# Patient Record
Sex: FEMALE | Race: WHITE | NOT HISPANIC OR LATINO
[De-identification: names, ages, dates, MRNs, and addresses within clinical notes are randomized per-mention and may not be internally consistent; named-entity substitution may affect disease eponyms.]

---

## 2017-04-18 ENCOUNTER — APPOINTMENT (OUTPATIENT)
Dept: INTERNAL MEDICINE | Facility: CLINIC | Age: 82
End: 2017-04-18

## 2017-04-18 VITALS
SYSTOLIC BLOOD PRESSURE: 100 MMHG | DIASTOLIC BLOOD PRESSURE: 66 MMHG | HEART RATE: 66 BPM | BODY MASS INDEX: 26.1 KG/M2 | TEMPERATURE: 97.5 F | OXYGEN SATURATION: 100 % | WEIGHT: 116.03 LBS | HEIGHT: 56 IN

## 2017-04-18 RX ORDER — CLINDAMYCIN HYDROCHLORIDE 300 MG/1
300 CAPSULE ORAL
Qty: 10 | Refills: 0 | Status: COMPLETED | COMMUNITY
Start: 2016-10-24

## 2017-06-28 ENCOUNTER — OTHER (OUTPATIENT)
Age: 82
End: 2017-06-28

## 2017-10-04 ENCOUNTER — FORM ENCOUNTER (OUTPATIENT)
Age: 82
End: 2017-10-04

## 2017-10-05 ENCOUNTER — APPOINTMENT (OUTPATIENT)
Dept: CT IMAGING | Facility: CLINIC | Age: 82
End: 2017-10-05

## 2017-10-05 ENCOUNTER — OUTPATIENT (OUTPATIENT)
Dept: OUTPATIENT SERVICES | Facility: HOSPITAL | Age: 82
LOS: 1 days | End: 2017-10-05
Payer: MEDICARE

## 2017-10-05 DIAGNOSIS — Z98.51 TUBAL LIGATION STATUS: Chronic | ICD-10-CM

## 2017-10-05 DIAGNOSIS — Z00.8 ENCOUNTER FOR OTHER GENERAL EXAMINATION: ICD-10-CM

## 2017-10-05 DIAGNOSIS — Z96.60 PRESENCE OF UNSPECIFIED ORTHOPEDIC JOINT IMPLANT: Chronic | ICD-10-CM

## 2017-10-05 DIAGNOSIS — Z90.710 ACQUIRED ABSENCE OF BOTH CERVIX AND UTERUS: Chronic | ICD-10-CM

## 2017-10-05 PROCEDURE — 71250 CT THORAX DX C-: CPT

## 2017-10-05 PROCEDURE — 71250 CT THORAX DX C-: CPT | Mod: 26

## 2017-10-17 ENCOUNTER — APPOINTMENT (OUTPATIENT)
Dept: INTERNAL MEDICINE | Facility: CLINIC | Age: 82
End: 2017-10-17
Payer: MEDICARE

## 2017-10-17 VITALS
DIASTOLIC BLOOD PRESSURE: 72 MMHG | TEMPERATURE: 97.7 F | HEIGHT: 56 IN | HEART RATE: 60 BPM | BODY MASS INDEX: 26.32 KG/M2 | SYSTOLIC BLOOD PRESSURE: 110 MMHG | OXYGEN SATURATION: 97 % | WEIGHT: 117 LBS

## 2017-10-17 PROCEDURE — 94010 BREATHING CAPACITY TEST: CPT | Mod: 59

## 2017-10-17 PROCEDURE — 94726 PLETHYSMOGRAPHY LUNG VOLUMES: CPT

## 2017-10-17 PROCEDURE — 99214 OFFICE O/P EST MOD 30 MIN: CPT | Mod: 25

## 2017-10-17 PROCEDURE — 94729 DIFFUSING CAPACITY: CPT

## 2017-10-17 PROCEDURE — 94620 PULMONARY STRESS TESTING SIMPLE: CPT

## 2018-03-28 ENCOUNTER — APPOINTMENT (OUTPATIENT)
Dept: NEUROLOGY | Facility: CLINIC | Age: 83
End: 2018-03-28
Payer: MEDICARE

## 2018-03-28 VITALS
SYSTOLIC BLOOD PRESSURE: 122 MMHG | BODY MASS INDEX: 24.35 KG/M2 | HEART RATE: 62 BPM | WEIGHT: 116 LBS | DIASTOLIC BLOOD PRESSURE: 60 MMHG | HEIGHT: 58 IN

## 2018-03-28 PROCEDURE — 99204 OFFICE O/P NEW MOD 45 MIN: CPT

## 2018-03-29 ENCOUNTER — FORM ENCOUNTER (OUTPATIENT)
Age: 83
End: 2018-03-29

## 2018-03-30 ENCOUNTER — APPOINTMENT (OUTPATIENT)
Dept: MRI IMAGING | Facility: CLINIC | Age: 83
End: 2018-03-30
Payer: MEDICARE

## 2018-03-30 ENCOUNTER — OUTPATIENT (OUTPATIENT)
Dept: OUTPATIENT SERVICES | Facility: HOSPITAL | Age: 83
LOS: 1 days | End: 2018-03-30
Payer: MEDICARE

## 2018-03-30 DIAGNOSIS — Z90.710 ACQUIRED ABSENCE OF BOTH CERVIX AND UTERUS: Chronic | ICD-10-CM

## 2018-03-30 DIAGNOSIS — Z00.8 ENCOUNTER FOR OTHER GENERAL EXAMINATION: ICD-10-CM

## 2018-03-30 DIAGNOSIS — Z98.51 TUBAL LIGATION STATUS: Chronic | ICD-10-CM

## 2018-03-30 DIAGNOSIS — Z96.60 PRESENCE OF UNSPECIFIED ORTHOPEDIC JOINT IMPLANT: Chronic | ICD-10-CM

## 2018-03-30 PROCEDURE — 70551 MRI BRAIN STEM W/O DYE: CPT

## 2018-03-30 PROCEDURE — 70551 MRI BRAIN STEM W/O DYE: CPT | Mod: 26

## 2018-04-03 ENCOUNTER — APPOINTMENT (OUTPATIENT)
Dept: INTERNAL MEDICINE | Facility: CLINIC | Age: 83
End: 2018-04-03
Payer: MEDICARE

## 2018-04-03 VITALS
DIASTOLIC BLOOD PRESSURE: 70 MMHG | OXYGEN SATURATION: 98 % | TEMPERATURE: 97.6 F | HEART RATE: 63 BPM | HEIGHT: 56.5 IN | WEIGHT: 118 LBS | BODY MASS INDEX: 25.81 KG/M2 | SYSTOLIC BLOOD PRESSURE: 130 MMHG

## 2018-04-03 PROCEDURE — 94010 BREATHING CAPACITY TEST: CPT | Mod: 59

## 2018-04-03 PROCEDURE — 94729 DIFFUSING CAPACITY: CPT

## 2018-04-03 PROCEDURE — 94726 PLETHYSMOGRAPHY LUNG VOLUMES: CPT

## 2018-04-03 PROCEDURE — 94618 PULMONARY STRESS TESTING: CPT

## 2018-04-03 PROCEDURE — 99214 OFFICE O/P EST MOD 30 MIN: CPT | Mod: 25

## 2018-04-27 ENCOUNTER — RX RENEWAL (OUTPATIENT)
Age: 83
End: 2018-04-27

## 2018-05-15 ENCOUNTER — APPOINTMENT (OUTPATIENT)
Dept: NEUROLOGY | Facility: CLINIC | Age: 83
End: 2018-05-15
Payer: MEDICARE

## 2018-05-15 VITALS
WEIGHT: 118 LBS | DIASTOLIC BLOOD PRESSURE: 64 MMHG | HEIGHT: 56.5 IN | HEART RATE: 56 BPM | SYSTOLIC BLOOD PRESSURE: 122 MMHG | BODY MASS INDEX: 25.81 KG/M2

## 2018-05-15 PROCEDURE — 96120: CPT | Mod: 59

## 2018-05-15 PROCEDURE — 99214 OFFICE O/P EST MOD 30 MIN: CPT | Mod: 25

## 2018-09-25 ENCOUNTER — APPOINTMENT (OUTPATIENT)
Dept: NEUROLOGY | Facility: CLINIC | Age: 83
End: 2018-09-25
Payer: MEDICARE

## 2018-09-25 VITALS
SYSTOLIC BLOOD PRESSURE: 124 MMHG | WEIGHT: 118 LBS | HEART RATE: 61 BPM | DIASTOLIC BLOOD PRESSURE: 70 MMHG | HEIGHT: 58.8 IN | BODY MASS INDEX: 24.11 KG/M2
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
## 2018-09-25 PROCEDURE — 99214 OFFICE O/P EST MOD 30 MIN: CPT

## 2018-10-02 ENCOUNTER — APPOINTMENT (OUTPATIENT)
Dept: INTERNAL MEDICINE | Facility: CLINIC | Age: 83
End: 2018-10-02
Payer: MEDICARE

## 2018-10-02 VITALS
SYSTOLIC BLOOD PRESSURE: 114 MMHG | OXYGEN SATURATION: 96 % | HEIGHT: 59 IN | TEMPERATURE: 97.2 F | HEART RATE: 65 BPM | DIASTOLIC BLOOD PRESSURE: 60 MMHG | WEIGHT: 119 LBS | BODY MASS INDEX: 23.99 KG/M2

## 2018-10-02 PROCEDURE — 94618 PULMONARY STRESS TESTING: CPT

## 2018-10-02 PROCEDURE — 94726 PLETHYSMOGRAPHY LUNG VOLUMES: CPT

## 2018-10-02 PROCEDURE — 94729 DIFFUSING CAPACITY: CPT

## 2018-10-02 PROCEDURE — 99214 OFFICE O/P EST MOD 30 MIN: CPT | Mod: 25

## 2018-10-02 PROCEDURE — 94010 BREATHING CAPACITY TEST: CPT | Mod: 59

## 2018-11-27 ENCOUNTER — RX RENEWAL (OUTPATIENT)
Age: 83
End: 2018-11-27

## 2019-01-22 ENCOUNTER — APPOINTMENT (OUTPATIENT)
Dept: NEUROLOGY | Facility: CLINIC | Age: 84
End: 2019-01-22
Payer: MEDICARE

## 2019-01-22 VITALS
BODY MASS INDEX: 24.19 KG/M2 | WEIGHT: 120 LBS | HEIGHT: 59 IN | SYSTOLIC BLOOD PRESSURE: 120 MMHG | DIASTOLIC BLOOD PRESSURE: 60 MMHG | HEART RATE: 60 BPM

## 2019-01-22 PROCEDURE — 99214 OFFICE O/P EST MOD 30 MIN: CPT

## 2019-01-22 NOTE — REASON FOR VISIT
[Follow-Up: _____] : a [unfilled] follow-up visit [Spouse] : spouse [FreeTextEntry1] : follow-up to cognitive decline

## 2019-01-22 NOTE — DATA REVIEWED
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

## 2019-01-22 NOTE — PHYSICAL EXAM
[General Appearance - Alert] : alert [General Appearance - In No Acute Distress] : in no acute distress [Oriented To Time, Place, And Person] : oriented to person, place, and time [Impaired Insight] : insight and judgment were intact [Affect] : the affect was normal [Person] : oriented to person [Place] : oriented to place [Time] : oriented to time [Concentration Intact] : normal concentrating ability [Visual Intact] : visual attention was ~T not ~L decreased [Naming Objects] : no difficulty naming common objects [Repeating Phrases] : no difficulty repeating a phrase [Writing A Sentence] : no difficulty writing a sentence [Fluency] : fluency intact [Comprehension] : comprehension intact [Reading] : reading intact [Past History] : adequate knowledge of personal past history [Cranial Nerves Optic (II)] : visual acuity intact bilaterally,  visual fields full to confrontation, pupils equal round and reactive to light [Cranial Nerves Oculomotor (III)] : extraocular motion intact [Cranial Nerves Trigeminal (V)] : facial sensation intact symmetrically [Cranial Nerves Facial (VII)] : face symmetrical [Cranial Nerves Vestibulocochlear (VIII)] : hearing was intact bilaterally [Cranial Nerves Glossopharyngeal (IX)] : tongue and palate midline [Cranial Nerves Accessory (XI - Cranial And Spinal)] : head turning and shoulder shrug symmetric [Cranial Nerves Hypoglossal (XII)] : there was no tongue deviation with protrusion [Motor Tone] : muscle tone was normal in all four extremities [Motor Strength] : muscle strength was normal in all four extremities [No Muscle Atrophy] : normal bulk in all four extremities [Sensation Tactile Decrease] : light touch was intact [Proprioception] : proprioception was intact [Vibration Decrease Leg / Foot Both Ankles] : decreased at both ankles [Vibration Decrease Leg / Foot Toes Both Feet] : decreased at the toes of both feet  [Balance] : balance was intact [2+] : Ankle jerk left 2+ [PERRL With Normal Accommodation] : pupils were equal in size, round, reactive to light, with normal accommodation [Extraocular Movements] : extraocular movements were intact [Full Visual Field] : full visual field [Hearing Threshold Finger Rub Not [place]] : hearing was normal [Neck Cervical Mass (___cm)] : no neck mass was observed [Auscultation Breath Sounds / Voice Sounds] : lungs were clear to auscultation bilaterally [Apical Impulse] : the apical impulse was normal [Heart Sounds] : normal S1 and S2 [Arterial Pulses Carotid] : carotid pulses were normal with no bruits [Edema] : there was no peripheral edema [No Spinal Tenderness] : no spinal tenderness [Abnormal Walk] : normal gait [Involuntary Movements] : no involuntary movements were seen [] : no rash [FreeTextEntry1] : facial hematoma [Romberg's Sign] : Romberg's sign was negtive [Past-pointing] : there was no past-pointing [Tremor] : no tremor present [Plantar Reflex Right Only] : normal on the right [Plantar Reflex Left Only] : normal on the left [FreeTextEntry5] : right periorbital and facial hematoma

## 2019-01-22 NOTE — HISTORY OF PRESENT ILLNESS
[FreeTextEntry1] : Patient is here for a follow up visit today, was last seen on 9/25/18. Patient is on donepezil 10 mg daily, is taking it, has not noted any adverse effects, he has not noted any change in  cognition or functioning status. Denies cramps, bleeding, bad dreams,  has note noted any change or decline, is functional, denies HA, dizziness\par \par \par Pt has not had any falls, LOC. She had followed up with PMD / hematologist, has mildly low platelet count. \par \par

## 2019-01-22 NOTE — DISCUSSION/SUMMARY
[FreeTextEntry1] : 83-year-old RH female with PMH remarkable for ILD, MDS, arthritis/osteoporosis, presents today with mildly evolving cognitive/memory problems since past year.  MoCA score 22/30\par \par Cognitive assessment reveals Global cognitive score 93.2, More than one SD below average in Memory   verbal function, below average in attention, executive function and  information processing speed.\par \par # Mild dementia\par \par - continue donepezil 10 mg daily, adverse effects were discussed with the patient.\par - perform cognitive exercises, state engaged in social activities, follow up with me in 6 months\par \par

## 2019-01-22 NOTE — REVIEW OF SYSTEMS
[As Noted in HPI] : as noted in HPI [Memory Lapses or Loss] : memory loss [Eyesight Problems] : eyesight problems [Negative] : Heme/Lymph

## 2019-04-09 ENCOUNTER — APPOINTMENT (OUTPATIENT)
Dept: INTERNAL MEDICINE | Facility: CLINIC | Age: 84
End: 2019-04-09
Payer: MEDICARE

## 2019-04-09 VITALS
WEIGHT: 120 LBS | TEMPERATURE: 97.6 F | HEART RATE: 58 BPM | DIASTOLIC BLOOD PRESSURE: 70 MMHG | HEIGHT: 59 IN | SYSTOLIC BLOOD PRESSURE: 134 MMHG | BODY MASS INDEX: 24.19 KG/M2 | OXYGEN SATURATION: 93 %

## 2019-04-09 PROCEDURE — 94726 PLETHYSMOGRAPHY LUNG VOLUMES: CPT

## 2019-04-09 PROCEDURE — 99214 OFFICE O/P EST MOD 30 MIN: CPT | Mod: 25

## 2019-04-09 PROCEDURE — 94010 BREATHING CAPACITY TEST: CPT

## 2019-04-09 PROCEDURE — 94618 PULMONARY STRESS TESTING: CPT

## 2019-04-09 PROCEDURE — 94729 DIFFUSING CAPACITY: CPT

## 2019-04-09 NOTE — PHYSICAL EXAM
[General Appearance - Well Developed] : well developed [Well Groomed] : well groomed [General Appearance - Well Nourished] : well nourished [General Appearance - In No Acute Distress] : no acute distress [Jugular Venous Distention Increased] : there was no jugular-venous distention [Heart Sounds] : normal S1 and S2 [Edema] : no peripheral edema present [Heart Rate And Rhythm] : heart rate and rhythm were normal [Exaggerated Use Of Accessory Muscles For Inspiration] : no accessory muscle use [] : no respiratory distress [Respiration, Rhythm And Depth] : normal respiratory rhythm and effort [Bowel Sounds] : normal bowel sounds [Abdomen Soft] : soft [Abnormal Walk] : normal gait [Oriented To Time, Place, And Person] : oriented to person, place, and time [Skin Color & Pigmentation] : normal skin color and pigmentation [Affect] : the affect was normal [Cyanosis, Localized] : no localized cyanosis [Nail Clubbing] : no clubbing of the fingernails [No Oral Cyanosis] : no oral cyanosis [FreeTextEntry1] : R=16; no wheezing; good air entry; chronic b/l mostly basilar rales especially on right

## 2019-04-09 NOTE — ASSESSMENT
[FreeTextEntry1] : ILD/HSP\par Clinically stable\par PFT's remain stable as well.Still desaturates on 6 minute walk test.\par \par Will monitor CT chest - will do f/u in 2019\par PFT's and 6 minute walk with next visit\par Gets flu vaccine annually - received 2018\par No longer smokes\par [place]\par Continue oxygen with exercise and with sleep at 2 LPM via nasal cannula\par To call for any pulmonary issues\par Wishes to RTC in 6 months and as needed

## 2019-04-09 NOTE — HISTORY OF PRESENT ILLNESS
[Stable] : are stable [Difficulty Breathing During Exertion] : stable dyspnea on exertion [Feelings Of Weakness On Exertion] : stable exercise intolerance [Cough] : denies coughing [Wheezing] : denies wheezing [Regional Soft Tissue Swelling Both Lower Extremities] : denies lower extremity edema [Chest Pain Or Discomfort] : denies chest pain [Fever] : denies fever [Oxygen] : the patient uses supplemental oxygen [Oxygen Rate  ___ lpm] : Oxygen rate is [unfilled] lpm [NC] : Nasal Cannula [Nightly] : nightly [Class II - Mild Symptoms and Slight Limitations] : II [2  -  Slight] : 2, slight [Date: ___] : was performed [unfilled] [Does not check] : The patient is not checking peak flow at home [URI] : upper respiratory tract infection [None] : The patient is not currently on any medications [Exercise] : exercise [Goals--Doing Well] : the patient is doing well with ~his/her~ goals [Wt Gain ___ Lbs] : no recent weight gain [Wt Loss ___ Lbs] : no recent weight loss [More Frequent Use Needed Recently] : Patient reports no recent increase in frequency of [de-identified] : denies hemoptysis [FreeTextEntry1] : Comes in for routine pulmonary f/u for ILD.\par Respiratory status unchanged with no new complaints.\par See HPI above.

## 2019-05-26 ENCOUNTER — RX RENEWAL (OUTPATIENT)
Age: 84
End: 2019-05-26

## 2019-07-12 ENCOUNTER — APPOINTMENT (OUTPATIENT)
Dept: NEUROLOGY | Facility: CLINIC | Age: 84
End: 2019-07-12
Payer: MEDICARE

## 2019-07-12 VITALS
HEIGHT: 59 IN | BODY MASS INDEX: 23.79 KG/M2 | HEART RATE: 60 BPM | DIASTOLIC BLOOD PRESSURE: 60 MMHG | SYSTOLIC BLOOD PRESSURE: 122 MMHG | WEIGHT: 118 LBS

## 2019-07-12 PROCEDURE — 99214 OFFICE O/P EST MOD 30 MIN: CPT

## 2019-07-12 NOTE — HISTORY OF PRESENT ILLNESS
[FreeTextEntry1] : Patient is here for a follow up visit today, was last seen on 1/22/19. Patient is on donepezil 10 mg daily, is taking it, has not noted any adverse effects, he has not noted any change in  cognition or functioning status. As per  is managing her ADL's-affairs, took care of him recently when he had surgery.\par \par Denies cramps, bleeding, bad dreams,  has note noted any change or decline, is functional, denies HA, dizziness\par \par \par Pt has not had any falls, LOC. She had followed up with PMD / hematologist, has mildly low platelet count. \par \par

## 2019-07-12 NOTE — DISCUSSION/SUMMARY
[FreeTextEntry1] : 84-year-old RH female with PMH remarkable for ILD, MDS, arthritis/osteoporosis, presents today with mildly evolving cognitive/memory problems since past year.  MoCA score 22/30\par \par Cognitive assessment reveals Global cognitive score 93.2, More than one SD below average in Memory   verbal function, below average in attention, executive function and  information processing speed.\par \par # Mild dementia\par \par - continue donepezil 10 mg daily, adverse effects were discussed with the patient.\par - perform cognitive exercises, state engaged in social activities.\par - Repeat Cog test in 4 months, if drop, add Namenda

## 2019-07-12 NOTE — DATA REVIEWED
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

## 2019-07-12 NOTE — PHYSICAL EXAM
[General Appearance - Alert] : alert [General Appearance - In No Acute Distress] : in no acute distress [Impaired Insight] : insight and judgment were intact [Oriented To Time, Place, And Person] : oriented to person, place, and time [Affect] : the affect was normal [Person] : oriented to person [Place] : oriented to place [Concentration Intact] : normal concentrating ability [Time] : oriented to time [Visual Intact] : visual attention was ~T not ~L decreased [Naming Objects] : no difficulty naming common objects [Repeating Phrases] : no difficulty repeating a phrase [Writing A Sentence] : no difficulty writing a sentence [Fluency] : fluency intact [Comprehension] : comprehension intact [Reading] : reading intact [Past History] : adequate knowledge of personal past history [Cranial Nerves Optic (II)] : visual acuity intact bilaterally,  visual fields full to confrontation, pupils equal round and reactive to light [Cranial Nerves Facial (VII)] : face symmetrical [Cranial Nerves Trigeminal (V)] : facial sensation intact symmetrically [Cranial Nerves Oculomotor (III)] : extraocular motion intact [Cranial Nerves Vestibulocochlear (VIII)] : hearing was intact bilaterally [Cranial Nerves Accessory (XI - Cranial And Spinal)] : head turning and shoulder shrug symmetric [Cranial Nerves Glossopharyngeal (IX)] : tongue and palate midline [Motor Tone] : muscle tone was normal in all four extremities [Cranial Nerves Hypoglossal (XII)] : there was no tongue deviation with protrusion [Motor Strength] : muscle strength was normal in all four extremities [No Muscle Atrophy] : normal bulk in all four extremities [Sensation Tactile Decrease] : light touch was intact [Proprioception] : proprioception was intact [Vibration Decrease Leg / Foot Both Ankles] : decreased at both ankles [Vibration Decrease Leg / Foot Toes Both Feet] : decreased at the toes of both feet  [Balance] : balance was intact [2+] : Ankle jerk left 2+ [PERRL With Normal Accommodation] : pupils were equal in size, round, reactive to light, with normal accommodation [Extraocular Movements] : extraocular movements were intact [Full Visual Field] : full visual field [Hearing Threshold Finger Rub Not [place]] : hearing was normal [Neck Cervical Mass (___cm)] : no neck mass was observed [Auscultation Breath Sounds / Voice Sounds] : lungs were clear to auscultation bilaterally [Apical Impulse] : the apical impulse was normal [Heart Sounds] : normal S1 and S2 [Arterial Pulses Carotid] : carotid pulses were normal with no bruits [No Spinal Tenderness] : no spinal tenderness [Edema] : there was no peripheral edema [Abnormal Walk] : normal gait [] : no rash [Involuntary Movements] : no involuntary movements were seen [FreeTextEntry1] : facial hematoma [Romberg's Sign] : Romberg's sign was negtive [Past-pointing] : there was no past-pointing [Tremor] : no tremor present [Plantar Reflex Left Only] : normal on the left [Plantar Reflex Right Only] : normal on the right [FreeTextEntry5] : right periorbital and facial hematoma

## 2019-10-08 ENCOUNTER — APPOINTMENT (OUTPATIENT)
Dept: INTERNAL MEDICINE | Facility: CLINIC | Age: 84
End: 2019-10-08
Payer: MEDICARE

## 2019-10-08 VITALS
DIASTOLIC BLOOD PRESSURE: 60 MMHG | SYSTOLIC BLOOD PRESSURE: 130 MMHG | WEIGHT: 110 LBS | TEMPERATURE: 97.3 F | OXYGEN SATURATION: 92 % | HEART RATE: 84 BPM | BODY MASS INDEX: 25.46 KG/M2 | HEIGHT: 55 IN

## 2019-10-08 PROCEDURE — 94726 PLETHYSMOGRAPHY LUNG VOLUMES: CPT

## 2019-10-08 PROCEDURE — 94729 DIFFUSING CAPACITY: CPT

## 2019-10-08 PROCEDURE — 94010 BREATHING CAPACITY TEST: CPT

## 2019-10-08 PROCEDURE — 99214 OFFICE O/P EST MOD 30 MIN: CPT | Mod: 25

## 2019-10-08 PROCEDURE — 94618 PULMONARY STRESS TESTING: CPT

## 2019-10-08 NOTE — ASSESSMENT
[FreeTextEntry1] : ILD/HSP\par Clinically stable\par PFT's remain stable as well.No desaturation on 6 minute walk test.\par \par Will monitor CT chest - wishes to do f/u in 2020\par PFT's and 6 minute walk with next visit\par Gets flu vaccine annually - received 2018 - will get 2019 from PCP\par No longer smokes\par [place]\par Continue oxygen with exercise and with sleep at 2 LPM via nasal cannula\par To call for any pulmonary issues\par Wishes to RTC in 6 months and as needed

## 2019-10-08 NOTE — HISTORY OF PRESENT ILLNESS
[Stable] : are stable [Difficulty Breathing During Exertion] : stable dyspnea on exertion [Feelings Of Weakness On Exertion] : stable exercise intolerance [Cough] : denies coughing [Wheezing] : denies wheezing [Regional Soft Tissue Swelling Both Lower Extremities] : denies lower extremity edema [Chest Pain Or Discomfort] : denies chest pain [Fever] : denies fever [Oxygen] : the patient uses supplemental oxygen [Oxygen Rate  ___ lpm] : Oxygen rate is [unfilled] lpm [2  -  Slight] : 2, slight [Nightly] : nightly [NC] : Nasal Cannula [Class II - Mild Symptoms and Slight Limitations] : II [Does not check] : The patient is not checking peak flow at home [URI] : upper respiratory tract infection [Exercise] : exercise [None] : The patient is not currently on any medications [Goals--Doing Well] : the patient is doing well with ~his/her~ goals [Wt Loss ___ Lbs] : recent [unfilled] ~Upound(s) weight loss [Date: ___] : was performed [unfilled] [Flu Vaccine] : influenza virus vaccine [Wt Gain ___ Lbs] : no recent weight gain [More Frequent Use Needed Recently] : Patient reports no recent increase in frequency of [de-identified] : denies hemoptysis [FreeTextEntry1] : Comes in for routine pulmonary f/u for ILD.\par Respiratory status unchanged with no new complaints.\par See HPI above.

## 2019-10-08 NOTE — PHYSICAL EXAM
[General Appearance - Well Developed] : well developed [Well Groomed] : well groomed [General Appearance - Well Nourished] : well nourished [General Appearance - In No Acute Distress] : no acute distress [Jugular Venous Distention Increased] : there was no jugular-venous distention [Heart Rate And Rhythm] : heart rate and rhythm were normal [Heart Sounds] : normal S1 and S2 [Edema] : no peripheral edema present [] : no respiratory distress [Respiration, Rhythm And Depth] : normal respiratory rhythm and effort [Exaggerated Use Of Accessory Muscles For Inspiration] : no accessory muscle use [Bowel Sounds] : normal bowel sounds [Abdomen Soft] : soft [Abnormal Walk] : normal gait [Skin Color & Pigmentation] : normal skin color and pigmentation [Oriented To Time, Place, And Person] : oriented to person, place, and time [Affect] : the affect was normal [No Oral Cyanosis] : no oral cyanosis [Nail Clubbing] : no clubbing of the fingernails [Cyanosis, Localized] : no localized cyanosis [FreeTextEntry1] : \rod In 1991, 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 mass lesion. Underwent VATS with [place] lung [place] by Dr. Herman at [facility] [place]. 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

## 2019-12-02 ENCOUNTER — RX RENEWAL (OUTPATIENT)
Age: 84
End: 2019-12-02

## 2019-12-05 ENCOUNTER — RX RENEWAL (OUTPATIENT)
Age: 84
End: 2019-12-05

## 2019-12-10 ENCOUNTER — APPOINTMENT (OUTPATIENT)
Dept: NEUROLOGY | Facility: CLINIC | Age: 84
End: 2019-12-10
Payer: MEDICARE

## 2019-12-10 VITALS
BODY MASS INDEX: 24.07 KG/M2 | HEIGHT: 55 IN | HEART RATE: 63 BPM | WEIGHT: 104 LBS | SYSTOLIC BLOOD PRESSURE: 122 MMHG | DIASTOLIC BLOOD PRESSURE: 74 MMHG

## 2019-12-10 DIAGNOSIS — Z63.4 DISAPPEARANCE AND DEATH OF FAMILY MEMBER: ICD-10-CM

## 2019-12-10 PROCEDURE — 99214 OFFICE O/P EST MOD 30 MIN: CPT

## 2019-12-10 SDOH — SOCIAL STABILITY - SOCIAL INSECURITY: DISSAPEARANCE AND DEATH OF FAMILY MEMBER: Z63.4

## 2019-12-10 NOTE — DISCUSSION/SUMMARY
[FreeTextEntry1] : 84-year-old RH female with PMH remarkable for ILD, MDS, arthritis/osteoporosis, presents today with mildly evolving cognitive/memory problems.  MoCA score 22/30. Cognitive assessment reveals Global cognitive score 93.2, More than one SD below average in Memory   verbal function, below average in attention, executive function and  information processing speed.\par \par # Mild dementia, mildly progressive\par \par - continue donepezil 10 mg daily, adverse effects were discussed with the patient.\par - perform cognitive exercises, state engaged in social activities.\par - Repeat Cog test in 4 months, may add Namenda if clinically indicated

## 2019-12-10 NOTE — PHYSICAL EXAM
[General Appearance - In No Acute Distress] : in no acute distress [General Appearance - Alert] : alert [Affect] : the affect was normal [Oriented To Time, Place, And Person] : oriented to person, place, and time [Impaired Insight] : insight and judgment were intact [Place] : oriented to place [Person] : oriented to person [Time] : oriented to time [Concentration Intact] : normal concentrating ability [Naming Objects] : no difficulty naming common objects [Visual Intact] : visual attention was ~T not ~L decreased [Writing A Sentence] : no difficulty writing a sentence [Fluency] : fluency intact [Repeating Phrases] : no difficulty repeating a phrase [Reading] : reading intact [Past History] : adequate knowledge of personal past history [Comprehension] : comprehension intact [Cranial Nerves Oculomotor (III)] : extraocular motion intact [Cranial Nerves Optic (II)] : visual acuity intact bilaterally,  visual fields full to confrontation, pupils equal round and reactive to light [Cranial Nerves Trigeminal (V)] : facial sensation intact symmetrically [Cranial Nerves Facial (VII)] : face symmetrical [Cranial Nerves Vestibulocochlear (VIII)] : hearing was intact bilaterally [Cranial Nerves Glossopharyngeal (IX)] : tongue and palate midline [Cranial Nerves Accessory (XI - Cranial And Spinal)] : head turning and shoulder shrug symmetric [Cranial Nerves Hypoglossal (XII)] : there was no tongue deviation with protrusion [Motor Tone] : muscle tone was normal in all four extremities [Motor Strength] : muscle strength was normal in all four extremities [Proprioception] : proprioception was intact [No Muscle Atrophy] : normal bulk in all four extremities [Sensation Tactile Decrease] : light touch was intact [Vibration Decrease Leg / Foot Both Ankles] : decreased at both ankles [Vibration Decrease Leg / Foot Toes Both Feet] : decreased at the toes of both feet  [Abnormal Walk] : normal gait [Balance] : balance was intact [2+] : Patella right 2+ [PERRL With Normal Accommodation] : pupils were equal in size, round, reactive to light, with normal accommodation [Extraocular Movements] : extraocular movements were intact [Full Visual Field] : full visual field [Hearing Threshold Finger Rub Not [place]] : hearing was normal [Apical Impulse] : the apical impulse was normal [Auscultation Breath Sounds / Voice Sounds] : lungs were clear to auscultation bilaterally [Neck Cervical Mass (___cm)] : no neck mass was observed [Arterial Pulses Carotid] : carotid pulses were normal with no bruits [Heart Sounds] : normal S1 and S2 [Edema] : there was no peripheral edema [1+] : Ankle jerk left 1+ [] : no respiratory distress [Romberg's Sign] : Romberg's sign was negtive [Past-pointing] : there was no past-pointing [Tremor] : no tremor present [Plantar Reflex Right Only] : normal on the right [Plantar Reflex Left Only] : normal on the left

## 2019-12-10 NOTE — REASON FOR VISIT
[Spouse] : spouse [Follow-Up: _____] : a [unfilled] follow-up visit [FreeTextEntry1] : follow-up to cognitive decline

## 2019-12-10 NOTE — HISTORY OF PRESENT ILLNESS
[FreeTextEntry1] : Patient is here for a follow up visit today, was last seen on 7/12/19. Patient is on donepezil 10 mg daily, is taking it, has not noted any adverse effects, he has not noted any change in  cognition or functioning status. \par \par Pt reports her  passed away in 8/19, she is managing her life by herself, her stepdaughter is the nearest relative who helps her.\par \par Denies cramps, bleeding, bad dreams,  has note noted any change or decline, is functional, denies HA, dizziness.\par \par \par Pt has not had any falls, LOC.  \par \par

## 2019-12-10 NOTE — DATA REVIEWED
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

## 2020-03-09 ENCOUNTER — APPOINTMENT (OUTPATIENT)
Dept: NEUROLOGY | Facility: CLINIC | Age: 85
End: 2020-03-09
Payer: MEDICARE

## 2020-03-09 VITALS
SYSTOLIC BLOOD PRESSURE: 130 MMHG | DIASTOLIC BLOOD PRESSURE: 80 MMHG | TEMPERATURE: 97.7 F | BODY MASS INDEX: 21.98 KG/M2 | HEIGHT: 55 IN | WEIGHT: 95 LBS | HEART RATE: 70 BPM

## 2020-03-09 PROCEDURE — 99214 OFFICE O/P EST MOD 30 MIN: CPT | Mod: 25

## 2020-03-09 PROCEDURE — 96132 NRPSYC TST EVAL PHYS/QHP 1ST: CPT | Mod: 59

## 2020-03-09 NOTE — PROCEDURE
[FreeTextEntry1] : Cognitive assessment:  Global battery\par \par Global cognitive score: 83.7\par \par More than one standard deviation below average: Memory 75, motor skills 83.8 verbal function \par \par Below average in domains:  attention 87.3, executive function 85.6,  information processing speed 86.6, \par \par Above average in domains: None\par \par Not scored: verbal function, visuospatial function

## 2020-03-09 NOTE — DATA REVIEWED
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

## 2020-03-09 NOTE — PHYSICAL EXAM
[General Appearance - Alert] : alert [General Appearance - In No Acute Distress] : in no acute distress [Oriented To Time, Place, And Person] : oriented to person, place, and time [Impaired Insight] : insight and judgment were intact [Affect] : the affect was normal [Person] : oriented to person [Place] : oriented to place [Time] : oriented to time [Concentration Intact] : normal concentrating ability [Visual Intact] : visual attention was ~T not ~L decreased [Naming Objects] : no difficulty naming common objects [Repeating Phrases] : no difficulty repeating a phrase [Writing A Sentence] : no difficulty writing a sentence [Fluency] : fluency intact [Comprehension] : comprehension intact [Reading] : reading intact [Past History] : adequate knowledge of personal past history [Cranial Nerves Optic (II)] : visual acuity intact bilaterally,  visual fields full to confrontation, pupils equal round and reactive to light [Cranial Nerves Oculomotor (III)] : extraocular motion intact [Cranial Nerves Trigeminal (V)] : facial sensation intact symmetrically [Cranial Nerves Facial (VII)] : face symmetrical [Cranial Nerves Vestibulocochlear (VIII)] : hearing was intact bilaterally [Cranial Nerves Glossopharyngeal (IX)] : tongue and palate midline [Cranial Nerves Accessory (XI - Cranial And Spinal)] : head turning and shoulder shrug symmetric [Cranial Nerves Hypoglossal (XII)] : there was no tongue deviation with protrusion [Motor Tone] : muscle tone was normal in all four extremities [Motor Strength] : muscle strength was normal in all four extremities [No Muscle Atrophy] : normal bulk in all four extremities [Sensation Tactile Decrease] : light touch was intact [Proprioception] : proprioception was intact [Vibration Decrease Leg / Foot Both Ankles] : decreased at both ankles [Vibration Decrease Leg / Foot Toes Both Feet] : decreased at the toes of both feet  [Abnormal Walk] : normal gait [Balance] : balance was intact [2+] : Patella left 2+ [1+] : Ankle jerk left 1+ [Extraocular Movements] : extraocular movements were intact [Edema] : there was no peripheral edema [Romberg's Sign] : Romberg's sign was negtive [Past-pointing] : there was no past-pointing [Tremor] : no tremor present [Plantar Reflex Right Only] : normal on the right [Plantar Reflex Left Only] : normal on the left

## 2020-03-09 NOTE — REASON FOR VISIT
[Spouse] : spouse [Procedure: _________] : a [unfilled] procedure visit [FreeTextEntry1] : follow-up cognitive testing

## 2020-03-09 NOTE — HISTORY OF PRESENT ILLNESS
[FreeTextEntry1] : Patient is here for a follow up visit today, was last seen on 12/10/19. Patient is on donepezil 10 mg daily,  he has not noted any change in  cognition or functioning status. Pt is managing her life after her  passed away in 8/19, her stepdaughter is the nearest relative who helps her.\par \par \par The patient is here for cognitive assessment today\par \par \par \par

## 2020-03-09 NOTE — DISCUSSION/SUMMARY
[FreeTextEntry1] : 84-year-old RH F with PMHx of ILD, MDS, OA/OP, with mildly evolving cognitive/memory problems. MoCA score 22/30. Cognitive test ~ Global cognitive score 93.2, more than one SD below average in Memory, verbal func; below average in 3 domains.\par \par # Dementia, senile, mildly progressive; repeat cognitive test reveals Global score 83.7; significant drop in score ~ 10 points compared with previous test of 5/15/18\par \par - continue donepezil 10 mg daily.\par - Will add Memantine ER 7 mg daily for 3-4 months, if tolerated well and no adverse effects increased dose to 14 mg at follow-up\par - perform cognitive exercises, state engaged in social activities.\par

## 2020-06-04 ENCOUNTER — RX RENEWAL (OUTPATIENT)
Age: 85
End: 2020-06-04

## 2020-07-23 DIAGNOSIS — Z11.59 ENCOUNTER FOR SCREENING FOR OTHER VIRAL DISEASES: ICD-10-CM

## 2020-07-29 ENCOUNTER — TRANSCRIPTION ENCOUNTER (OUTPATIENT)
Age: 85
End: 2020-07-29

## 2020-08-03 ENCOUNTER — APPOINTMENT (OUTPATIENT)
Dept: DISASTER EMERGENCY | Facility: CLINIC | Age: 85
End: 2020-08-03

## 2020-08-03 DIAGNOSIS — Z01.818 ENCOUNTER FOR OTHER PREPROCEDURAL EXAMINATION: ICD-10-CM

## 2020-08-06 ENCOUNTER — APPOINTMENT (OUTPATIENT)
Dept: INTERNAL MEDICINE | Facility: CLINIC | Age: 85
End: 2020-08-06

## 2020-09-04 ENCOUNTER — RX RENEWAL (OUTPATIENT)
Age: 85
End: 2020-09-04

## 2020-09-11 ENCOUNTER — APPOINTMENT (OUTPATIENT)
Dept: NEUROLOGY | Facility: CLINIC | Age: 85
End: 2020-09-11
Payer: MEDICARE

## 2020-09-11 VITALS
TEMPERATURE: 98 F | DIASTOLIC BLOOD PRESSURE: 83 MMHG | HEART RATE: 61 BPM | HEIGHT: 55 IN | WEIGHT: 95 LBS | BODY MASS INDEX: 21.98 KG/M2 | SYSTOLIC BLOOD PRESSURE: 132 MMHG

## 2020-09-11 DIAGNOSIS — F03.90 UNSPECIFIED DEMENTIA W/OUT BEHAVIORAL DISTURBANCE: ICD-10-CM

## 2020-09-11 PROCEDURE — 99214 OFFICE O/P EST MOD 30 MIN: CPT

## 2020-09-11 NOTE — PHYSICAL EXAM
[General Appearance - Alert] : alert [Oriented To Time, Place, And Person] : oriented to person, place, and time [General Appearance - In No Acute Distress] : in no acute distress [Impaired Insight] : insight and judgment were intact [Person] : oriented to person [Place] : oriented to place [Affect] : the affect was normal [Concentration Intact] : normal concentrating ability [Visual Intact] : visual attention was ~T not ~L decreased [Naming Objects] : no difficulty naming common objects [Writing A Sentence] : no difficulty writing a sentence [Repeating Phrases] : no difficulty repeating a phrase [Fluency] : fluency intact [Reading] : reading intact [Comprehension] : comprehension intact [Past History] : adequate knowledge of personal past history [Cranial Nerves Optic (II)] : visual acuity intact bilaterally,  visual fields full to confrontation, pupils equal round and reactive to light [Cranial Nerves Oculomotor (III)] : extraocular motion intact [Cranial Nerves Trigeminal (V)] : facial sensation intact symmetrically [Cranial Nerves Facial (VII)] : face symmetrical [Cranial Nerves Vestibulocochlear (VIII)] : hearing was intact bilaterally [Cranial Nerves Glossopharyngeal (IX)] : tongue and palate midline [Cranial Nerves Accessory (XI - Cranial And Spinal)] : head turning and shoulder shrug symmetric [Motor Tone] : muscle tone was normal in all four extremities [Cranial Nerves Hypoglossal (XII)] : there was no tongue deviation with protrusion [No Muscle Atrophy] : normal bulk in all four extremities [Motor Strength] : muscle strength was normal in all four extremities [Proprioception] : proprioception was intact [Sensation Tactile Decrease] : light touch was intact [Vibration Decrease Leg / Foot Both Ankles] : decreased at both ankles [Abnormal Walk] : normal gait [Vibration Decrease Leg / Foot Toes Both Feet] : decreased at the toes of both feet  [Balance] : balance was intact [2+] : Patella left 2+ [1+] : Ankle jerk left 1+ [Extraocular Movements] : extraocular movements were intact [Edema] : there was no peripheral edema [Full Visual Field] : full visual field [Romberg's Sign] : Romberg's sign was negtive [Time] : disoriented to time [Tremor] : no tremor present [Past-pointing] : there was no past-pointing [Plantar Reflex Right Only] : normal on the right [Plantar Reflex Left Only] : normal on the left

## 2020-09-11 NOTE — DISCUSSION/SUMMARY
[FreeTextEntry1] : 85-year-old RH F with PMHx of ILD, MDS, OA/OP, with mildly evolving cognitive/memory problems.  Cognitive test ~ Global score 93.2, more than one SD below average in Memory, verbal func; below average in 3 domains.\par \par # Dementia, senile, mildly progressive; repeat cognitive test in 3/30555 reveals Global score 83.7; significant drop in score ~ 10 points compared with previous test of 5/15/18\par \par - continue donepezil 10 mg daily.\par - Increase Memantine ER 14 mg daily \par - Advised to perform cognitive exercises, stay engaged in social activities, advised regarding safety as she lives alone.\par - Pt advised not to drive\par - Above discussed with Kamille laguerre' step-daughter

## 2020-09-11 NOTE — HISTORY OF PRESENT ILLNESS
[FreeTextEntry1] : Patient is here for follow with her today, was last seen on 3/9/20, Is accompanied by her stepdaughter Kamille.Pt reports she is leading her life normally, lives alone, once a week she has cleaning lady come in who helps her, she goes to the [employer] herself, has not had any issues.\par \par Patient had cognitive testing at the last visit, Global cognitive score 83.7, one SD below average noted in memory, motor skills and verbal function, significant drop from prior test of 2018. In Addition to donepezil, memantine ER 7 mg was added, patient reports she has been taking the medicine regularly, has not had any adverse effects,  has not noted any significant improvement or decline in her cognitive functions.

## 2020-09-11 NOTE — DATA REVIEWED
[de-identified] : 3/9/20: Cognitive assessment\par Global cognitive score: 83.7\par More than one standard deviation below average: Memory 75, motor skills 83.8 verbal function \par Below average in domains:  attention 87.3, executive function 85.6,  information processing speed 86.6, \par Above average in domains: None\par 5/15/18: Cognitive assessment: Global battery\par Global cognitive score: 93.2\par More than one standard deviation below average: Memory 81.6,  verbal function 72.9\par Below average in domains:  attention 91.8, executive function 91,  information processing speed 96.2, \par Above average in domains:visuospatial function 109.8,  motor skills 108.8 [de-identified] : 2/1/18; B12, TSH normal\par Rest of labs reviewed 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\par \par \par

## 2020-09-17 ENCOUNTER — APPOINTMENT (OUTPATIENT)
Dept: DISASTER EMERGENCY | Facility: CLINIC | Age: 85
End: 2020-09-17

## 2020-09-17 DIAGNOSIS — Z01.818 ENCOUNTER FOR OTHER PREPROCEDURAL EXAMINATION: ICD-10-CM

## 2020-10-10 ENCOUNTER — TRANSCRIPTION ENCOUNTER (OUTPATIENT)
Age: 85
End: 2020-10-10

## 2020-10-15 ENCOUNTER — APPOINTMENT (OUTPATIENT)
Dept: INTERNAL MEDICINE | Facility: CLINIC | Age: 85
End: 2020-10-15

## 2020-12-03 DIAGNOSIS — Z01.812 ENCOUNTER FOR PREPROCEDURAL LABORATORY EXAMINATION: ICD-10-CM

## 2020-12-05 ENCOUNTER — TRANSCRIPTION ENCOUNTER (OUTPATIENT)
Age: 85
End: 2020-12-05

## 2020-12-07 ENCOUNTER — EMERGENCY (EMERGENCY)
Facility: HOSPITAL | Age: 85
LOS: 1 days | Discharge: HOME | End: 2020-12-07
Attending: EMERGENCY MEDICINE | Admitting: EMERGENCY MEDICINE
Payer: MEDICARE

## 2020-12-07 VITALS
TEMPERATURE: 97 F | OXYGEN SATURATION: 98 % | DIASTOLIC BLOOD PRESSURE: 55 MMHG | SYSTOLIC BLOOD PRESSURE: 130 MMHG | HEART RATE: 57 BPM | RESPIRATION RATE: 18 BRPM | HEIGHT: 58 IN | WEIGHT: 85.1 LBS

## 2020-12-07 VITALS
HEART RATE: 69 BPM | TEMPERATURE: 98 F | RESPIRATION RATE: 18 BRPM | DIASTOLIC BLOOD PRESSURE: 51 MMHG | SYSTOLIC BLOOD PRESSURE: 113 MMHG | OXYGEN SATURATION: 98 %

## 2020-12-07 DIAGNOSIS — Z96.60 PRESENCE OF UNSPECIFIED ORTHOPEDIC JOINT IMPLANT: Chronic | ICD-10-CM

## 2020-12-07 DIAGNOSIS — Z90.710 ACQUIRED ABSENCE OF BOTH CERVIX AND UTERUS: Chronic | ICD-10-CM

## 2020-12-07 DIAGNOSIS — Z98.51 TUBAL LIGATION STATUS: Chronic | ICD-10-CM

## 2020-12-07 PROCEDURE — 93931 UPPER EXTREMITY STUDY: CPT

## 2020-12-07 PROCEDURE — 73030 X-RAY EXAM OF SHOULDER: CPT

## 2020-12-07 PROCEDURE — 73020 X-RAY EXAM OF SHOULDER: CPT | Mod: 26,59,RT

## 2020-12-07 PROCEDURE — 73030 X-RAY EXAM OF SHOULDER: CPT | Mod: 26,RT

## 2020-12-07 PROCEDURE — 23575 CLTX SCAP FX W/MNPJ +-TRACTJ: CPT | Mod: RT

## 2020-12-07 PROCEDURE — 73110 X-RAY EXAM OF WRIST: CPT

## 2020-12-07 PROCEDURE — 73090 X-RAY EXAM OF FOREARM: CPT | Mod: 26,LT

## 2020-12-07 PROCEDURE — 93971 EXTREMITY STUDY: CPT

## 2020-12-07 PROCEDURE — 96374 THER/PROPH/DIAG INJ IV PUSH: CPT

## 2020-12-07 PROCEDURE — 73020 X-RAY EXAM OF SHOULDER: CPT

## 2020-12-07 PROCEDURE — 73060 X-RAY EXAM OF HUMERUS: CPT | Mod: 26,RT

## 2020-12-07 PROCEDURE — 99285 EMERGENCY DEPT VISIT HI MDM: CPT | Mod: 25

## 2020-12-07 PROCEDURE — 73090 X-RAY EXAM OF FOREARM: CPT

## 2020-12-07 PROCEDURE — 73080 X-RAY EXAM OF ELBOW: CPT | Mod: 26,RT

## 2020-12-07 PROCEDURE — 99283 EMERGENCY DEPT VISIT LOW MDM: CPT

## 2020-12-07 PROCEDURE — 73080 X-RAY EXAM OF ELBOW: CPT

## 2020-12-07 PROCEDURE — 93971 EXTREMITY STUDY: CPT | Mod: 26,RT

## 2020-12-07 PROCEDURE — 73060 X-RAY EXAM OF HUMERUS: CPT

## 2020-12-07 PROCEDURE — 73110 X-RAY EXAM OF WRIST: CPT | Mod: 26,RT

## 2020-12-07 PROCEDURE — 93931 UPPER EXTREMITY STUDY: CPT | Mod: 26,RT

## 2020-12-07 RX ORDER — HYDROMORPHONE HYDROCHLORIDE 2 MG/ML
0.5 INJECTION INTRAMUSCULAR; INTRAVENOUS; SUBCUTANEOUS ONCE
Refills: 0 | Status: DISCONTINUED | OUTPATIENT
Start: 2020-12-07 | End: 2020-12-07

## 2020-12-07 RX ORDER — DIAZEPAM 5 MG
2 TABLET ORAL ONCE
Refills: 0 | Status: DISCONTINUED | OUTPATIENT
Start: 2020-12-07 | End: 2020-12-07

## 2020-12-07 RX ADMIN — Medication 2 MILLIGRAM(S): at 16:41

## 2020-12-07 NOTE — ED PROVIDER NOTE - MUSCULOSKELETAL, MLM
Spine appears normal, range of motion is not limited, no muscle or joint tenderness but there I spoint tender to palp at shoulder and the rue is markedly swollen, ecchymotic the pt had a rin stuck on finger #4 r hand which was removed promptly upon arrival by ed rn

## 2020-12-07 NOTE — ED PROVIDER NOTE - CLINICAL SUMMARY MEDICAL DECISION MAKING FREE TEXT BOX
ro fx ro dvt treat pain ro dislocation or other injury in elderly pt iwht dementia uable to provide history

## 2020-12-07 NOTE — ED PROVIDER NOTE - PATIENT PORTAL LINK FT
You can access the FollowMyHealth Patient Portal offered by [facility] by registering at the following website: [email or web address]. By joining [facility]’s FollowMyHealth portal, you will also be able to view your health information using other applications (apps) compatible with our system.

## 2020-12-07 NOTE — CONSULT NOTE ADULT - CONSULT REASON
Vaccine Information Statement(s) was given today. This has been reviewed, questions answered, and verbal consent given by Parent for injection(s) and administration of Human papillomavirus (HPV) - Gardasil (patient did not wait the recommended 15 mins after receiving the HPV vaccine)-was fine with the previous two vaccines.    
Shoulder Dislocation

## 2020-12-07 NOTE — ED PROVIDER NOTE - CARE PLAN
Principal Discharge DX:	Shoulder dislocation, right, initial encounter  Secondary Diagnosis:	Glenoid fracture of shoulder, right, closed, initial encounter

## 2020-12-07 NOTE — ED PROCEDURE NOTE - CPROC ED POST RADIOGRAPHY1
post-procedure radiography performed/joint reduced/joint slides and pt's daughter aware will need surgery

## 2020-12-07 NOTE — ED PROVIDER NOTE - CARE PROVIDER_API CALL
Boris Mariscal)  Orthopaedic Surgery Surgery  [address] [place] [address]  Phone: [phone]  Fax: [phone]  Follow Up Time:

## 2020-12-07 NOTE — ED PROVIDER NOTE - PMH
BOOP (bronchiolitis obliterans with organizing pneumonia)  1991  Fibroids  1978  Granulomatous lung disease  [facility]oma [facility]  Intestinal obstruction  1999  Lichen of skin  vaginal area, 2012  Lymphadenopathy, inguinal  right 1988, removed surgically  MDS (myelodysplastic syndrome)  2013, found incidentally  Osteoarthritis    Osteoporosis    Pernicious anemia  2000  Pulmonary fibrosis  8/13

## 2020-12-07 NOTE — CONSULT NOTE ADULT - ASSESSMENT
A/P 85 F with right anterior shoulder dislocation  - NWB RUE in sling immobilization for 1 week  - Pain control  - Encourage active finger motion to reduce swelling  - Ice as needed  - Patient educated on fall prevention with sling  - Follow-up with Dr. Reyes as an outpatient, please call to schedule an appt.  - Discussed with attending who agrees with above plan. A/P 85 F with right anterior shoulder dislocation  - Patient with a very unstable GH joint, at high risk for re-diclocation  - Possible need for surgical intervention discussed with patient   - KHALIDA CANO in shoulder immobilizer  - Pain control  - Encourage active finger motion to reduce swelling  - Ice as needed  - Patient educated on fall prevention with sling  - Follow-up with Dr. Reyes as an outpatient, please call to schedule an appt.  - Discussed with attending who agrees with above plan. A/P 85 F with right anterior shoulder dislocation  - Patient with a very unstable GH joint, at high risk for re-dislocation  - Possible need for surgical intervention discussed with patient   - KHALIDA CANO in shoulder immobilizer  - Pain control  - Encourage active finger motion to reduce swelling  - Ice as needed  - Patient educated on fall prevention with sling  - Follow-up with Dr. Reyes as an outpatient, please call to schedule an appt.  - Discussed with attending who agrees with above plan.

## 2020-12-07 NOTE — ED PROVIDER NOTE - OBJECTIVE STATEMENT
pt has dementia= does not live wiht her pt lives alone  pt fell a week ago and was found by daughter to have swollen purple arm  but hx is not accurate because she is uncertain as she does not live here  pt agreed to come to hospial today   pmd dr audrey gutierrez  pt has painful swollen rue pt has dementia= does not live with her pt lives alone  pt fell a week ago and was found by daughter to have swollen purple arm  but hx is not accurate because she is uncertain as she does not live here  pt agreed to come to hospital today   pmd dr audrey gutierrez  pt has painful swollen rue

## 2020-12-07 NOTE — ED ADULT NURSE NOTE - NSIMPLEMENTINTERV_GEN_ALL_ED
Implemented All Fall with Harm Risk Interventions:  [place] to call system. Call bell, personal items and telephone within reach. Instruct patient to call for assistance. Room bathroom lighting operational. Non-slip footwear when patient is off stretcher. Physically safe environment: no spills, clutter or unnecessary equipment. Stretcher in lowest position, wheels locked, appropriate side rails in place. Provide visual cue, wrist band, yellow gown, etc. Monitor gait and stability. Monitor for mental status changes and reorient to person, place, and time. Review medications for side effects contributing to fall risk. Reinforce activity limits and safety measures with patient and family. Provide visual clues: red socks.

## 2020-12-07 NOTE — ED PROVIDER NOTE - PSH
S/P hip replacement  left 2013  S/P hip replacement  right 2004  S/P hysterectomy  Left ovary left in place, 1978  S/P tubal ligation  1976

## 2020-12-07 NOTE — ED PROVIDER NOTE - PROGRESS NOTE DETAILS
xrays resulted pt has dislocated shoulder, ortho paged case daughter who is aware of diagnoses and will stay with patient shoulder with assistance from ortho was reduced, but is very loose and liekly to dislocate again will need surgery   daughter made aware of this and will come to  pt in 30 mn

## 2020-12-07 NOTE — ED PROVIDER NOTE - CONSTITUTIONAL, MLM
normal... Well appearing, awake, alert, oriented to person, place, time/situation and in no apparent distress. moving rue spontaneously Regular

## 2020-12-07 NOTE — CONSULT NOTE ADULT - SUBJECTIVE AND OBJECTIVE BOX
85yFemale c/o R shoulder pain and swelling s/p mechanical fall several days ago. Patient states she fell last week onto her right shoulder and has since had discomfort, RUE swelling and decreased ROM. Patient denies additional falls/trauma. She denies prior history of dislocation. Denies additional injury. Patient denies head hit or LOC. Patient denies numbness or tingling in the RUE. No other orthopedic concerns       PMH:  BOOP (bronchiolitis obliterans with organizing pneumonia)    MDS (myelodysplastic syndrome)    Intestinal obstruction    Lichen of skin    Pernicious anemia    Osteoporosis    Osteoarthritis    Pulmonary fibrosis    Granulomatous lung disease    Lymphadenopathy, inguinal    Fibroids      PSH:  S/P hip replacement    S/P hip replacement    S/P hysterectomy    S/P tubal ligation      AH:  PC Pen VK (Other (Mild to Mod))    Meds: See med rec    T(C): 36.3 (12-07-20 @ 10:44)  HR: 57 (12-07-20 @ 10:44)  BP: 130/55 (12-07-20 @ 10:44)  RR: 18 (12-07-20 @ 10:44)  SpO2: 98% (12-07-20 @ 10:44)  Wt(kg): --    Gen: NAD  RUE:  skin intact, diffuse swelling/ecchymosis of the right upper extremity   + sulcus sign  SILT radial/median/ulnar/axillary  +AIN/PIN/Ulnar/Radial/Musc/Median,   +elbow/wrist ROM   shoulder ROM limited 2/2 pain,   + Pulse  Compartments soft and compressible     Secondary Assessment:  NC/AT, NTTP of clavicles, NTTP of C-,T-,L-Spine, NTTP of Pelvis  UEs: NTTP of L Shoulder, Elbows, Wrists, Hands; NT with AROM/PROM of L Shoulder, Elbows, Wrists, Hands; AIN/PIN/Med/Uln/Msc/Rad/Ax intact  LEs: Able to SLR, NT with Log Roll, NT with Heel Strike, NTTP of Hips, Knees, Ankles, Feet; NT with AROM/PROM of Hips, Knees, Ankles, Feet; Q/H/Gsc/TA/EHL/FHL intact      Imaging:  XR demonstrating R anterior shoulder dislocation    Procedure:  The benefits and risks of joint injection were explained to the patient, whom agreed to proceed with the injection. Under aspetic conditions, 30 cc of lidocaine/saline was injected into the affected joint. The patient received light sedation with 2 mg valium. A reduction manuever was preformed with post-reduction xrays demonstrating a well-reduced GH joint. The patient tolerated the procedure well and there were no complications. Patient was neurovascularly intact after the procedure.

## 2020-12-07 NOTE — ED PROVIDER NOTE - NSFOLLOWUPINSTRUCTIONS_ED_ALL_ED_FT
tylenol for pain  keep arm in sling  ice to shoulder and arm  no sports   no reaching pulling lifting pushing with arm  follow up with orthopedist dr Mariscal (upper extremity specialist on call)  and your personal physician tylenol for pain  keep arm in sling  ice to shoulder and arm  no sports   no reaching pulling lifting pushing with arm  follow up with orthopedist dr Mariscal (upper extremity specialist on call)  and your personal physician      Closed Reduction    WHAT YOU NEED TO KNOW:    Closed reduction is a procedure to put the pieces of a broken bone back into the right place without surgery. Closed reduction is used when your bone is broken in one place and the bone pieces have not gone through the skin. It is also used when you do not need hardware such as pins, screws, or plates to hold the pieces of bone in place. It is best if closed reduction can be done as soon as possible after your bone is broken.    DISCHARGE INSTRUCTIONS:    Call your local emergency number (911 in the [place]) if:   •You feel lightheaded, short of breath, and have chest pain.      •You cough up blood.      Call your doctor if:   •Your arm or leg feels warm, tender, and painful. It may look swollen and red.      •You have severe pain, even after you take medicine.      •Your injured limb is red or swollen.      •You have numbness in your fingers or toes.      •You have a fever or chills.      •There is a foul smell coming from inside your splint or cast.      •Your splint or cast gets damaged, wet, or cracks.      •Your splint or cast feels too tight or too loose.      •You have questions or concerns about your condition or care.      Medicines: You may need any of the following:   •Acetaminophen decreases pain and fever. It is available without a doctor's order. Ask how much to take and how often to take it. Follow directions. Read the labels of all other medicines you are using to see if they also contain acetaminophen, or ask your doctor or pharmacist. Acetaminophen can cause liver damage if not taken correctly. Do not use more than 4 grams (4,000 milligrams) total of acetaminophen in one day.       •NSAIDs, such as ibuprofen, help decrease swelling, pain, and fever. NSAIDs can cause stomach bleeding or kidney problems in certain people. If you take blood thinner medicine, always ask if NSAIDs are safe for you. Always read the medicine label and follow directions. Do not give these medicines to children under 6 months of age without direction from your child's healthcare provider.      •Prescription pain medicine may be given. Ask your healthcare provider how to take this medicine safely. Some prescription pain medicines contain acetaminophen. Do not take other medicines that contain acetaminophen without talking to your healthcare provider. Too much acetaminophen may cause liver damage. Prescription pain medicine may cause constipation. Ask your healthcare provider how to prevent or treat constipation.       •Take your medicine as directed. Contact your healthcare provider if you think your medicine is not helping or if you have side effects. Tell him or her if you are allergic to any medicine. Keep a list of the medicines, vitamins, and herbs you take. Include the amounts, and when and why you take them. Bring the list or the pill bottles to follow-up visits. Carry your medicine list with you in case of an emergency.      Care for yourself at home:   •Use crutches or a walker as directed if you broke a bone in your leg or foot. These devices will help you walk and take some weight off your injured leg or foot.      •Rest your limb as much as possible. Ask your healthcare provider when you can return to daily activities. You will need to limit activities or exercise while your bone heals.      •Elevate your injured area above the level of your heart as often as you can. This will help decrease swelling and pain. Prop your cast or splint on pillows or blankets to keep it elevated comfortably.       •Apply ice on your broken bone for 15 to 20 minutes every hour or as directed. Use an ice pack, or put crushed ice in a plastic bag. Cover it with a towel. Ice helps prevent tissue damage and decreases swelling and pain.      •Do not take baths, soak in a hot tub, or go swimming until your provider says it is okay.      •Go to physical therapy if directed. Physical therapy usually starts after your bones have healed and your splint or cast is removed. A physical therapist teaches you exercises to help improve movement and strength.      •Do not wear rings on your fingers if the break was in an arm or hand. Don't wear rings on your toes if the break was in the leg or foot.      Cast and splint care:   •Check your cast every day. Call your healthcare provider if you notice any cracks, dents, holes, or flaking on your cast.       •Keep your splint or cast clean and dry. Cover your splint or cast with a towel when you eat. If your splint or cast gets dirty, use a mild detergent and a damp washcloth to wipe off the outside. Continue to cover your splint or cast with trash bags to keep it dry while you bathe.       •Care for the edges of your cast. Cover the cast edges to keep them smooth. Use 4 inch pieces of waterproof tape. Place one end of the tape under the inside edge of your cast and fold it over to the outside surface. Overlap tape strips until the edges are completely covered. Change the tape as directed. Do not pull or repair any of the padding from inside the cast. This could cause blisters and sores on the skin under your cast.       •Keep weight off your splint or cast. Do not let anyone push down or lean on your splint or cast. This may cause it to break.      •Do not put sharp objects in the splint or cast. Do not use a sharp or pointed object to scratch under your splint or cast. This may cause wounds that can get infected, or you may lose the item inside. If your skin itches, blow cool air into the splint or cast. You may also gently scratch your skin outside with a cloth.       Follow up with your healthcare provider as directed: You may need to return for x-rays or to have your splint changed to a cast. Write down your questions so you remember to ask them during your visits.

## 2020-12-08 ENCOUNTER — APPOINTMENT (OUTPATIENT)
Dept: INTERNAL MEDICINE | Facility: CLINIC | Age: 85
End: 2020-12-08
Payer: MEDICARE

## 2020-12-08 VITALS
TEMPERATURE: 97.2 F | SYSTOLIC BLOOD PRESSURE: 118 MMHG | WEIGHT: 87 LBS | DIASTOLIC BLOOD PRESSURE: 82 MMHG | HEIGHT: 55 IN | BODY MASS INDEX: 20.13 KG/M2

## 2020-12-08 PROCEDURE — 99214 OFFICE O/P EST MOD 30 MIN: CPT | Mod: 25

## 2020-12-08 PROCEDURE — 94010 BREATHING CAPACITY TEST: CPT

## 2020-12-08 PROCEDURE — 94729 DIFFUSING CAPACITY: CPT

## 2020-12-08 PROCEDURE — 94726 PLETHYSMOGRAPHY LUNG VOLUMES: CPT

## 2020-12-08 NOTE — PHYSICAL EXAM
[General Appearance - Well Developed] : well developed [Well Groomed] : well groomed [General Appearance - Well Nourished] : well nourished [General Appearance - In No Acute Distress] : no acute distress [Jugular Venous Distention Increased] : there was no jugular-venous distention [Heart Rate And Rhythm] : heart rate and rhythm were normal [Heart Sounds] : normal S1 and S2 [Edema] : no peripheral edema present [] : no respiratory distress [Respiration, Rhythm And Depth] : normal respiratory rhythm and effort [Exaggerated Use Of Accessory Muscles For Inspiration] : no accessory muscle use [Bowel Sounds] : normal bowel sounds [Abdomen Soft] : soft [Abnormal Walk] : normal gait [Skin Color & Pigmentation] : normal skin color and pigmentation [Oriented To Time, Place, And Person] : oriented to person, place, and time [Affect] : the affect was normal [Nail Clubbing] : no clubbing of the fingernails [Cyanosis, Localized] : no localized cyanosis [FreeTextEntry1] : \rod In 1991, 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 mass lesion. Underwent VATS with open lung biopsy by Dr. Herman at [facility]. 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

## 2020-12-08 NOTE — HISTORY OF PRESENT ILLNESS
[None] : ~He/She~ has no significant interval events [URI] : upper respiratory tract infection [Exercise] : exercise [Former] : former [Never] : never [Cough] : coughing [Wheezing] : wheezing [Nasal Passage Blockage (Stuffiness)] : edema [Nonspecific Pain, Swelling, And Stiffness] : chest pain [Fever] : fever [Difficulty Breathing During Exertion] : dyspnea on exertion [Feelings Of Weakness On Exertion] : exercise intolerance [Wt Gain ___ kg] : No recent weight gain [Wt Loss ___ kg] : Recent [unfilled] kg(s) weight loss [Intermittent] : Intermittent [NC] : Nasal Cannula [Nightly] : Nightly [TextBox_4] : Comes in for routine pulmonary follow-up for interstitial lung disease.  Has been lost to follow-up since October 2019.  Brought in by daughter.  Having issues with progressive dementia.  Recently fell and dislocated her right shoulder–is in a splint.\par She denies any new respiratory complaints.  She wears her oxygen at bedtime with sleep. [FreeTextEntry1] : 2 [TextBox_42] : 2017 [With Patient/Caregiver] : With Patient/Caregiver [Designated Healthcare Proxy] : Designated healthcare proxy [Relationship: ___] : Relationship: [unfilled] [AdvancecareDate] : 12/20 [More Frequent Use Needed Recently] : Patient reports no recent increase in frequency of

## 2020-12-08 NOTE — REVIEW OF SYSTEMS
[As Noted in HPI] : as noted in HPI [Dyspnea] : dyspnea [Negative] : Sleep Disorder [Recent Wt Loss (___ Lbs)] : recent [unfilled] ~Ulb weight loss [Trauma] : ~T physical trauma [Memory Loss] : ~T memory lapses or loss

## 2020-12-08 NOTE — ASSESSMENT
[FreeTextEntry1] : ILD/HSP\par Clinically stable\par PFT's remain stable as well\par \par Will monitor CT chest - wishes to do f/u in 2020 = RX given\par PFT's and 6 minute walk with next visit\par Flu vaccine given today\par No longer smokes\par CO\par Continue oxygen with exercise and with sleep at 2 LPM via nasal cannula\par To call for any pulmonary issues\par Wishes to RTC in 6 months and as needed

## 2020-12-11 ENCOUNTER — EMERGENCY (EMERGENCY)
Facility: HOSPITAL | Age: 85
LOS: 1 days | Discharge: HOME | End: 2020-12-11
Attending: EMERGENCY MEDICINE | Admitting: EMERGENCY MEDICINE
Payer: MEDICARE

## 2020-12-11 VITALS
SYSTOLIC BLOOD PRESSURE: 106 MMHG | TEMPERATURE: 98 F | RESPIRATION RATE: 16 BRPM | HEART RATE: 77 BPM | DIASTOLIC BLOOD PRESSURE: 62 MMHG | OXYGEN SATURATION: 96 %

## 2020-12-11 VITALS
WEIGHT: 85.1 LBS | RESPIRATION RATE: 18 BRPM | HEART RATE: 92 BPM | OXYGEN SATURATION: 99 % | HEIGHT: 58 IN | TEMPERATURE: 97 F | DIASTOLIC BLOOD PRESSURE: 64 MMHG | SYSTOLIC BLOOD PRESSURE: 114 MMHG

## 2020-12-11 DIAGNOSIS — Z90.710 ACQUIRED ABSENCE OF BOTH CERVIX AND UTERUS: Chronic | ICD-10-CM

## 2020-12-11 DIAGNOSIS — Z96.60 PRESENCE OF UNSPECIFIED ORTHOPEDIC JOINT IMPLANT: Chronic | ICD-10-CM

## 2020-12-11 DIAGNOSIS — Z98.51 TUBAL LIGATION STATUS: Chronic | ICD-10-CM

## 2020-12-11 PROCEDURE — 99284 EMERGENCY DEPT VISIT MOD MDM: CPT | Mod: 25

## 2020-12-11 PROCEDURE — 99284 EMERGENCY DEPT VISIT MOD MDM: CPT

## 2020-12-11 PROCEDURE — 96374 THER/PROPH/DIAG INJ IV PUSH: CPT

## 2020-12-11 PROCEDURE — 73020 X-RAY EXAM OF SHOULDER: CPT | Mod: 26,59,RT

## 2020-12-11 PROCEDURE — 96372 THER/PROPH/DIAG INJ SC/IM: CPT | Mod: XU

## 2020-12-11 PROCEDURE — 96375 TX/PRO/DX INJ NEW DRUG ADDON: CPT

## 2020-12-11 PROCEDURE — 73030 X-RAY EXAM OF SHOULDER: CPT

## 2020-12-11 PROCEDURE — 73030 X-RAY EXAM OF SHOULDER: CPT | Mod: 26,RT

## 2020-12-11 PROCEDURE — 73020 X-RAY EXAM OF SHOULDER: CPT

## 2020-12-11 RX ORDER — DIAZEPAM 5 MG
2 TABLET ORAL ONCE
Refills: 0 | Status: DISCONTINUED | OUTPATIENT
Start: 2020-12-11 | End: 2020-12-11

## 2020-12-11 RX ORDER — HYDROMORPHONE HYDROCHLORIDE 2 MG/ML
0.5 INJECTION INTRAMUSCULAR; INTRAVENOUS; SUBCUTANEOUS ONCE
Refills: 0 | Status: DISCONTINUED | OUTPATIENT
Start: 2020-12-11 | End: 2020-12-11

## 2020-12-11 RX ADMIN — HYDROMORPHONE HYDROCHLORIDE 0.5 MILLIGRAM(S): 2 INJECTION INTRAMUSCULAR; INTRAVENOUS; SUBCUTANEOUS at 16:41

## 2020-12-11 RX ADMIN — Medication 2 MILLIGRAM(S): at 17:42

## 2020-12-11 RX ADMIN — Medication 2 MILLIGRAM(S): at 16:41

## 2020-12-11 NOTE — ED PROVIDER NOTE - PMH
BOOP (bronchiolitis obliterans with organizing pneumonia)  1991  Fibroids  1978  Granulomatous lung disease  1995 at [facility] had granuloma removed  Intestinal obstruction  1999  Lichen of skin  vaginal area, 2012  Lymphadenopathy, inguinal  right 1988, removed surgically  MDS (myelodysplastic syndrome)  2013, found incidentally  Osteoarthritis    Osteoporosis    Pernicious anemia  2000  Pulmonary fibrosis  8/13

## 2020-12-11 NOTE — ED ADULT NURSE NOTE - OBJECTIVE STATEMENT
pt states she was here recently for a dislocated shoulder, today presents with inability to move right arm. pt denies trauma to arm or a fall.

## 2020-12-11 NOTE — ED PROVIDER NOTE - CLINICAL SUMMARY MEDICAL DECISION MAKING FREE TEXT BOX
From: Sergei Iesha  To: PUJA Hercules  Sent: 7/14/2020 10:20 AM PDT  Subject: Procedure Question    I am doing great, no symptoms. My wife still has a infrequent cough but otherwise she is great.   I will get in contact with [facility].   Thank you      ----- Message -----   From:PUJA Hercules   Sent:7/14/2020 10:06 AM PDT   To:Sergei Julian   Subject:RE: Procedure Question    Sergei  I am hoping you are well and not having symptoms. Hoping your wife is recovering.   I would reach out to the [facility] department for direction and ease of getting retested.   Keep in touch if you need anything.  PUJA Hercules      ----- Message -----   From:Sergei Iesha   Sent:7/14/2020 9:27 AM PDT   To:PUJA Hercules   Subject:Procedure Question    Good morning, I am in an odd situation. My wife tested positive for COVID-19 as I tested negative 2 weeks ago, and I have had to isolate with her and my children for two weeks as well. My work place is requesting for me to get re-tested before I go back to work. And I am hoping that you could help or advise me where and when to go get tested again. Thank you for your time.  
right shoulder pain x 3-4 days, xr, meds, ortho

## 2020-12-11 NOTE — ED ADULT NURSE NOTE - NSIMPLEMENTINTERV_GEN_ALL_ED
Implemented All Fall with Harm Risk Interventions:  [place] to call system. Call bell, personal items and telephone within reach. Instruct patient to call for assistance. Room bathroom lighting operational. Non-slip footwear when patient is off stretcher. Physically safe environment: no spills, clutter or unnecessary equipment. Stretcher in lowest position, wheels locked, appropriate side rails in place. Provide visual cue, wrist band, yellow gown, etc. Monitor gait and stability. Monitor for mental status changes and reorient to person, place, and time. Review medications for side effects contributing to fall risk. Reinforce activity limits and safety measures with patient and family. Provide visual clues: red socks.

## 2020-12-11 NOTE — CONSULT NOTE ADULT - SUBJECTIVE AND OBJECTIVE BOX
85yFemale c/o R shoulder pain and swelling s/p mechanical fall several days ago. Patient states she fell 2 weeks ago onto her right shoulder. She was seen here at [facility] on Nov 7, 2020 found to have a right anterior shoulder dislocation and underwent successful closed reduction and was discharged to follow up outpatient. At her outpatient visit today she was found to be dislocated and was sent to the ED for closed reduction. She denies fall/ abrupt movement to the shoulder. Complains of discomfort, RUE swelling and decreased ROM. Patient denies additional falls/trauma. Denies additional injury. Patient denies head hit or LOC. Patient denies numbness or tingling in the RUE. No other orthopedic concerns       PMH:  BOOP (bronchiolitis obliterans with organizing pneumonia)    MDS (myelodysplastic syndrome)    Intestinal obstruction    Lichen of skin    Pernicious anemia    Osteoporosis    Osteoarthritis    Pulmonary fibrosis    Granulomatous lung disease    Lymphadenopathy, inguinal    Fibroids      PSH:  S/P hip replacement    S/P hip replacement    S/P hysterectomy    S/P tubal ligation      AH:  PC Pen VK (Other (Mild to Mod))    Meds: See med rec    Vital Signs Last 24 Hrs  T(C): 36.3 (11 Dec 2020 15:57), Max: 36.3 (11 Dec 2020 15:57)  T(F): 97.3 (11 Dec 2020 15:57), Max: 97.3 (11 Dec 2020 15:57)  HR: 92 (11 Dec 2020 15:57) (92 - 92)  BP: 114/64 (11 Dec 2020 15:57) (114/64 - 114/64)  BP(mean): --  RR: 18 (11 Dec 2020 15:57) (18 - 18)  SpO2: 99% (11 Dec 2020 15:57) (99% - 99%)    Gen: NAD  RUE:  skin intact, diffuse swelling/ecchymosis of the right upper extremity   + sulcus sign  SILT radial/median/ulnar/axillary  +AIN/PIN/Ulnar/Radial/Musc/Median,   +elbow/wrist ROM   shoulder ROM limited 2/2 pain,   + Pulse  Compartments soft and compressible     Secondary Assessment:  NC/AT, NTTP of clavicles, NTTP of C-,T-,L-Spine, NTTP of Pelvis  UEs: NTTP of L Shoulder, Elbows, Wrists, Hands; NT with AROM/PROM of L Shoulder, Elbows, Wrists, Hands; AIN/PIN/Med/Uln/Msc/Rad/Ax intact  LEs: Able to SLR, NT with Log Roll, NT with Heel Strike, NTTP of Hips, Knees, Ankles, Feet; NT with AROM/PROM of Hips, Knees, Ankles, Feet; Q/H/Gsc/TA/EHL/FHL intact      Imaging:  XR demonstrating R anterior shoulder dislocation          Assessment and Recommendation:   · Assessment	  A/P 85 F with right anterior shoulder dislocation  - Patient with a very unstable GH joint, at high risk for re-dislocation  - Possible need for surgical intervention discussed with patient   - KHALIDA CANO in shoulder immobilizer  - Pain control  - Encourage active finger motion to reduce swelling  - Ice as needed  - Patient educated on fall prevention with sling  - Follow-up with Dr. Reyes as an outpatient  - will discuss with attending and advise with changes 85yFemale c/o R shoulder pain and swelling s/p mechanical fall several days ago. Patient states she fell 2 weeks ago onto her right shoulder. She was seen here at [facility] on Nov 7, 2020 found to have a right anterior shoulder dislocation and underwent successful closed reduction and was discharged to follow up outpatient. At her outpatient visit today she was found to be dislocated and was sent to the ED for closed reduction. She denies fall/ abrupt movement to the shoulder. Complains of discomfort, RUE swelling and decreased ROM. Patient denies additional falls/trauma. Denies additional injury. Patient denies head hit or LOC. Patient denies numbness or tingling in the RUE. No other orthopedic concerns       PMH:  BOOP (bronchiolitis obliterans with organizing pneumonia)    MDS (myelodysplastic syndrome)    Intestinal obstruction    Lichen of skin    Pernicious anemia    Osteoporosis    Osteoarthritis    Pulmonary fibrosis    Granulomatous lung disease    Lymphadenopathy, inguinal    Fibroids      PSH:  S/P hip replacement    S/P hip replacement    S/P hysterectomy    S/P tubal ligation      AH:  PC Pen VK (Other (Mild to Mod))    Meds: See med rec    Vital Signs Last 24 Hrs  T(C): 36.3 (11 Dec 2020 15:57), Max: 36.3 (11 Dec 2020 15:57)  T(F): 97.3 (11 Dec 2020 15:57), Max: 97.3 (11 Dec 2020 15:57)  HR: 92 (11 Dec 2020 15:57) (92 - 92)  BP: 114/64 (11 Dec 2020 15:57) (114/64 - 114/64)  BP(mean): --  RR: 18 (11 Dec 2020 15:57) (18 - 18)  SpO2: 99% (11 Dec 2020 15:57) (99% - 99%)    Gen: NAD  RUE:  skin intact, diffuse swelling/ecchymosis of the right upper extremity   + sulcus sign  SILT radial/median/ulnar/axillary  +AIN/PIN/Ulnar/Radial/Musc/Median,   +elbow/wrist ROM   shoulder ROM limited 2/2 pain,   + Pulse  Compartments soft and compressible     Secondary Assessment:  NC/AT, NTTP of clavicles, NTTP of C-,T-,L-Spine, NTTP of Pelvis  UEs: NTTP of L Shoulder, Elbows, Wrists, Hands; NT with AROM/PROM of L Shoulder, Elbows, Wrists, Hands; AIN/PIN/Med/Uln/Msc/Rad/Ax intact  LEs: Able to SLR, NT with Log Roll, NT with Heel Strike, NTTP of Hips, Knees, Ankles, Feet; NT with AROM/PROM of Hips, Knees, Ankles, Feet; Q/H/Gsc/TA/EHL/FHL intact      Imaging:  XR demonstrating R anterior shoulder dislocation

## 2020-12-11 NOTE — ED PROVIDER NOTE - OBJECTIVE STATEMENT
Here for ortho eval for right shoulder dislocation. Here for ortho eval for right shoulder dislocation.  No other complaints.

## 2020-12-11 NOTE — CONSULT NOTE ADULT - ASSESSMENT
Assessment and Recommendation:   · Assessment	  A/P 85 F with right anterior shoulder dislocation  - Patient with a very unstable GH joint, at high risk for re-dislocation  - Patient reduced/spontaneously dislocated multiple times in ED  - Possible need for surgical intervention discussed with patient   - KHALIDA CANO in shoulder immobilizer  - Pain control  - Encourage active finger motion to reduce swelling  - Ice as needed  - Patient educated on fall prevention with sling  - Discussed with attending and if unable to stay reduced, ok to d/c dislocated with close follow up   - Follow-up with Dr. Mariscal as an outpatient  - Will discuss with attending and advise with changes Assessment and Recommendation:   · Assessment	  A/P 85 F with right anterior shoulder dislocation  - Patient with a very unstable GH joint, at high risk for re-dislocation  - Patient reduced/spontaneously dislocated multiple times in ED  - Probable need for surgical intervention discussed with patient   - KHALIDA CANO in shoulder immobilizer  - Pain control  - Encourage active finger motion to reduce swelling  - Ice as needed  - Patient educated on fall prevention with sling  - Discussed with attending and if unable to stay reduced, ok to d/c dislocated with close follow up   - Follow-up with Dr. Mariscal as an outpatient  - Will discuss with attending and advise with changes Assessment and Recommendation:   · Assessment	  A/P 85 F with right anterior shoulder dislocation  - Patient with a very unstable GH joint, at high risk for re-dislocation  - Patient reduced/spontaneously dislocated multiple times in ED  - Probable need for surgical intervention discussed with patient   - KHALIDA CANO in shoulder immobilizer  - Pain control  - Encourage active finger motion to reduce swelling  - Ice as needed  - Patient educated on fall prevention with sling  - Discussed with attending and if unable to stay reduced, ok to d/c dislocated with close follow up   - Discussed with patients daughter Kamille who understands about shoulder instability and agrees to outpatient follow up   - Follow-up with Dr. Mariscal as an outpatient for discussion of treatment options   - Ortho stable for discharge   - Will discuss with attending and advise with changes

## 2020-12-11 NOTE — ED ADULT NURSE NOTE - PMH
BOOP (bronchiolitis obliterans with organizing pneumonia)  1991  Fibroids  1978  Granulomatous lung disease  1995 at [facility] had granuloma removed  Intestinal obstruction  1999  Lichen of skin  vaginal area, 2012  Lymphadenopathy, inguinal  right 1988, removed surgically  MDS (myelodysplastic syndrome)  2013, found incidentally  Osteoarthritis    Osteoporosis    Pernicious anemia  2000  Pulmonary fibrosis  8/13

## 2020-12-11 NOTE — ED PROVIDER NOTE - NSFOLLOWUPINSTRUCTIONS_ED_ALL_ED_FT
1. Take Tylenol 650mg every 4-6 hours for 5 days.      [employer]® CareNotes®     :  [facility]  	                       SHOULDER DISLOCATION - AfterCare(R) Instructions(ER/ED)           Shoulder Dislocation    WHAT YOU NEED TO KNOW:    A shoulder dislocation happens when the top of your arm bone (humerus) moves out of the socket in your shoulder blade.    Shoulder Anatomy         DISCHARGE INSTRUCTIONS:    Return to the emergency department if:   •Your shoulder and arm become pale or cold.      •You cannot move your shoulder and arm.      •You have more redness or swelling in your shoulder.      •Your shoulder becomes dislocated again.      Call your doctor if:   •You have a fever.      •You have more pain in your shoulder and arm even after you rest and take your medicine.      •You have new weakness or numbness in your shoulder and arm.      •You have questions or concerns about your condition or care.      Medicines:You may need any of the following:   •Prescription pain medicine may be given. Ask your healthcare provider how to take this medicine safely. Some prescription pain medicines contain acetaminophen. Do not take other medicines that contain acetaminophen without talking to your healthcare provider. Too much acetaminophen may cause liver damage. Prescription pain medicine may cause constipation. Ask your healthcare provider how to prevent or treat constipation.       •A muscle relaxer may help the tight muscles in your shoulder relax.      •Take your medicine as directed. Contact your healthcare provider if you think your medicine is not helping or if you have side effects. Tell him or her if you are allergic to any medicine. Keep a list of the medicines, vitamins, and herbs you take. Include the amounts, and when and why you take them. Bring the list or the pill bottles to follow-up visits. Carry your medicine list with you in case of an emergency.      Rest your shoulder and arm: Rest helps your muscles and tissues heal. Avoid activities that cause pain or use an overhead arm motion. Your healthcare provider will tell you when it is safe to return to sports or other daily activities.    How to wear a brace, sling, or splint: A brace, sling, or splint may be needed to limit your movement and protect your shoulder.    Shoulder Sling     •Wear your brace, sling, or splint all the time. Take it off only to bathe or do exercises as directed. Ask your healthcare provider how many weeks you should wear it.      •Keep your skin clean and dry. Place padding under your armpit to help absorb sweat and prevent sores on your skin.      •Do not hunch your shoulders. This may cause pain. Keep your shoulders relaxed.      •Support your wrist and hand with the sling. Cover the knuckles on your hand with your sling. Your wrist should be positioned higher than your elbow. Your wrist may start to hurt or go numb if your sling is too short.      Apply ice: Ice helps decrease swelling and pain. Ice may also help prevent tissue damage. Use an ice pack, or put crushed ice in a plastic bag. Cover it with a towel before you place it on your shoulder. Apply ice for 15 to 20 minutes every hour or as directed.    Exercises: Begin gentle exercises as directed. After healing begins, you may start light exercises so your shoulder does not get stiff. Go to physical therapy if directed. A physical therapist teaches you exercises to help improve movement and strength, and to decrease pain. Do not lift heavy objects or do any exercise that causes severe pain.    Follow up with your doctor in 5 to 10 days: Write down your questions so you remember to ask them during your visits.        © Copyright [employer] 2020           back to top                          © Copyright [employer] 2020

## 2020-12-11 NOTE — ED PROVIDER NOTE - CARE PROVIDER_API CALL
Boris Mariscal)  Orthopaedic Surgery Surgery  [address] [place] [address]  Phone: [phone]  Fax: [phone]  Follow Up Time: 1-3 Days

## 2020-12-11 NOTE — ED PROVIDER NOTE - PATIENT PORTAL LINK FT
You can access the FollowMyHealth Patient Portal offered by [facility] by registering at the following website: [email or web address]. By joining [employer]’s FollowMyHealth portal, you will also be able to view your health information using other applications (apps) compatible with our system.

## 2020-12-22 ENCOUNTER — OUTPATIENT (OUTPATIENT)
Dept: OUTPATIENT SERVICES | Facility: HOSPITAL | Age: 85
LOS: 1 days | End: 2020-12-22
Payer: MEDICARE

## 2020-12-22 VITALS
TEMPERATURE: 97 F | RESPIRATION RATE: 16 BRPM | SYSTOLIC BLOOD PRESSURE: 137 MMHG | WEIGHT: 93.92 LBS | DIASTOLIC BLOOD PRESSURE: 65 MMHG | OXYGEN SATURATION: 100 % | HEART RATE: 58 BPM

## 2020-12-22 DIAGNOSIS — Z96.60 PRESENCE OF UNSPECIFIED ORTHOPEDIC JOINT IMPLANT: Chronic | ICD-10-CM

## 2020-12-22 DIAGNOSIS — S43.421A SPRAIN OF RIGHT ROTATOR CUFF CAPSULE, INITIAL ENCOUNTER: ICD-10-CM

## 2020-12-22 DIAGNOSIS — J84.9 INTERSTITIAL PULMONARY DISEASE, UNSPECIFIED: ICD-10-CM

## 2020-12-22 DIAGNOSIS — Z98.51 TUBAL LIGATION STATUS: Chronic | ICD-10-CM

## 2020-12-22 DIAGNOSIS — Z01.818 ENCOUNTER FOR OTHER PREPROCEDURAL EXAMINATION: ICD-10-CM

## 2020-12-22 DIAGNOSIS — Z41.9 ENCOUNTER FOR PROCEDURE FOR PURPOSES OTHER THAN REMEDYING HEALTH STATE, UNSPECIFIED: Chronic | ICD-10-CM

## 2020-12-22 DIAGNOSIS — Z90.710 ACQUIRED ABSENCE OF BOTH CERVIX AND UTERUS: Chronic | ICD-10-CM

## 2020-12-22 LAB
ALBUMIN SERPL ELPH-MCNC: 3.1 G/DL — LOW (ref 3.3–5)
ALP SERPL-CCNC: 39 U/L — LOW (ref 40–120)
ALT FLD-CCNC: 35 U/L — SIGNIFICANT CHANGE UP (ref 12–78)
ANION GAP SERPL CALC-SCNC: 4 MMOL/L — LOW (ref 5–17)
AST SERPL-CCNC: 21 U/L — SIGNIFICANT CHANGE UP (ref 15–37)
BILIRUB SERPL-MCNC: 0.4 MG/DL — SIGNIFICANT CHANGE UP (ref 0.2–1.2)
BUN SERPL-MCNC: 32 MG/DL — HIGH (ref 7–23)
CALCIUM SERPL-MCNC: 9 MG/DL — SIGNIFICANT CHANGE UP (ref 8.5–10.1)
CHLORIDE SERPL-SCNC: 104 MMOL/L — SIGNIFICANT CHANGE UP (ref 96–108)
CO2 SERPL-SCNC: 31 MMOL/L — SIGNIFICANT CHANGE UP (ref 22–31)
CREAT SERPL-MCNC: 0.73 MG/DL — SIGNIFICANT CHANGE UP (ref 0.5–1.3)
GLUCOSE SERPL-MCNC: 95 MG/DL — SIGNIFICANT CHANGE UP (ref 70–99)
HCT VFR BLD CALC: 31.1 % — LOW (ref 34.5–45)
HGB BLD-MCNC: 10.1 G/DL — LOW (ref 11.5–15.5)
MCHC RBC-ENTMCNC: 32.5 GM/DL — SIGNIFICANT CHANGE UP (ref 32–36)
MCHC RBC-ENTMCNC: 37 PG — HIGH (ref 27–34)
MCV RBC AUTO: 113.9 FL — HIGH (ref 80–100)
NRBC # BLD: 0 /100 WBCS — SIGNIFICANT CHANGE UP (ref 0–0)
PLATELET # BLD AUTO: 201 K/UL — SIGNIFICANT CHANGE UP (ref 150–400)
POTASSIUM SERPL-MCNC: 4.2 MMOL/L — SIGNIFICANT CHANGE UP (ref 3.5–5.3)
POTASSIUM SERPL-SCNC: 4.2 MMOL/L — SIGNIFICANT CHANGE UP (ref 3.5–5.3)
PROT SERPL-MCNC: 6.5 G/DL — SIGNIFICANT CHANGE UP (ref 6–8.3)
RBC # BLD: 2.73 M/UL — LOW (ref 3.8–5.2)
RBC # FLD: 14.8 % — HIGH (ref 10.3–14.5)
SODIUM SERPL-SCNC: 139 MMOL/L — SIGNIFICANT CHANGE UP (ref 135–145)
WBC # BLD: 8.76 K/UL — SIGNIFICANT CHANGE UP (ref 3.8–10.5)
WBC # FLD AUTO: 8.76 K/UL — SIGNIFICANT CHANGE UP (ref 3.8–10.5)

## 2020-12-22 PROCEDURE — 71046 X-RAY EXAM CHEST 2 VIEWS: CPT | Mod: 26

## 2020-12-22 PROCEDURE — 36415 COLL VENOUS BLD VENIPUNCTURE: CPT

## 2020-12-22 PROCEDURE — 85027 COMPLETE CBC AUTOMATED: CPT

## 2020-12-22 PROCEDURE — 93005 ELECTROCARDIOGRAM TRACING: CPT

## 2020-12-22 PROCEDURE — 71046 X-RAY EXAM CHEST 2 VIEWS: CPT

## 2020-12-22 PROCEDURE — 93010 ELECTROCARDIOGRAM REPORT: CPT

## 2020-12-22 PROCEDURE — G0463: CPT

## 2020-12-22 PROCEDURE — 80053 COMPREHEN METABOLIC PANEL: CPT

## 2020-12-22 NOTE — H&P PST ADULT - NSICDXPROBLEM_GEN_ALL_CORE_FT
PROBLEM DIAGNOSES  Problem: Preoperative testing  Assessment and Plan: Medical clearance needed as per surgeon. CBC, Comprehensive panel and EKG ordered. Pre-op instructions and surgical scrubs given and pt verbalized understanding. COVID19 PCR testing to be done within 72 hours of surgery at [facility].       Problem: Sprain of right rotator cuff capsule, initial encounter  Assessment and Plan: Right shoulder arthroscopy, closed reduction possible open, rotator cuff repair, labral repair on 12/30/2020.       Problem: ILD (interstitial lung disease)  Assessment and Plan: Last seen by pulmonologist on 12/8/2020 and office note in chart.        PROBLEM DIAGNOSES  Problem: Preoperative testing  Assessment and Plan: Medical clearance needed as per surgeon. CBC, Comprehensive panel, CXR and EKG ordered. Pre-op instructions and surgical scrubs given and pt verbalized understanding. COVID19 PCR testing to be done within 72 hours of surgery at [facility].       Problem: Sprain of right rotator cuff capsule, initial encounter  Assessment and Plan: Right shoulder arthroscopy, closed reduction possible open, rotator cuff repair, labral repair on 12/30/2020.       Problem: ILD (interstitial lung disease)  Assessment and Plan: Last seen by pulmonologist on 12/8/2020 and office note in chart.

## 2020-12-22 NOTE — H&P PST ADULT - HISTORY OF PRESENT ILLNESS
79yo female with a history of pulmonary fibrosis and s/p VATS 8/95 has had increasing SOB with exertion treated with oxygen 2LPM, followed closely by pulmonary md with CXRs, referred to Dr. Maldonado for VAT and biopsy.      Pt  84 y/o female with PMH of mild dementia and interstitial lung disease here for PST. Pt s/p fall in the her garage and sustained injury to right shoulder. Pt brought to [place] ER on 12/7 and on 12/11 and diagnosed with anterior dislocation of the humeral head relation to the glenoid but unable to be reduced in the ER after multiple reductions. Pt referred to surgeon. Pt denies right shoulder pain. Pt with sling to right shoulder. Pt electing for right shoulder arthroscopy, closed reduction possible open, rotator cuff repair, labral repair on 12/30/2020.  84 y/o female with PMH of mild dementia and interstitial lung disease here for PST. Pt s/p fall in her garage and sustained injury to right shoulder. Pt brought to [place] ER on 12/7 and on 12/11 and diagnosed with anterior dislocation of the humeral head relation to the glenoid but unable to be reduced in the ER after multiple reductions. Pt referred to surgeon. Pt denies right shoulder pain. Pt with sling to right shoulder. Pt electing for right shoulder arthroscopy, closed reduction possible open, rotator cuff repair, labral repair on 12/30/2020.

## 2020-12-22 NOTE — H&P PST ADULT - NSICDXPASTSURGICALHX_GEN_ALL_CORE_FT
PAST SURGICAL HISTORY:  S/P hip replacement right 2004    S/P hip replacement left 2013    S/P hysterectomy Left ovary left in place, 1978    S/P tubal ligation 1976     PAST SURGICAL HISTORY:  Elective surgery (VAT and biopsy, 2014)    S/P hip replacement right 2004    S/P hip replacement left 2013    S/P hysterectomy Left ovary left in place, 1978    S/P tubal ligation 1976

## 2020-12-22 NOTE — H&P PST ADULT - MUSCULOSKELETAL COMMENTS
Problem: Patient Care Overview  Goal: Plan of Care Review  Outcome: Ongoing (interventions implemented as appropriate)  Pt free of falls during shift. VSS. Peripheral IV intact. Fluids infusing. Tele ordered pt on the monitor. Pt on RA no shortness of breath noted. Pain controlled as much as possible with ordered meds. Call light in reach. Hourly rounds done. Family present at bedside. Will continue to monitor       See HPI Right shoulder - decreased ROM, in sling, (+) edema and ecchymosis of right hand

## 2020-12-22 NOTE — H&P PST ADULT - NSICDXPASTMEDICALHX_GEN_ALL_CORE_FT
PAST MEDICAL HISTORY:  BOOP (bronchiolitis obliterans with organizing pneumonia) 1991    Fibroids 1978    Granulomatous lung disease 1995 at [facility] had granuloma removed    Intestinal obstruction 1999    Lichen of skin vaginal area, 2012    Lymphadenopathy, inguinal right 1988, removed surgically    MDS (myelodysplastic syndrome) 2013, found incidentally    Osteoarthritis     Osteoporosis     Pernicious anemia 2000    Pulmonary fibrosis 8/13     PAST MEDICAL HISTORY:  BOOP (bronchiolitis obliterans with organizing pneumonia) 1991    Fibroids 1978    Granulomatous lung disease 1995 at [facility] had granuloma removed    ILD (interstitial lung disease)     Intestinal obstruction 1999    Lichen of skin vaginal area, 2012    Lymphadenopathy, inguinal right 1988, removed surgically    MDS (myelodysplastic syndrome) 2013, found incidentally    Mild dementia     Osteoarthritis     Osteoporosis     Pernicious anemia 2000    Pulmonary fibrosis 8/13    Sprain of right rotator cuff capsule, initial encounter

## 2020-12-22 NOTE — H&P PST ADULT - GENERAL COMMENTS
Pt denies history of travel outside the country and outside [facility], denies having had the COVID19 infection and denies COVID19 positive contacts within the last 14 days.

## 2020-12-22 NOTE — H&P PST ADULT - RS GEN PE MLT RESP DETAILS PC
breath sounds equal/good air movement/no wheezes/rales normal/airway patent/breath sounds equal/good air movement/respirations non-labored/clear to auscultation bilaterally

## 2020-12-22 NOTE — H&P PST ADULT - OTHER CARE PROVIDERS
Marshall Singh (cardiologist) [phone];, Lorrie Arenas (pulmonary) [phone] Lorrie Arenas (pulmonary) [phone]

## 2020-12-22 NOTE — H&P PST ADULT - NSANTHOSAYNRD_GEN_A_CORE
No. ALVAREZ screening performed.  STOP BANG Legend: 0-2 = LOW Risk; 3-4 = INTERMEDIATE Risk; 5-8 = HIGH Risk

## 2020-12-24 PROBLEM — S43.421A SPRAIN OF RIGHT ROTATOR CUFF CAPSULE, INITIAL ENCOUNTER: Chronic | Status: ACTIVE | Noted: 2020-12-22

## 2020-12-24 PROBLEM — J84.9 INTERSTITIAL PULMONARY DISEASE, UNSPECIFIED: Chronic | Status: ACTIVE | Noted: 2020-12-22

## 2020-12-24 PROBLEM — F03.90 UNSPECIFIED DEMENTIA, UNSPECIFIED SEVERITY, WITHOUT BEHAVIORAL DISTURBANCE, PSYCHOTIC DISTURBANCE, MOOD DISTURBANCE, AND ANXIETY: Chronic | Status: ACTIVE | Noted: 2020-12-22

## 2020-12-28 ENCOUNTER — OUTPATIENT (OUTPATIENT)
Dept: OUTPATIENT SERVICES | Facility: HOSPITAL | Age: 85
LOS: 1 days | End: 2020-12-28
Payer: MEDICARE

## 2020-12-28 DIAGNOSIS — Z90.710 ACQUIRED ABSENCE OF BOTH CERVIX AND UTERUS: Chronic | ICD-10-CM

## 2020-12-28 DIAGNOSIS — Z96.60 PRESENCE OF UNSPECIFIED ORTHOPEDIC JOINT IMPLANT: Chronic | ICD-10-CM

## 2020-12-28 DIAGNOSIS — Z98.51 TUBAL LIGATION STATUS: Chronic | ICD-10-CM

## 2020-12-28 DIAGNOSIS — Z41.9 ENCOUNTER FOR PROCEDURE FOR PURPOSES OTHER THAN REMEDYING HEALTH STATE, UNSPECIFIED: Chronic | ICD-10-CM

## 2020-12-28 DIAGNOSIS — Z20.828 CONTACT WITH AND (SUSPECTED) EXPOSURE TO OTHER VIRAL COMMUNICABLE DISEASES: ICD-10-CM

## 2020-12-28 LAB — SARS-COV-2 RNA SPEC QL NAA+PROBE: SIGNIFICANT CHANGE UP

## 2020-12-28 PROCEDURE — U0003: CPT

## 2020-12-29 ENCOUNTER — TRANSCRIPTION ENCOUNTER (OUTPATIENT)
Age: 85
End: 2020-12-29

## 2020-12-29 NOTE — ASU PATIENT PROFILE, ADULT - PMH
BOOP (bronchiolitis obliterans with organizing pneumonia)  1991  Fibroids  1978  Granulomatous lung disease  1995 at [facility] had granuloma removed  ILD (interstitial lung disease)    Intestinal obstruction  1999  Lichen of skin  vaginal area, 2012  Lymphadenopathy, inguinal  right 1988, removed surgically  MDS (myelodysplastic syndrome)  2013, found incidentally  Mild dementia    Osteoarthritis    Osteoporosis    Pernicious anemia  2000  Pulmonary fibrosis  8/13  Sprain of right rotator cuff capsule, initial encounter

## 2020-12-29 NOTE — ASU PATIENT PROFILE, ADULT - PSH
Elective surgery  (VAT and biopsy, 2014)  S/P hip replacement  left 2013  S/P hip replacement  right 2004  S/P hysterectomy  Left ovary left in place, 1978  S/P tubal ligation  1976

## 2020-12-30 ENCOUNTER — OUTPATIENT (OUTPATIENT)
Dept: OUTPATIENT SERVICES | Facility: HOSPITAL | Age: 85
LOS: 1 days | End: 2020-12-30
Payer: MEDICARE

## 2020-12-30 VITALS
WEIGHT: 95.02 LBS | DIASTOLIC BLOOD PRESSURE: 53 MMHG | OXYGEN SATURATION: 100 % | HEART RATE: 52 BPM | RESPIRATION RATE: 16 BRPM | SYSTOLIC BLOOD PRESSURE: 130 MMHG | TEMPERATURE: 99 F | HEIGHT: 59 IN

## 2020-12-30 VITALS
DIASTOLIC BLOOD PRESSURE: 61 MMHG | OXYGEN SATURATION: 98 % | HEART RATE: 55 BPM | RESPIRATION RATE: 16 BRPM | SYSTOLIC BLOOD PRESSURE: 111 MMHG

## 2020-12-30 DIAGNOSIS — Z96.60 PRESENCE OF UNSPECIFIED ORTHOPEDIC JOINT IMPLANT: Chronic | ICD-10-CM

## 2020-12-30 DIAGNOSIS — Z41.9 ENCOUNTER FOR PROCEDURE FOR PURPOSES OTHER THAN REMEDYING HEALTH STATE, UNSPECIFIED: Chronic | ICD-10-CM

## 2020-12-30 DIAGNOSIS — S43.421A SPRAIN OF RIGHT ROTATOR CUFF CAPSULE, INITIAL ENCOUNTER: ICD-10-CM

## 2020-12-30 DIAGNOSIS — Z90.710 ACQUIRED ABSENCE OF BOTH CERVIX AND UTERUS: Chronic | ICD-10-CM

## 2020-12-30 DIAGNOSIS — Z98.51 TUBAL LIGATION STATUS: Chronic | ICD-10-CM

## 2020-12-30 PROCEDURE — 23655 CLTX SHO DSLC W/MNPJ W/ANES: CPT | Mod: RT

## 2020-12-30 PROCEDURE — 86901 BLOOD TYPING SEROLOGIC RH(D): CPT

## 2020-12-30 PROCEDURE — 76000 FLUOROSCOPY <1 HR PHYS/QHP: CPT

## 2020-12-30 PROCEDURE — 36415 COLL VENOUS BLD VENIPUNCTURE: CPT

## 2020-12-30 PROCEDURE — 86900 BLOOD TYPING SEROLOGIC ABO: CPT

## 2020-12-30 PROCEDURE — 86850 RBC ANTIBODY SCREEN: CPT

## 2020-12-30 RX ORDER — SODIUM CHLORIDE 9 MG/ML
1000 INJECTION, SOLUTION INTRAVENOUS
Refills: 0 | Status: DISCONTINUED | OUTPATIENT
Start: 2020-12-30 | End: 2020-12-30

## 2020-12-30 RX ORDER — ACETAMINOPHEN 500 MG
650 TABLET ORAL ONCE
Refills: 0 | Status: COMPLETED | OUTPATIENT
Start: 2020-12-30 | End: 2020-12-30

## 2020-12-30 RX ADMIN — SODIUM CHLORIDE 25 MILLILITER(S): 9 INJECTION, SOLUTION INTRAVENOUS at 18:19

## 2020-12-30 RX ADMIN — Medication 260 MILLIGRAM(S): at 18:19

## 2020-12-30 NOTE — PROGRESS NOTE ADULT - SUBJECTIVE AND OBJECTIVE BOX
Full report dictated.  discussed with patient health care proxy, andrey babin.  patient underwent attempt at closed reduction in operatinng room.  shoulder is locked in position and reduction not possible.  discussed possibility of reverse shoulder replacement to be performed in the future.

## 2020-12-30 NOTE — ASU DISCHARGE PLAN (ADULT/PEDIATRIC) - CARE PROVIDER_API CALL
Boris Mariscal)  Orthopaedic Surgery Surgery  [address] [place] [address]  Phone: [phone]  Fax: [phone]  Follow Up Time:

## 2020-12-30 NOTE — ASU DISCHARGE PLAN (ADULT/PEDIATRIC) - ASU DC SPECIAL INSTRUCTIONSFT
Closed Reduction Instructions    1) PAIN & SWELLING: Your shoulder may swell over the next 48-72 hours and pain may get a bit worse. Ice it plenty, continuously if possible. Fill up a plastic bag, then wrap it in a towel or pillow case and lay it on your shoulder.     2) ACTIVITY: Please keep your arm in the shoulder immobilizer and move fingers often. Otherwise you should be up and about, walking as much as you can tolerate. The more you move the better you will do. No weight bearing on the arm yet.     3) ISSUES: Only reason to worry would be if pain got so severe that you cannot feel or wiggle your fingers. In this case you need to call or come to the ER. But as long as you can feel or wiggle your fingers, you are fine.    4) FOLLOW UP: Call Dr. Mariscal's office to schedule a follow up appointment to be seen in 10-14 days. Your sutures (if applicable) will be removed at that point.    5) HOME MEDICATIONS: resume your home medications as you normally do.

## 2020-12-30 NOTE — BRIEF OPERATIVE NOTE - NSICDXBRIEFPROCEDURE_GEN_ALL_CORE_FT
PROCEDURES:  Closed reduction of right shoulder 30-Dec-2020 18:26:44 Attempted and failed closed reduction Steve Hdez

## 2020-12-30 NOTE — BRIEF OPERATIVE NOTE - NSICDXBRIEFPREOP_GEN_ALL_CORE_FT
PRE-OP DIAGNOSIS:  Recurrent joint dislocation, of the shoulder region, right 30-Dec-2020 18:27:15  Steve Hdez

## 2020-12-30 NOTE — BRIEF OPERATIVE NOTE - NSICDXBRIEFPOSTOP_GEN_ALL_CORE_FT
POST-OP DIAGNOSIS:  Recurrent joint dislocation, of the shoulder region, right 30-Dec-2020 18:27:19  Steve Hdez

## 2021-01-05 ENCOUNTER — OUTPATIENT (OUTPATIENT)
Dept: OUTPATIENT SERVICES | Facility: HOSPITAL | Age: 86
LOS: 1 days | End: 2021-01-05
Payer: MEDICARE

## 2021-01-05 DIAGNOSIS — Z98.51 TUBAL LIGATION STATUS: Chronic | ICD-10-CM

## 2021-01-05 DIAGNOSIS — Z96.60 PRESENCE OF UNSPECIFIED ORTHOPEDIC JOINT IMPLANT: Chronic | ICD-10-CM

## 2021-01-05 DIAGNOSIS — Z20.828 CONTACT WITH AND (SUSPECTED) EXPOSURE TO OTHER VIRAL COMMUNICABLE DISEASES: ICD-10-CM

## 2021-01-05 DIAGNOSIS — Z41.9 ENCOUNTER FOR PROCEDURE FOR PURPOSES OTHER THAN REMEDYING HEALTH STATE, UNSPECIFIED: Chronic | ICD-10-CM

## 2021-01-05 DIAGNOSIS — Z90.710 ACQUIRED ABSENCE OF BOTH CERVIX AND UTERUS: Chronic | ICD-10-CM

## 2021-01-05 LAB — SARS-COV-2 RNA SPEC QL NAA+PROBE: SIGNIFICANT CHANGE UP

## 2021-01-05 PROCEDURE — U0003: CPT

## 2021-01-05 PROCEDURE — U0005: CPT

## 2021-01-06 ENCOUNTER — TRANSCRIPTION ENCOUNTER (OUTPATIENT)
Age: 86
End: 2021-01-06

## 2021-01-06 RX ORDER — VANCOMYCIN HCL 1 G
750 VIAL (EA) INTRAVENOUS ONCE
Refills: 0 | Status: DISCONTINUED | OUTPATIENT
Start: 2021-01-07 | End: 2021-01-08

## 2021-01-06 RX ORDER — SODIUM CHLORIDE 9 MG/ML
1000 INJECTION, SOLUTION INTRAVENOUS
Refills: 0 | Status: DISCONTINUED | OUTPATIENT
Start: 2021-01-07 | End: 2021-01-07

## 2021-01-07 ENCOUNTER — TRANSCRIPTION ENCOUNTER (OUTPATIENT)
Age: 86
End: 2021-01-07

## 2021-01-07 ENCOUNTER — INPATIENT (INPATIENT)
Facility: HOSPITAL | Age: 86
LOS: 0 days | Discharge: HOME | DRG: 483 | End: 2021-01-08
Attending: ORTHOPAEDIC SURGERY | Admitting: ORTHOPAEDIC SURGERY
Payer: MEDICARE

## 2021-01-07 VITALS
WEIGHT: 93.92 LBS | DIASTOLIC BLOOD PRESSURE: 60 MMHG | RESPIRATION RATE: 18 BRPM | TEMPERATURE: 97 F | HEART RATE: 56 BPM | OXYGEN SATURATION: 100 % | SYSTOLIC BLOOD PRESSURE: 130 MMHG | HEIGHT: 57 IN

## 2021-01-07 DIAGNOSIS — Z41.9 ENCOUNTER FOR PROCEDURE FOR PURPOSES OTHER THAN REMEDYING HEALTH STATE, UNSPECIFIED: Chronic | ICD-10-CM

## 2021-01-07 DIAGNOSIS — M25.311 OTHER INSTABILITY, RIGHT SHOULDER: ICD-10-CM

## 2021-01-07 DIAGNOSIS — D62 ACUTE POSTHEMORRHAGIC ANEMIA: ICD-10-CM

## 2021-01-07 DIAGNOSIS — S43.421A SPRAIN OF RIGHT ROTATOR CUFF CAPSULE, INITIAL ENCOUNTER: ICD-10-CM

## 2021-01-07 DIAGNOSIS — Z90.710 ACQUIRED ABSENCE OF BOTH CERVIX AND UTERUS: Chronic | ICD-10-CM

## 2021-01-07 DIAGNOSIS — F03.90 UNSPECIFIED DEMENTIA WITHOUT BEHAVIORAL DISTURBANCE: ICD-10-CM

## 2021-01-07 DIAGNOSIS — Z96.60 PRESENCE OF UNSPECIFIED ORTHOPEDIC JOINT IMPLANT: Chronic | ICD-10-CM

## 2021-01-07 DIAGNOSIS — I95.9 HYPOTENSION, UNSPECIFIED: ICD-10-CM

## 2021-01-07 DIAGNOSIS — Z98.51 TUBAL LIGATION STATUS: Chronic | ICD-10-CM

## 2021-01-07 LAB
ANION GAP SERPL CALC-SCNC: 7 MMOL/L — SIGNIFICANT CHANGE UP (ref 5–17)
BUN SERPL-MCNC: 29 MG/DL — HIGH (ref 7–23)
CALCIUM SERPL-MCNC: 7 MG/DL — LOW (ref 8.5–10.1)
CHLORIDE SERPL-SCNC: 108 MMOL/L — SIGNIFICANT CHANGE UP (ref 96–108)
CO2 SERPL-SCNC: 25 MMOL/L — SIGNIFICANT CHANGE UP (ref 22–31)
CREAT SERPL-MCNC: 0.74 MG/DL — SIGNIFICANT CHANGE UP (ref 0.5–1.3)
GLUCOSE SERPL-MCNC: 168 MG/DL — HIGH (ref 70–99)
HCT VFR BLD CALC: 25.5 % — LOW (ref 34.5–45)
HCT VFR BLD CALC: 28.6 % — LOW (ref 34.5–45)
HGB BLD-MCNC: 8.3 G/DL — LOW (ref 11.5–15.5)
HGB BLD-MCNC: 9.7 G/DL — LOW (ref 11.5–15.5)
MCHC RBC-ENTMCNC: 32.5 GM/DL — SIGNIFICANT CHANGE UP (ref 32–36)
MCHC RBC-ENTMCNC: 33.8 PG — SIGNIFICANT CHANGE UP (ref 27–34)
MCHC RBC-ENTMCNC: 33.9 GM/DL — SIGNIFICANT CHANGE UP (ref 32–36)
MCHC RBC-ENTMCNC: 36.2 PG — HIGH (ref 27–34)
MCV RBC AUTO: 111.4 FL — HIGH (ref 80–100)
MCV RBC AUTO: 99.7 FL — SIGNIFICANT CHANGE UP (ref 80–100)
NRBC # BLD: 0 /100 WBCS — SIGNIFICANT CHANGE UP (ref 0–0)
NRBC # BLD: 0 /100 WBCS — SIGNIFICANT CHANGE UP (ref 0–0)
PLATELET # BLD AUTO: 124 K/UL — LOW (ref 150–400)
PLATELET # BLD AUTO: 141 K/UL — LOW (ref 150–400)
POTASSIUM SERPL-MCNC: 4.4 MMOL/L — SIGNIFICANT CHANGE UP (ref 3.5–5.3)
POTASSIUM SERPL-SCNC: 4.4 MMOL/L — SIGNIFICANT CHANGE UP (ref 3.5–5.3)
RBC # BLD: 2.29 M/UL — LOW (ref 3.8–5.2)
RBC # BLD: 2.87 M/UL — LOW (ref 3.8–5.2)
RBC # FLD: 14.5 % — SIGNIFICANT CHANGE UP (ref 10.3–14.5)
RBC # FLD: 20.5 % — HIGH (ref 10.3–14.5)
SODIUM SERPL-SCNC: 140 MMOL/L — SIGNIFICANT CHANGE UP (ref 135–145)
WBC # BLD: 17.5 K/UL — HIGH (ref 3.8–10.5)
WBC # BLD: 5.37 K/UL — SIGNIFICANT CHANGE UP (ref 3.8–10.5)
WBC # FLD AUTO: 17.5 K/UL — HIGH (ref 3.8–10.5)
WBC # FLD AUTO: 5.37 K/UL — SIGNIFICANT CHANGE UP (ref 3.8–10.5)

## 2021-01-07 RX ORDER — ONDANSETRON 8 MG/1
4 TABLET, FILM COATED ORAL EVERY 6 HOURS
Refills: 0 | Status: DISCONTINUED | OUTPATIENT
Start: 2021-01-08 | End: 2021-01-08

## 2021-01-07 RX ORDER — SODIUM CHLORIDE 9 MG/ML
1000 INJECTION, SOLUTION INTRAVENOUS ONCE
Refills: 0 | Status: DISCONTINUED | OUTPATIENT
Start: 2021-01-07 | End: 2021-01-07

## 2021-01-07 RX ORDER — OXYCODONE HYDROCHLORIDE 5 MG/1
5 TABLET ORAL ONCE
Refills: 0 | Status: DISCONTINUED | OUTPATIENT
Start: 2021-01-07 | End: 2021-01-08

## 2021-01-07 RX ORDER — BUPIVACAINE 13.3 MG/ML
20 INJECTION, SUSPENSION, LIPOSOMAL INFILTRATION ONCE
Refills: 0 | Status: DISCONTINUED | OUTPATIENT
Start: 2021-01-07 | End: 2021-01-08

## 2021-01-07 RX ORDER — OXYCODONE HYDROCHLORIDE 5 MG/1
2.5 TABLET ORAL EVERY 4 HOURS
Refills: 0 | Status: DISCONTINUED | OUTPATIENT
Start: 2021-01-08 | End: 2021-01-08

## 2021-01-07 RX ORDER — DONEPEZIL HYDROCHLORIDE 10 MG/1
10 TABLET, FILM COATED ORAL AT BEDTIME
Refills: 0 | Status: DISCONTINUED | OUTPATIENT
Start: 2021-01-08 | End: 2021-01-08

## 2021-01-07 RX ORDER — ACETAMINOPHEN 500 MG
650 TABLET ORAL EVERY 6 HOURS
Refills: 0 | Status: DISCONTINUED | OUTPATIENT
Start: 2021-01-08 | End: 2021-01-08

## 2021-01-07 RX ORDER — OXYCODONE HYDROCHLORIDE 5 MG/1
5 TABLET ORAL EVERY 4 HOURS
Refills: 0 | Status: DISCONTINUED | OUTPATIENT
Start: 2021-01-08 | End: 2021-01-08

## 2021-01-07 RX ORDER — ASCORBIC ACID 60 MG
500 TABLET,CHEWABLE ORAL
Refills: 0 | Status: DISCONTINUED | OUTPATIENT
Start: 2021-01-08 | End: 2021-01-08

## 2021-01-07 RX ORDER — LANOLIN ALCOHOL/MO/W.PET/CERES
3 CREAM (GRAM) TOPICAL AT BEDTIME
Refills: 0 | Status: DISCONTINUED | OUTPATIENT
Start: 2021-01-08 | End: 2021-01-08

## 2021-01-07 RX ORDER — POLYETHYLENE GLYCOL 3350 17 G/17G
17 POWDER, FOR SOLUTION ORAL AT BEDTIME
Refills: 0 | Status: DISCONTINUED | OUTPATIENT
Start: 2021-01-08 | End: 2021-01-08

## 2021-01-07 RX ORDER — SODIUM CHLORIDE 9 MG/ML
1000 INJECTION, SOLUTION INTRAVENOUS
Refills: 0 | Status: DISCONTINUED | OUTPATIENT
Start: 2021-01-07 | End: 2021-01-07

## 2021-01-07 RX ORDER — SODIUM CHLORIDE 9 MG/ML
500 INJECTION, SOLUTION INTRAVENOUS
Refills: 0 | Status: DISCONTINUED | OUTPATIENT
Start: 2021-01-07 | End: 2021-01-08

## 2021-01-07 RX ORDER — MORPHINE SULFATE 50 MG/1
1 CAPSULE, EXTENDED RELEASE ORAL EVERY 4 HOURS
Refills: 0 | Status: DISCONTINUED | OUTPATIENT
Start: 2021-01-08 | End: 2021-01-08

## 2021-01-07 RX ORDER — FOLIC ACID 0.8 MG
1 TABLET ORAL DAILY
Refills: 0 | Status: DISCONTINUED | OUTPATIENT
Start: 2021-01-08 | End: 2021-01-08

## 2021-01-07 RX ORDER — ONDANSETRON 8 MG/1
4 TABLET, FILM COATED ORAL ONCE
Refills: 0 | Status: DISCONTINUED | OUTPATIENT
Start: 2021-01-07 | End: 2021-01-08

## 2021-01-07 RX ORDER — ASPIRIN/CALCIUM CARB/MAGNESIUM 324 MG
81 TABLET ORAL DAILY
Refills: 0 | Status: DISCONTINUED | OUTPATIENT
Start: 2021-01-08 | End: 2021-01-08

## 2021-01-07 RX ORDER — SODIUM CHLORIDE 9 MG/ML
500 INJECTION, SOLUTION INTRAVENOUS ONCE
Refills: 0 | Status: COMPLETED | OUTPATIENT
Start: 2021-01-07 | End: 2021-01-07

## 2021-01-07 RX ORDER — BENZOCAINE AND MENTHOL 5; 1 G/100ML; G/100ML
1 LIQUID ORAL
Refills: 0 | Status: DISCONTINUED | OUTPATIENT
Start: 2021-01-08 | End: 2021-01-08

## 2021-01-07 RX ORDER — PANTOPRAZOLE SODIUM 20 MG/1
40 TABLET, DELAYED RELEASE ORAL
Refills: 0 | Status: DISCONTINUED | OUTPATIENT
Start: 2021-01-08 | End: 2021-01-08

## 2021-01-07 RX ORDER — HYDROMORPHONE HYDROCHLORIDE 2 MG/ML
0.5 INJECTION INTRAMUSCULAR; INTRAVENOUS; SUBCUTANEOUS
Refills: 0 | Status: DISCONTINUED | OUTPATIENT
Start: 2021-01-07 | End: 2021-01-08

## 2021-01-07 RX ORDER — HYDROCORTISONE 20 MG
100 TABLET ORAL ONCE
Refills: 0 | Status: COMPLETED | OUTPATIENT
Start: 2021-01-07 | End: 2021-01-07

## 2021-01-07 RX ORDER — SODIUM CHLORIDE 9 MG/ML
1000 INJECTION, SOLUTION INTRAVENOUS
Refills: 0 | Status: DISCONTINUED | OUTPATIENT
Start: 2021-01-07 | End: 2021-01-08

## 2021-01-07 RX ORDER — SENNA PLUS 8.6 MG/1
2 TABLET ORAL AT BEDTIME
Refills: 0 | Status: DISCONTINUED | OUTPATIENT
Start: 2021-01-08 | End: 2021-01-08

## 2021-01-07 RX ADMIN — Medication 100 MILLIGRAM(S): at 20:34

## 2021-01-07 RX ADMIN — SODIUM CHLORIDE 50 MILLILITER(S): 9 INJECTION, SOLUTION INTRAVENOUS at 13:08

## 2021-01-07 RX ADMIN — SODIUM CHLORIDE 999 MILLILITER(S): 9 INJECTION, SOLUTION INTRAVENOUS at 21:24

## 2021-01-07 RX ADMIN — SODIUM CHLORIDE 1000 MILLILITER(S): 9 INJECTION, SOLUTION INTRAVENOUS at 19:30

## 2021-01-07 RX ADMIN — SODIUM CHLORIDE 100 MILLILITER(S): 9 INJECTION, SOLUTION INTRAVENOUS at 20:38

## 2021-01-07 NOTE — DISCHARGE NOTE PROVIDER - NSDCCPTREATMENT_GEN_ALL_CORE_FT
PRINCIPAL PROCEDURE  Procedure: Reverse total shoulder arthroplasty  Findings and Treatment: right shoulder

## 2021-01-07 NOTE — DISCHARGE NOTE PROVIDER - NSDCFUADDINST_GEN_ALL_CORE_FT
1.	Pain Control as needed  2.	Walking with full weight bearing as tolerated, with assistive devices (walker/Cane as Needed)  No weight bearing on right upper extremity in shoulder immobilizer  3.	DVT Prophylaxis for 30 days  4.	PT as needed  5.	Follow up with Dr. Mariscal as Outpatient in 10-14 Days after Discharge from the Hospital or Rehab. Call Office For Appointment.  6.	Remove Staples Post-Op Day 14, and Remove Dressing Post-Op Day 10, with Daily Dressing Changes as Need.  7.	Ice affected area as Needed  8.	Keep Dressing  Clean and dry.   1.	Pain Control as needed  2.	Walking with full weight bearing as tolerated, with assistive devices (walker/Cane as Needed)  No weight bearing on right upper extremity in shoulder immobilizer  3.	aspirin 81 for DVT Prophylaxis for 30 days  4.	PT as needed  5.	Follow up with Dr. Mariscal as Outpatient in 10-14 Days after Discharge from the Hospital or Rehab. Call Office For Appointment.  6.	Remove Staples Post-Op Day 14, and Remove Dressing Post-Op Day 10, with Daily Dressing Changes as Need.  7.	Ice affected area as Needed  8.	Keep Dressing  Clean and dry.

## 2021-01-07 NOTE — DISCHARGE NOTE PROVIDER - CARE PROVIDER_API CALL
Boris Mariscal)  Orthopaedic Surgery Surgery  [address] [place] [address]  Phone: [phone]  Fax: [phone]  Follow Up Time:

## 2021-01-07 NOTE — DISCHARGE NOTE PROVIDER - NSDCMRMEDTOKEN_GEN_ALL_CORE_FT
aspirin 81 mg oral delayed release tablet: 1 tab(s) orally once a day, pt instructed to stop 1/29/14  DONEPEZIL 10MG TAB: TAKE 1 TABLET BY MOUTH ONCE A DAY  MEMANTINE HCL ER 14MG EXT-RELEASE CAPSULE: TAKE 1 CAPSULE DAILY  FILL AS PER PATIENT  DEDUCT 5 CAPSULES  prednisoLONE 5 mg oral tablet: 1 tab(s) orally once a day   DONEPEZIL 10MG TAB: TAKE 1 TABLET BY MOUTH ONCE A DAY  Ecotrin Adult Low Strength 81 mg oral delayed release tablet: 1 tab(s) orally once a day for dvt ppx MDD:1  MEMANTINE HCL ER 14MG EXT-RELEASE CAPSULE: TAKE 1 CAPSULE DAILY  FILL AS PER PATIENT  DEDUCT 5 CAPSULES  oxyCODONE 5 mg oral tablet: 1 tab(s) orally every 4 to 6 hours, As Needed -for severe pain MDD:6

## 2021-01-07 NOTE — CONSULT NOTE ADULT - PROBLEM SELECTOR RECOMMENDATION 4
most likely secondary to acute blood loss anemia, but need to entertain possibility of secondary adrenal insufficiency (prednisolone 5mg daily listed on med rec)- will give solucortef 100mg iv once.  discussed with daughter by phone who states she is unaware of chronic prednisolone dose.  will attempt to contact patient's PCP in am for further info. most likely secondary to acute blood loss anemia, but need to entertain possibility of secondary adrenal insufficiency (prednisolone 5mg daily listed on med rec)- will give solucortef 100mg iv once.  discussed with daughter by phone who states she is unaware of chronic prednisolone use.  will attempt to contact patient's PCP in am for further info.

## 2021-01-07 NOTE — BRIEF OPERATIVE NOTE - NSICDXBRIEFPOSTOP_GEN_ALL_CORE_FT
POST-OP DIAGNOSIS:  Shoulder instability 07-Jan-2021 17:04:52 right shoulder recurrent instability Efe Ramos

## 2021-01-07 NOTE — CONSULT NOTE ADULT - SUBJECTIVE AND OBJECTIVE BOX
Patient is a 85y old  Female who presents with a chief complaint of r shoulder pain, adm for r shoulder arthroplasty  asked to eval pt in pacu re hypotension    INTERVAL HPI/OVERNIGHT EVENTS:  T(C): 36.6 (01-07-21 @ 18:45), Max: 36.6 (01-07-21 @ 18:45)  HR: 82 (01-07-21 @ 20:10) (56 - 94)  BP: 91/46 (01-07-21 @ 19:30) (82/45 - 130/60)  RR: 15 (01-07-21 @ 20:10) (14 - 19)  SpO2: 100% (01-07-21 @ 20:10) (97% - 100%)  Wt(kg): --  I&O's Summary      PAST MEDICAL & SURGICAL HISTORY:  Sprain of right rotator cuff capsule, initial encounter    Mild dementia    ILD (interstitial lung disease)    BOOP (bronchiolitis obliterans with organizing pneumonia)  1991    MDS (myelodysplastic syndrome)  2013, found incidentally    Intestinal obstruction  1999    Lichen of skin  vaginal area, 2012    Pernicious anemia  2000    Osteoporosis    Osteoarthritis    Pulmonary fibrosis  8/13    Granulomatous lung disease  1995 at [facility] had granuloma removed    Lymphadenopathy, inguinal  right 1988, removed surgically    Fibroids  1978    Elective surgery  (VAT and biopsy, 2014)    S/P hip replacement  left 2013    S/P hip replacement  right 2004    S/P hysterectomy  Left ovary left in place, 1978    S/P tubal ligation  1976        SOCIAL HISTORY  Alcohol: neg  Tobacco: neg  Illicit substance use: neg      FAMILY HISTORY: non contrib    Home Medications:  aspirin 81 mg oral delayed release tablet: 1 tab(s) orally once a day, pt instructed to stop 1/29/14 (07 Jan 2021 12:59)  DONEPEZIL 10MG TAB: TAKE 1 TABLET BY MOUTH ONCE A DAY (07 Jan 2021 12:59)  MEMANTINE HCL ER 14MG EXT-RELEASE CAPSULE: TAKE 1 CAPSULE DAILY  FILL AS PER PATIENT  DEDUCT 5 CAPSULES (07 Jan 2021 12:59)  prednisoLONE 5 mg oral tablet: 1 tab(s) orally once a day (07 Jan 2021 12:59)        LABS:                        9.7    17.50 )-----------( 124      ( 07 Jan 2021 19:33 )             28.6     01-07    140  |  108  |  29<H>  ----------------------------<  168<H>  4.4   |  25  |  0.74    Ca    7.0<L>      07 Jan 2021 19:33          CAPILLARY BLOOD GLUCOSE                MEDICATIONS  (STANDING):  BUpivacaine liposome 1.3% Injectable 20 milliLiter(s) Local Injection once  hydrocortisone sodium succinate Injectable 100 milliGRAM(s) IV Push once  lactated ringers Bolus 500 milliLiter(s) IV Bolus once  lactated ringers. 1000 milliLiter(s) (100 mL/Hr) IV Continuous <Continuous>  vancomycin  IVPB 750 milliGRAM(s) IV Intermittent once    MEDICATIONS  (PRN):  HYDROmorphone  Injectable 0.5 milliGRAM(s) IV Push every 10 minutes PRN Severe Pain (7 - 10)  ondansetron Injectable 4 milliGRAM(s) IV Push once PRN Nausea and/or Vomiting  oxyCODONE    IR 5 milliGRAM(s) Oral once PRN Moderate Pain (4 - 6)      REVIEW OF SYSTEMS:  CONSTITUTIONAL: No fever, weight loss, or fatigue  EYES: No eye pain, visual disturbances, or discharge  ENMT:  No difficulty hearing, tinnitus, vertigo; No sinus or throat pain  NECK: No pain or stiffness  RESPIRATORY: No cough, wheezing, chills or hemoptysis; No shortness of breath  CARDIOVASCULAR: No chest pain, palpitations, dizziness, or leg swelling  GASTROINTESTINAL: No abdominal or epigastric pain. No nausea, vomiting, or hematemesis; No diarrhea or constipation. No melena or hematochezia.  GENITOURINARY: No dysuria, frequency, hematuria, or incontinence  NEUROLOGICAL: No headaches, memory loss, loss of strength, numbness, or tremors  SKIN: No itching, burning, rashes, or lesions   LYMPH NODES: No enlarged glands  ENDOCRINE: No heat or cold intolerance; No hair loss  MUSCULOSKELETAL: No joint pain or swelling; No muscle, back, or extremity pain  PSYCHIATRIC: No depression, anxiety, mood swings, or difficulty sleeping  HEME/LYMPH: No easy bruising, or bleeding gums  ALLERY AND IMMUNOLOGIC: No hives or eczema    RADIOLOGY & ADDITIONAL TESTS:    Imaging Personally Reviewed:  [ ] YES  [ ] NO    Consultant(s) Notes Reviewed:  [ ] YES  [ ] NO        PHYSICAL EXAM:  GENERAL: NAD, well-groomed, well-developed  HEAD:  Atraumatic, Normocephalic  EYES: EOMI, PERRLA, conjunctiva and sclera clear  ENMT: No tonsillar erythema, exudates, or enlargement; Moist mucous membranes, Good dentition, No lesions  NECK: Supple, No JVD, Normal thyroid  NERVOUS SYSTEM:  Alert & Oriented X3, Good concentration; Motor Strength 5/5 B/L upper and lower extremities; DTRs 2+ intact and symmetric  CHEST/LUNG: Clear to percussion bilaterally; No rales, rhonchi, wheezing, or rubs  HEART: Regular rate and rhythm; No murmurs, rubs, or gallops  ABDOMEN: Soft, Nontender, Nondistended; Bowel sounds present  EXTREMITIES:  2+ Peripheral Pulses, No clubbing, cyanosis, or edema  LYMPH: No lymphadenopathy noted  SKIN: No rashes or lesions    Care Discussed with Consultants/Other Providers [ ] YES  [ ] NO

## 2021-01-07 NOTE — DISCHARGE NOTE PROVIDER - NSDCCPCAREPLAN_GEN_ALL_CORE_FT
PRINCIPAL DISCHARGE DIAGNOSIS  Diagnosis: Shoulder instability, right  Assessment and Plan of Treatment:

## 2021-01-08 ENCOUNTER — TRANSCRIPTION ENCOUNTER (OUTPATIENT)
Age: 86
End: 2021-01-08

## 2021-01-08 VITALS — OXYGEN SATURATION: 95 % | HEART RATE: 72 BPM | DIASTOLIC BLOOD PRESSURE: 65 MMHG | SYSTOLIC BLOOD PRESSURE: 121 MMHG

## 2021-01-08 LAB
ANION GAP SERPL CALC-SCNC: 13 MMOL/L — SIGNIFICANT CHANGE UP (ref 5–17)
BUN SERPL-MCNC: 29 MG/DL — HIGH (ref 7–23)
CALCIUM SERPL-MCNC: 7.6 MG/DL — LOW (ref 8.5–10.1)
CHLORIDE SERPL-SCNC: 106 MMOL/L — SIGNIFICANT CHANGE UP (ref 96–108)
CO2 SERPL-SCNC: 22 MMOL/L — SIGNIFICANT CHANGE UP (ref 22–31)
CREAT SERPL-MCNC: 0.98 MG/DL — SIGNIFICANT CHANGE UP (ref 0.5–1.3)
GLUCOSE SERPL-MCNC: 130 MG/DL — HIGH (ref 70–99)
HCT VFR BLD CALC: 31.3 % — LOW (ref 34.5–45)
HGB BLD-MCNC: 10.6 G/DL — LOW (ref 11.5–15.5)
MCHC RBC-ENTMCNC: 33.3 PG — SIGNIFICANT CHANGE UP (ref 27–34)
MCHC RBC-ENTMCNC: 33.9 GM/DL — SIGNIFICANT CHANGE UP (ref 32–36)
MCV RBC AUTO: 98.4 FL — SIGNIFICANT CHANGE UP (ref 80–100)
NRBC # BLD: 0 /100 WBCS — SIGNIFICANT CHANGE UP (ref 0–0)
PLATELET # BLD AUTO: 117 K/UL — LOW (ref 150–400)
POTASSIUM SERPL-MCNC: 4.5 MMOL/L — SIGNIFICANT CHANGE UP (ref 3.5–5.3)
POTASSIUM SERPL-SCNC: 4.5 MMOL/L — SIGNIFICANT CHANGE UP (ref 3.5–5.3)
RBC # BLD: 3.18 M/UL — LOW (ref 3.8–5.2)
RBC # FLD: 20.5 % — HIGH (ref 10.3–14.5)
SODIUM SERPL-SCNC: 141 MMOL/L — SIGNIFICANT CHANGE UP (ref 135–145)
WBC # BLD: 20.52 K/UL — HIGH (ref 3.8–10.5)
WBC # FLD AUTO: 20.52 K/UL — HIGH (ref 3.8–10.5)

## 2021-01-08 RX ORDER — ASPIRIN/CALCIUM CARB/MAGNESIUM 324 MG
1 TABLET ORAL
Qty: 30 | Refills: 0
Start: 2021-01-08 | End: 2021-02-06

## 2021-01-08 RX ORDER — PREDNISOLONE 5 MG
1 TABLET ORAL
Qty: 0 | Refills: 0 | DISCHARGE

## 2021-01-08 RX ORDER — VANCOMYCIN HCL 1 G
750 VIAL (EA) INTRAVENOUS ONCE
Refills: 0 | Status: COMPLETED | OUTPATIENT
Start: 2021-01-08 | End: 2021-01-08

## 2021-01-08 RX ORDER — MEMANTINE HYDROCHLORIDE 10 MG/1
5 TABLET ORAL
Refills: 0 | Status: DISCONTINUED | OUTPATIENT
Start: 2021-01-08 | End: 2021-01-08

## 2021-01-08 RX ORDER — OXYCODONE HYDROCHLORIDE 5 MG/1
1 TABLET ORAL
Qty: 30 | Refills: 0
Start: 2021-01-08 | End: 2021-01-12

## 2021-01-08 RX ORDER — MEMANTINE HYDROCHLORIDE 10 MG/1
14 TABLET ORAL DAILY
Refills: 0 | Status: DISCONTINUED | OUTPATIENT
Start: 2021-01-08 | End: 2021-01-08

## 2021-01-08 RX ADMIN — Medication 250 MILLIGRAM(S): at 03:22

## 2021-01-08 RX ADMIN — Medication 500 MILLIGRAM(S): at 05:54

## 2021-01-08 RX ADMIN — SODIUM CHLORIDE 100 MILLILITER(S): 9 INJECTION, SOLUTION INTRAVENOUS at 03:23

## 2021-01-08 RX ADMIN — Medication 81 MILLIGRAM(S): at 12:08

## 2021-01-08 RX ADMIN — MEMANTINE HYDROCHLORIDE 5 MILLIGRAM(S): 10 TABLET ORAL at 09:00

## 2021-01-08 RX ADMIN — PANTOPRAZOLE SODIUM 40 MILLIGRAM(S): 20 TABLET, DELAYED RELEASE ORAL at 05:54

## 2021-01-08 RX ADMIN — Medication 1 TABLET(S): at 12:08

## 2021-01-08 RX ADMIN — Medication 1 MILLIGRAM(S): at 12:08

## 2021-01-08 NOTE — OCCUPATIONAL THERAPY INITIAL EVALUATION ADULT - FINE MOTOR COORDINATION TRAINING, OT EVAL
Goal: Pt will independently manipulate buttons/zippers/fasteners on shirts/pants in 2 weeks to increase independence for ADL performance

## 2021-01-08 NOTE — OCCUPATIONAL THERAPY INITIAL EVALUATION ADULT - LIVES WITH, PROFILE
Per pt report, pt lives alone in 2 story condo duplex, 1 step to enter, bathroom on first floor (tub), 8+8 steps to ascend to 2nd floor (landing in between), walkin shower with grab bars 2nd floor/alone

## 2021-01-08 NOTE — PHYSICAL THERAPY INITIAL EVALUATION ADULT - TRANSFER TRAINING, PT EVAL
Pt will be able to transfer from all surfaces independently within 2 weeks to increase safety at home.

## 2021-01-08 NOTE — PHYSICAL THERAPY INITIAL EVALUATION ADULT - GENERAL OBSERVATIONS, REHAB EVAL
Pt received supine in bed, alert and cooperative with physical therapy. Pt received supine in bed, alert and cooperative with physical therapy, + sling, + IV

## 2021-01-08 NOTE — PHYSICAL THERAPY INITIAL EVALUATION ADULT - ADDITIONAL COMMENTS
Pt reports independence with ambulation and no use of assistive device. Pt reports independence with all transfer activities and ADLs. Patient lives alone in two level co-op with 1 TERRI.  To access second floor, 8 + 8 stsirs, L then R rail.  Patient was independent in all ADLs and ambulated without device.  Patient has tub and stall shower.

## 2021-01-08 NOTE — PHYSICAL THERAPY INITIAL EVALUATION ADULT - BED MOBILITY TRAINING, PT EVAL
Pt will be able to independently perform all bed mobility activities within 2 weeks to increase mobility at home.

## 2021-01-08 NOTE — OCCUPATIONAL THERAPY INITIAL EVALUATION ADULT - ADL RETRAINING, OT EVAL
Goal: Pt will perform modified UB dressing independently using compensatory dressing technique within 2 weeks.

## 2021-01-08 NOTE — PHYSICAL THERAPY INITIAL EVALUATION ADULT - TRANSFER SAFETY CONCERNS NOTED: SIT/STAND, REHAB EVAL
KHALIDA OATES WNL./decreased step length/inability to maintain weight-bearing restrictions w/o assist

## 2021-01-08 NOTE — OCCUPATIONAL THERAPY INITIAL EVALUATION ADULT - ANTICIPATED DISCHARGE DISPOSITION, OT EVAL
Home with Home Occupational Therapy to assess safety and functional mobility within home during self care tasks; Home with assist from family for all functional mobility and ADL/IADL performance/home w/ OT Home with Home Occupational Therapy to assess safety and functional mobility within home during self care tasks; Home with assist from family for all functional mobility and ADL/IADL performance; Pt reports niece will be staying with her upon discharge/home w/ OT

## 2021-01-08 NOTE — PHYSICAL THERAPY INITIAL EVALUATION ADULT - GAIT TRAINING, PT EVAL
Pt will be able to ambulate 200 ft without assistive device within 2 weeks to increase mobility at home.

## 2021-01-08 NOTE — PROGRESS NOTE ADULT - ASSESSMENT
-pain control  -f/u in 1-2 weeks  -sling  -d/c home
85 yr old female adm for r shoulder arthroplasty

## 2021-01-08 NOTE — PHYSICAL THERAPY INITIAL EVALUATION ADULT - PERTINENT HX OF CURRENT PROBLEM, REHAB EVAL
Patient is a 85 year old female who presents with a chief complaint of R shoulder pain, adm for R shoulder arthroplasty.

## 2021-01-08 NOTE — OCCUPATIONAL THERAPY INITIAL EVALUATION ADULT - GENERAL OBSERVATIONS, REHAB EVAL
Pt received semisupine in bed, A+Ox4, JEROME GAXIOLA in sling, IVF, tele monitor, bed alarm, intermittent confusion on date

## 2021-01-08 NOTE — PHYSICAL THERAPY INITIAL EVALUATION ADULT - LIVES WITH, PROFILE
Niece is staying home with the patient temporarily for 1-2 weeks./alone Niece will be staying with her post surgery/alone

## 2021-01-08 NOTE — PHYSICAL THERAPY INITIAL EVALUATION ADULT - DISCHARGE DISPOSITION, PT EVAL
home w/ home PT Patient requires assistance at home - niece will be staying with her/home w/ home PT

## 2021-01-08 NOTE — PROGRESS NOTE ADULT - SUBJECTIVE AND OBJECTIVE BOX
The patient was not seen due to COVID restrictions  85y Female s/p reverse right TSR with GA    T(C): 36.3 (01-08-21 @ 04:15), Max: 36.6 (01-07-21 @ 18:45)  HR: 58 (01-08-21 @ 04:15) (56 - 94)  BP: 107/63 (01-08-21 @ 04:15) (64/31 - 130/60)  RR: 18 (01-08-21 @ 04:15) (14 - 19)  SpO2: 98% (01-08-21 @ 04:15) (95% - 100%)  Wt(kg): --    Pt  doing well, no anesthesia complications or complaints noted or reported.   No Nausea    No additional recommendations.     Pain well controlled
Patient tolerated the procedure well. Patient seen and examined at bedside. No acute complaints at this time. Pain well controlled. Denies chest pain, shortness of breath, nausea or vomiting. Patient experienced large amount of blood loss intraoperative, was given 2 units while in the OR after an intraop H/H was low. Upon arrival to PACU patient was experiencing episodes of low BP in the 80s/40s. Patient was given a 500cc bolus of LR. BP in the 100s/50s. Medicine was consulted and made aware of the event. No other overnight events, patient doing will this AM says she feels great. BP holding.     PE:  Vital Signs Last 24 Hrs  T(C): 36.3 (08 Jan 2021 04:15), Max: 36.6 (07 Jan 2021 18:45)  T(F): 97.4 (08 Jan 2021 04:15), Max: 97.9 (07 Jan 2021 18:45)  HR: 58 (08 Jan 2021 04:15) (56 - 94)  BP: 107/63 (08 Jan 2021 04:15) (64/31 - 130/60)  BP(mean): --  RR: 18 (08 Jan 2021 04:15) (14 - 19)  SpO2: 98% (08 Jan 2021 04:15) (95% - 100%)    General: NAD, resting comfortably in bed  RUE:   Dressing C/D/I in sling  Compartments soft and compressible  No calf tenderness bilaterally  +Axillary/Musculocutaneous/Radial/Median/AIN/PIN intact  SILT C5-T1  2+ radial pulses          A/P:  85y f s/p Right rTSA POD 1  -PT/OT -NWB of the LUE  -Pain Control  -DVT ppx A81 once a day  -Continue perioperative abx x 24 hours  -FU AM Labs  -Rest, ice, compress and elevate the extremity as needed  -Incentive Spirometry  -Medical management appreciated  -Remote tele, monitor BPs  -Will monitor H/H and give blood as needed, FU AM H/H
Postop Check    Patient tolerated the procedure well. Patient seen and examined at bedside. No acute complaints at this time. Pain well controlled. Denies chest pain, shortness of breath, nausea or vomiting. Patient experienced large amount of blood loss intraoperative, was given 2 units while in the OR after an intraop H/H was low. Upon arrival to PACU patient was experiencing episodes of low BP in the 80s/40s. Patient was given a 500cc bolus of LR. BP in the 100s/50s. Medicine was consulted and made aware of the event.     PE:  Vital Signs Last 24 Hrs  T(C): 36.6 (01-07-21 @ 18:45), Max: 36.6 (01-07-21 @ 18:45)  T(F): 97.9 (01-07-21 @ 18:45), Max: 97.9 (01-07-21 @ 18:45)  HR: 83 (01-07-21 @ 19:55) (56 - 94)  BP: 91/46 (01-07-21 @ 19:30) (82/45 - 130/60)  BP(mean): --  RR: 15 (01-07-21 @ 19:55) (14 - 19)  SpO2: 100% (01-07-21 @ 19:55) (97% - 100%)    General: NAD, resting comfortably in bed  RUE:   Dressing C/D/I in sling  Compartments soft and compressible  No calf tenderness bilaterally  +Axillary/Musculocutaneous/Radial/Median/AIN/PIN intact  SILT C5-T1  2+ radial pulses                          9.7    17.50 )-----------( 124      ( 07 Jan 2021 19:33 )             28.6     07 Jan 2021 19:33    140    |  108    |  29     ----------------------------<  168    4.4     |  25     |  0.74     Ca    7.0        07 Jan 2021 19:33          A/P:  85y f s/p Right rTSA POD 0  -PT/OT -NWB of the LUE  -Pain Control  -DVT ppx A81 once a day  -Continue perioperative abx x 24 hours  -FU AM Labs  -Rest, ice, compress and elevate the extremity as needed  -Incentive Spirometry  -Medical management appreciated  -Remote tele, monitor BPs  -Will monitor H/H and give blood as needed, FU AM H/H
patient doing well, pain controlled    right shoulder incision c/d/i  +ain/pin/u  WWP
Patient is a 85y old  Female who presents with a chief complaint of right shoulder pain and instability    INTERVAL HPI/OVERNIGHT EVENTS:  T(C): 36.3 (01-08-21 @ 04:15), Max: 36.6 (01-07-21 @ 18:45)  HR: 72 (01-08-21 @ 12:18) (58 - 94)  BP: 121/65 (01-08-21 @ 12:18) (64/31 - 121/65)  RR: 18 (01-08-21 @ 04:15) (14 - 19)  SpO2: 95% (01-08-21 @ 12:18) (95% - 100%)  Wt(kg): --  I&O's Summary    07 Jan 2021 07:01  -  08 Jan 2021 07:00  --------------------------------------------------------  IN: 2261 mL / OUT: 150 mL / NET: 2111 mL        LABS:                        10.6   20.52 )-----------( 117      ( 08 Jan 2021 07:40 )             31.3     01-08    141  |  106  |  29<H>  ----------------------------<  130<H>  4.5   |  22  |  0.98    Ca    7.6<L>      08 Jan 2021 07:40          CAPILLARY BLOOD GLUCOSE                MEDICATIONS  (STANDING):  ascorbic acid 500 milliGRAM(s) Oral two times a day  aspirin enteric coated 81 milliGRAM(s) Oral daily  BUpivacaine liposome 1.3% Injectable 20 milliLiter(s) Local Injection once  donepezil 10 milliGRAM(s) Oral at bedtime  folic acid 1 milliGRAM(s) Oral daily  lactated ringers. 1000 milliLiter(s) (100 mL/Hr) IV Continuous <Continuous>  lactated ringers. 500 milliLiter(s) (999 mL/Hr) IV Continuous <Continuous>  memantine 5 milliGRAM(s) Oral two times a day  multivitamin 1 Tablet(s) Oral daily  pantoprazole    Tablet 40 milliGRAM(s) Oral before breakfast  polyethylene glycol 3350 17 Gram(s) Oral at bedtime  senna 2 Tablet(s) Oral at bedtime    MEDICATIONS  (PRN):  acetaminophen   Tablet .. 650 milliGRAM(s) Oral every 6 hours PRN Temp greater or equal to 38C (100.4F), Mild Pain (1 - 3)  benzocaine 15 mG/menthol 3.6 mG (Sugar-Free) Lozenge 1 Lozenge Oral every 2 hours PRN Sore Throat  melatonin 3 milliGRAM(s) Oral at bedtime PRN Sleep  morphine  - Injectable 1 milliGRAM(s) IV Push every 4 hours PRN Severe Pain (7 - 10)  ondansetron Injectable 4 milliGRAM(s) IV Push every 6 hours PRN Nausea and/or Vomiting  oxyCODONE    IR 5 milliGRAM(s) Oral every 4 hours PRN Moderate Pain (4 - 6)  oxyCODONE    IR 2.5 milliGRAM(s) Oral every 4 hours PRN Mild Pain (1 - 3)      REVIEW OF SYSTEMS:  CONSTITUTIONAL: No fever, weight loss, or fatigue  EYES: No eye pain, visual disturbances, or discharge  ENMT:  No difficulty hearing, tinnitus, vertigo; No sinus or throat pain  NECK: No pain or stiffness  RESPIRATORY: No cough, wheezing, chills or hemoptysis; No shortness of breath  CARDIOVASCULAR: No chest pain, palpitations, dizziness, or leg swelling  GASTROINTESTINAL: No abdominal or epigastric pain. No nausea, vomiting, or hematemesis; No diarrhea or constipation. No melena or hematochezia.  GENITOURINARY: No dysuria, frequency, hematuria, or incontinence  NEUROLOGICAL: No headaches, memory loss, loss of strength, numbness, or tremors  SKIN: No itching, burning, rashes, or lesions   LYMPH NODES: No enlarged glands  ENDOCRINE: No heat or cold intolerance; No hair loss  MUSCULOSKELETAL: No joint pain or swelling; No muscle, back, or extremity pain  PSYCHIATRIC: No depression, anxiety, mood swings, or difficulty sleeping  HEME/LYMPH: No easy bruising, or bleeding gums  ALLERY AND IMMUNOLOGIC: No hives or eczema    RADIOLOGY & ADDITIONAL TESTS:    Imaging Personally Reviewed:  [ ] YES  [ ] NO    Consultant(s) Notes Reviewed:  [ ] YES  [ ] NO    PHYSICAL EXAM:  GENERAL: NAD, well-groomed, well-developed  HEAD:  Atraumatic, Normocephalic  EYES: EOMI, PERRLA, conjunctiva and sclera clear  ENMT: No tonsillar erythema, exudates, or enlargement; Moist mucous membranes, Good dentition, No lesions  NECK: Supple, No JVD, Normal thyroid  NERVOUS SYSTEM:  Alert & Oriented X3, Good concentration; Motor Strength 5/5 B/L upper and lower extremities; DTRs 2+ intact and symmetric  CHEST/LUNG: Clear to percussion bilaterally; No rales, rhonchi, wheezing, or rubs  HEART: Regular rate and rhythm; No murmurs, rubs, or gallops  ABDOMEN: Soft, Nontender, Nondistended; Bowel sounds present  EXTREMITIES:  2+ Peripheral Pulses, No clubbing, cyanosis, or edema  LYMPH: No lymphadenopathy noted  SKIN: No rashes or lesions    Care Discussed with Consultants/Other Providers [ ] YES  [ ] NO

## 2021-01-08 NOTE — PHYSICAL THERAPY INITIAL EVALUATION ADULT - WEIGHT-BEARING RESTRICTIONS: GAIT, REHAB EVAL
JEROME GAXIOLA. No restrictions in BLE./full weight-bearing JEROME GAXIOLA. No restrictions in BLE./nonweight-bearing

## 2021-01-08 NOTE — PHYSICAL THERAPY INITIAL EVALUATION ADULT - BALANCE TRAINING, PT EVAL
Pt will be able to independently perform all balance activities within 2 weeks to increase safety at home.

## 2021-01-08 NOTE — OCCUPATIONAL THERAPY INITIAL EVALUATION ADULT - PERTINENT HX OF CURRENT PROBLEM, REHAB EVAL
84yo F s/p elective reverse total shoulder Arthroplasty to the right shoulder after failing outpatient nonoperative conservative management. The patient presented to [facility] after being medically cleared for an elective surgical procedure.

## 2021-01-08 NOTE — PHYSICAL THERAPY INITIAL EVALUATION ADULT - CRITERIA FOR SKILLED THERAPEUTIC INTERVENTIONS
impairments found/rehab potential/therapy frequency impairments found/functional limitations in following categories/rehab potential/therapy frequency

## 2021-01-11 ENCOUNTER — NON-APPOINTMENT (OUTPATIENT)
Age: 86
End: 2021-01-11

## 2021-01-11 DIAGNOSIS — Z96.611 PRESENCE OF RIGHT ARTIFICIAL SHOULDER JOINT: ICD-10-CM

## 2021-01-12 ENCOUNTER — NON-APPOINTMENT (OUTPATIENT)
Age: 86
End: 2021-01-12

## 2021-01-20 PROCEDURE — 76000 FLUOROSCOPY <1 HR PHYS/QHP: CPT

## 2021-01-20 PROCEDURE — 86901 BLOOD TYPING SEROLOGIC RH(D): CPT

## 2021-01-20 PROCEDURE — C1713: CPT

## 2021-01-20 PROCEDURE — C1776: CPT

## 2021-01-20 PROCEDURE — 86923 COMPATIBILITY TEST ELECTRIC: CPT

## 2021-01-20 PROCEDURE — P9016: CPT

## 2021-01-20 PROCEDURE — 97161 PT EVAL LOW COMPLEX 20 MIN: CPT

## 2021-01-20 PROCEDURE — 99285 EMERGENCY DEPT VISIT HI MDM: CPT

## 2021-01-20 PROCEDURE — 36415 COLL VENOUS BLD VENIPUNCTURE: CPT

## 2021-01-20 PROCEDURE — 86900 BLOOD TYPING SEROLOGIC ABO: CPT

## 2021-01-20 PROCEDURE — C1889: CPT

## 2021-01-20 PROCEDURE — 80048 BASIC METABOLIC PNL TOTAL CA: CPT

## 2021-01-20 PROCEDURE — 36430 TRANSFUSION BLD/BLD COMPNT: CPT

## 2021-01-20 PROCEDURE — 86850 RBC ANTIBODY SCREEN: CPT

## 2021-01-20 PROCEDURE — 97165 OT EVAL LOW COMPLEX 30 MIN: CPT

## 2021-01-20 PROCEDURE — 85027 COMPLETE CBC AUTOMATED: CPT

## 2021-02-08 ENCOUNTER — RX RENEWAL (OUTPATIENT)
Age: 86
End: 2021-02-08

## 2021-03-15 ENCOUNTER — RX RENEWAL (OUTPATIENT)
Age: 86
End: 2021-03-15

## 2021-03-16 ENCOUNTER — APPOINTMENT (OUTPATIENT)
Dept: NEUROLOGY | Facility: CLINIC | Age: 86
End: 2021-03-16
Payer: MEDICARE

## 2021-03-16 VITALS
WEIGHT: 95 LBS | SYSTOLIC BLOOD PRESSURE: 121 MMHG | BODY MASS INDEX: 21.98 KG/M2 | HEART RATE: 73 BPM | HEIGHT: 55 IN | TEMPERATURE: 97.2 F | DIASTOLIC BLOOD PRESSURE: 74 MMHG

## 2021-03-16 PROCEDURE — 99214 OFFICE O/P EST MOD 30 MIN: CPT

## 2021-03-16 NOTE — HISTORY OF PRESENT ILLNESS
[FreeTextEntry1] : Patient is here for follow, last seen on 9/11/20, Is accompanied by her stepdaughter Kamille. Pt reports she is well, has no complaints, lives alone, her stepdaughter makes arrangements for food to be delivered  to her, she does not drive, as per daughter, pts memory has declines but she is functional .\par \par As per daughter she had a fall in January that resulted in right shoulder injury, patient underwent right shoulder surgery, followed by physical therapy and has made remarkable recovery. she is able to move her arm and has minimal restriction of shoulder elevation, she has no pain.\par \par Pt is on donepezil, memantine ER was increased to 14 mgs,  she has been taking the medicine regularly, has not had any adverse effects, has not noted any significant improvement. \par  \par

## 2021-03-16 NOTE — PHYSICAL EXAM
[General Appearance - Alert] : alert [General Appearance - In No Acute Distress] : in no acute distress [Mood] : the mood was normal [Person] : oriented to person [Place] : oriented to place [Time] : disoriented to time [Registration Intact] : recent registration memory intact [Concentration Intact] : normal concentrating ability [Naming Objects] : no difficulty naming common objects [Repeating Phrases] : no difficulty repeating a phrase [Fluency] : fluency intact [Comprehension] : comprehension intact [Past History] : adequate knowledge of personal past history [Cranial Nerves Optic (II)] : visual acuity intact bilaterally,  visual fields full to confrontation, pupils equal round and reactive to light [Cranial Nerves Oculomotor (III)] : extraocular motion intact [Cranial Nerves Trigeminal (V)] : facial sensation intact symmetrically [Cranial Nerves Facial (VII)] : face symmetrical [Cranial Nerves Vestibulocochlear (VIII)] : hearing was intact bilaterally [Cranial Nerves Glossopharyngeal (IX)] : tongue and palate midline [Cranial Nerves Accessory (XI - Cranial And Spinal)] : head turning and shoulder shrug symmetric [Cranial Nerves Hypoglossal (XII)] : there was no tongue deviation with protrusion [Motor Tone] : muscle tone was normal in all four extremities [Motor Strength] : muscle strength was normal in all four extremities [No Muscle Atrophy] : normal bulk in all four extremities [Sensation Tactile Decrease] : light touch was intact [Romberg's Sign] : Romberg's sign was negtive [Abnormal Walk] : normal gait [Balance] : balance was intact [Past-pointing] : there was no past-pointing [Tremor] : no tremor present [2+] : Patella left 2+ [1+] : Ankle jerk left 1+ [Plantar Reflex Right Only] : normal on the right [Plantar Reflex Left Only] : normal on the left [FreeTextEntry4] : Cannot recall Presidents' name [FreeTextEntry6] : Right shoulder elevation slightly restricted [Edema] : there was no peripheral edema

## 2021-03-16 NOTE — DISCUSSION/SUMMARY
[FreeTextEntry1] : 85-year-old RH F with PMHx of ILD, MDS, OA/OP, with mildly evolving cognitive/memory problems.  Cognitive test ~ Global score 93.2, more than one SD below average in Memory, verbal func; below average in 3 domains.\par \par # Dementia, senile, mildly progressive; repeat cognitive test in 3/49506 reveals Global score 83.7; significant drop in score ~ 10 points compared with previous test of 5/15/18\par \par - continue donepezil 10 mg daily.\par - Plan to Increase Memantine ER to 21 mg next refill\par - Advised to perform cognitive exercises, stay engaged in social activities, advised regarding safety as she lives alone.\par - Pt advised not to drive\par - Above discussed with Kamille laguerre' step-daughter

## 2021-03-16 NOTE — DATA REVIEWED
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\par \par \par  [de-identified] : 3/9/20: Cognitive assessment\par Global cognitive score: 83.7\par More than one standard deviation below average: Memory 75, motor skills 83.8 verbal function \par Below average in domains:  attention 87.3, executive function 85.6,  information processing speed 86.6, \par Above average in domains: None\par 5/15/18: Cognitive assessment: Global battery\par Global cognitive score: 93.2\par More than one standard deviation below average: Memory 81.6,  verbal function 72.9\par Below average in domains:  attention 91.8, executive function 91,  information processing speed 96.2, \par Above average in domains:visuospatial function 109.8,  motor skills 108.8 [de-identified] : 2/1/18; B12, TSH normal\par Rest of labs reviewed

## 2021-05-20 ENCOUNTER — RX RENEWAL (OUTPATIENT)
Age: 86
End: 2021-05-20

## 2021-06-08 ENCOUNTER — APPOINTMENT (OUTPATIENT)
Dept: INTERNAL MEDICINE | Facility: CLINIC | Age: 86
End: 2021-06-08

## 2021-06-10 ENCOUNTER — APPOINTMENT (OUTPATIENT)
Dept: INTERNAL MEDICINE | Facility: CLINIC | Age: 86
End: 2021-06-10
Payer: MEDICARE

## 2021-06-10 VITALS
SYSTOLIC BLOOD PRESSURE: 120 MMHG | WEIGHT: 94 LBS | TEMPERATURE: 97.3 F | OXYGEN SATURATION: 97 % | DIASTOLIC BLOOD PRESSURE: 60 MMHG | HEART RATE: 61 BPM | HEIGHT: 57 IN | BODY MASS INDEX: 20.28 KG/M2

## 2021-06-10 DIAGNOSIS — Z23 ENCOUNTER FOR IMMUNIZATION: ICD-10-CM

## 2021-06-10 PROCEDURE — 99214 OFFICE O/P EST MOD 30 MIN: CPT | Mod: 25

## 2021-06-10 PROCEDURE — 94618 PULMONARY STRESS TESTING: CPT

## 2021-06-10 PROCEDURE — 94010 BREATHING CAPACITY TEST: CPT

## 2021-06-10 PROCEDURE — 94729 DIFFUSING CAPACITY: CPT

## 2021-06-10 PROCEDURE — 94726 PLETHYSMOGRAPHY LUNG VOLUMES: CPT

## 2021-06-10 RX ORDER — OXYCODONE 5 MG/1
5 TABLET ORAL
Refills: 0 | Status: DISCONTINUED | COMMUNITY
Start: 2021-01-11 | End: 2021-06-10

## 2021-06-10 NOTE — REVIEW OF SYSTEMS
[Recent Wt Loss (___ Lbs)] : recent [unfilled] ~Ulb weight loss [As Noted in HPI] : as noted in HPI [Dyspnea] : dyspnea [Trauma] : ~T physical trauma [Memory Loss] : ~T memory lapses or loss [Negative] : Sleep Disorder

## 2021-08-16 ENCOUNTER — RX RENEWAL (OUTPATIENT)
Age: 86
End: 2021-08-16

## 2021-08-19 NOTE — OCCUPATIONAL THERAPY INITIAL EVALUATION ADULT - DIAGNOSIS, OT EVAL
no
Pt currently presents with decreased balance, ROM RUE and strength limiting independence with ADLs and functional mobility.

## 2021-08-23 ENCOUNTER — RX RENEWAL (OUTPATIENT)
Age: 86
End: 2021-08-23

## 2021-09-17 ENCOUNTER — APPOINTMENT (OUTPATIENT)
Dept: NEUROLOGY | Facility: CLINIC | Age: 86
End: 2021-09-17

## 2021-10-05 ENCOUNTER — APPOINTMENT (OUTPATIENT)
Dept: NEUROLOGY | Facility: CLINIC | Age: 86
End: 2021-10-05
Payer: MEDICARE

## 2021-10-05 VITALS
WEIGHT: 95 LBS | DIASTOLIC BLOOD PRESSURE: 71 MMHG | TEMPERATURE: 97.8 F | HEART RATE: 98 BPM | SYSTOLIC BLOOD PRESSURE: 110 MMHG | BODY MASS INDEX: 20.49 KG/M2 | HEIGHT: 57 IN

## 2021-10-05 DIAGNOSIS — F39 UNSPECIFIED MOOD [AFFECTIVE] DISORDER: ICD-10-CM

## 2021-10-05 PROCEDURE — 99214 OFFICE O/P EST MOD 30 MIN: CPT

## 2021-10-05 RX ORDER — FUROSEMIDE 20 MG/1
20 TABLET ORAL
Refills: 0 | Status: ACTIVE | COMMUNITY

## 2021-10-05 RX ORDER — MULTIVIT-MIN/FOLIC/VIT K/LYCOP 400-300MCG
25 MCG TABLET ORAL
Refills: 0 | Status: ACTIVE | COMMUNITY

## 2021-10-05 RX ORDER — CHLORHEXIDINE GLUCONATE 4 %
400 LIQUID (ML) TOPICAL
Refills: 0 | Status: ACTIVE | COMMUNITY

## 2021-10-05 NOTE — DATA REVIEWED
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\par \par \par  [de-identified] : 3/9/20: Cognitive assessment\par Global cognitive score: 83.7\par More than one standard deviation below average: Memory 75, motor skills 83.8 verbal function \par Below average in domains:  attention 87.3, executive function 85.6,  information processing speed 86.6, \par Above average in domains: None\par 5/15/18: Cognitive assessment: Global battery\par Global cognitive score: 93.2\par More than one standard deviation below average: Memory 81.6,  verbal function 72.9\par Below average in domains:  attention 91.8, executive function 91,  information processing speed 96.2, \par Above average in domains:visuospatial function 109.8,  motor skills 108.8 [de-identified] : 2/1/18; B12, TSH normal\par Rest of labs reviewed

## 2021-10-05 NOTE — REVIEW OF SYSTEMS
[Memory Lapses or Loss] : memory loss [Negative] : Heme/Lymph [Feeling Tired] : feeling tired [Decr. Concentrating Ability] : decreased concentrating ability [As Noted in HPI] : as noted in HPI [Lower Ext Edema] : lower extremity edema

## 2021-10-05 NOTE — HISTORY OF PRESENT ILLNESS
[FreeTextEntry1] : Patient is here for follow-up, last seen on 3/6/21, is accompanied by her relative Myriam - stepdaughter Kamille is away. Pt has no complaints, her relative Myriam reports that since past 3 weeks there is significant change in her condition, she is eating very poorly, is tires, sleeping a lot, white eating breakfast she falls asleep, previously Was active, playing games on her computer. She  lives alone, her stepdaughter makes arrangements for food to be delivered  to her, she does not drive, pts memory has declines slightly as well, she is on donepezil, memantine ER was increased to 14 mgs, she has been taking the medicine regularly, has not had any adverse effects or any significant improvement. \par  \par Patient has followed up with Dr. Rodriguez, she has been noted to be anemic and have low ferritin, she was administered B12 injection.\par \par Patient is on furosemide, was prescribed by her cardiologist for lower extremity edema\par  \par

## 2021-10-05 NOTE — PHYSICAL EXAM
[General Appearance - Alert] : alert [General Appearance - In No Acute Distress] : in no acute distress [Mood] : the mood was normal [Person] : oriented to person [Place] : oriented to place [Registration Intact] : recent registration memory intact [Naming Objects] : no difficulty naming common objects [Repeating Phrases] : no difficulty repeating a phrase [Fluency] : fluency intact [Comprehension] : comprehension intact [Past History] : adequate knowledge of personal past history [Cranial Nerves Optic (II)] : visual acuity intact bilaterally,  visual fields full to confrontation, pupils equal round and reactive to light [Cranial Nerves Oculomotor (III)] : extraocular motion intact [Cranial Nerves Trigeminal (V)] : facial sensation intact symmetrically [Cranial Nerves Facial (VII)] : face symmetrical [Cranial Nerves Vestibulocochlear (VIII)] : hearing was intact bilaterally [Cranial Nerves Glossopharyngeal (IX)] : tongue and palate midline [Cranial Nerves Accessory (XI - Cranial And Spinal)] : head turning and shoulder shrug symmetric [Cranial Nerves Hypoglossal (XII)] : there was no tongue deviation with protrusion [Motor Tone] : muscle tone was normal in all four extremities [Motor Strength] : muscle strength was normal in all four extremities [No Muscle Atrophy] : normal bulk in all four extremities [Sensation Tactile Decrease] : light touch was intact [Abnormal Walk] : normal gait [2+] : Patella left 2+ [1+] : Ankle jerk left 1+ [Edema] : there was no peripheral edema [Time] : disoriented to time [Concentration Intact] : a decrease in concentrating ability was observed [Current Events] : inadequate knowledge of current events [Romberg's Sign] : Romberg's sign was negtive [Past-pointing] : there was no past-pointing [Tremor] : no tremor present [Plantar Reflex Right Only] : normal on the right [Plantar Reflex Left Only] : normal on the left [FreeTextEntry4] : Cannot recall Presidents' name [FreeTextEntry6] : Right shoulder elevation slightly restricted [PERRL With Normal Accommodation] : pupils were equal in size, round, reactive to light, with normal accommodation [Extraocular Movements] : extraocular movements were intact [Full Visual Field] : full visual field [] : the neck was supple [Neck Cervical Mass (___cm)] : no neck mass was observed

## 2021-10-05 NOTE — DISCUSSION/SUMMARY
[FreeTextEntry1] : 86-year-old RH F with PMHx of ILD, MDS, OA/OP, mildly evolving cognitive/memory problems.  Cognitive test ~ Global score 93.2, > 1 SD below average in Memory, verbal func; below average in 3 domains.\par \par # Dementia, senile, mildly progressive; repeat cognitive test in 3/34072 reveals Global score 83.7; significant drop in score ~ 10 points compared with previous test of 5/15/18\par \par - continue donepezil 10 mg daily.\par - Plan to Increase Memantine ER to 21 mg next refill\par \par # Lethargy / mild apathy - mood changes of acute onset; rule out metabolic syndrome / infection\par \par - Recommended followup with PMD and cardiology to rule out infection/metabolic changes/hypokal\par \par - We will start low dose sertraline 25 mg daily adverse effects discussed with patient's relatives Puja\par - cognitive exercises, stay engaged in social activities, advised regarding safety as she lives alone.\par - Above discussed with Myriam - pts relative, she will update Kamille pts' step-daughter

## 2021-10-07 ENCOUNTER — OUTPATIENT (OUTPATIENT)
Dept: OUTPATIENT SERVICES | Facility: HOSPITAL | Age: 86
LOS: 1 days | End: 2021-10-07
Payer: MEDICARE

## 2021-10-07 DIAGNOSIS — Z90.710 ACQUIRED ABSENCE OF BOTH CERVIX AND UTERUS: Chronic | ICD-10-CM

## 2021-10-07 DIAGNOSIS — Z96.60 PRESENCE OF UNSPECIFIED ORTHOPEDIC JOINT IMPLANT: Chronic | ICD-10-CM

## 2021-10-07 DIAGNOSIS — Z98.51 TUBAL LIGATION STATUS: Chronic | ICD-10-CM

## 2021-10-07 DIAGNOSIS — Z41.9 ENCOUNTER FOR PROCEDURE FOR PURPOSES OTHER THAN REMEDYING HEALTH STATE, UNSPECIFIED: Chronic | ICD-10-CM

## 2021-10-07 DIAGNOSIS — Z20.828 CONTACT WITH AND (SUSPECTED) EXPOSURE TO OTHER VIRAL COMMUNICABLE DISEASES: ICD-10-CM

## 2021-10-07 DIAGNOSIS — D46.9 MYELODYSPLASTIC SYNDROME, UNSPECIFIED: ICD-10-CM

## 2021-10-07 LAB — SARS-COV-2 RNA SPEC QL NAA+PROBE: SIGNIFICANT CHANGE UP

## 2021-10-07 PROCEDURE — U0005: CPT

## 2021-10-07 PROCEDURE — U0003: CPT

## 2021-10-07 PROCEDURE — 36415 COLL VENOUS BLD VENIPUNCTURE: CPT

## 2021-10-07 PROCEDURE — C9803: CPT

## 2021-10-07 PROCEDURE — 86923 COMPATIBILITY TEST ELECTRIC: CPT

## 2021-10-07 PROCEDURE — 86900 BLOOD TYPING SEROLOGIC ABO: CPT

## 2021-10-07 PROCEDURE — 86901 BLOOD TYPING SEROLOGIC RH(D): CPT

## 2021-10-07 PROCEDURE — 86850 RBC ANTIBODY SCREEN: CPT

## 2021-10-08 ENCOUNTER — OUTPATIENT (OUTPATIENT)
Dept: OUTPATIENT SERVICES | Facility: HOSPITAL | Age: 86
LOS: 1 days | End: 2021-10-08
Payer: MEDICARE

## 2021-10-08 VITALS
SYSTOLIC BLOOD PRESSURE: 108 MMHG | TEMPERATURE: 98 F | RESPIRATION RATE: 18 BRPM | HEART RATE: 73 BPM | OXYGEN SATURATION: 97 % | DIASTOLIC BLOOD PRESSURE: 51 MMHG

## 2021-10-08 VITALS
SYSTOLIC BLOOD PRESSURE: 109 MMHG | RESPIRATION RATE: 18 BRPM | WEIGHT: 94.8 LBS | HEIGHT: 57 IN | TEMPERATURE: 98 F | DIASTOLIC BLOOD PRESSURE: 54 MMHG | OXYGEN SATURATION: 97 % | HEART RATE: 81 BPM

## 2021-10-08 DIAGNOSIS — Z96.60 PRESENCE OF UNSPECIFIED ORTHOPEDIC JOINT IMPLANT: Chronic | ICD-10-CM

## 2021-10-08 DIAGNOSIS — D46.9 MYELODYSPLASTIC SYNDROME, UNSPECIFIED: ICD-10-CM

## 2021-10-08 DIAGNOSIS — Z20.828 CONTACT WITH AND (SUSPECTED) EXPOSURE TO OTHER VIRAL COMMUNICABLE DISEASES: ICD-10-CM

## 2021-10-08 DIAGNOSIS — Z41.9 ENCOUNTER FOR PROCEDURE FOR PURPOSES OTHER THAN REMEDYING HEALTH STATE, UNSPECIFIED: Chronic | ICD-10-CM

## 2021-10-08 DIAGNOSIS — Z98.51 TUBAL LIGATION STATUS: Chronic | ICD-10-CM

## 2021-10-08 DIAGNOSIS — Z90.710 ACQUIRED ABSENCE OF BOTH CERVIX AND UTERUS: Chronic | ICD-10-CM

## 2021-10-08 PROCEDURE — 36430 TRANSFUSION BLD/BLD COMPNT: CPT

## 2021-10-08 PROCEDURE — P9016: CPT

## 2021-10-08 NOTE — INFUSION NURSING HISTORY - TEACHING/LEARNING FACTORS IMPACT ABILITY TO LEARN
hx alzheimers, forgetful/cognitive limitations/hearing problems/physical limitations/visual problems

## 2021-10-12 ENCOUNTER — NON-APPOINTMENT (OUTPATIENT)
Age: 86
End: 2021-10-12

## 2021-10-29 NOTE — ASSESSMENT
[FreeTextEntry1] : ILD/HSP\par Clinically stable\par PFT's remain stable as well\par \par Will monitor CT chest - wishes to do f/u in 2021 = RX given\par PFT's and 6 minute walk with next visit\par Flu vaccine given 2020\par Had covid vaccination\par No longer smokes\par MN\par Continue oxygen with exercise and with sleep at 2 LPM via nasal cannula\par To call for any pulmonary issues\par Wishes to RTC in 6 months and as needed

## 2021-10-29 NOTE — PHYSICAL EXAM
[General Appearance - Well Developed] : well developed [Well Groomed] : well groomed [General Appearance - Well Nourished] : well nourished [General Appearance - In No Acute Distress] : no acute distress [Jugular Venous Distention Increased] : there was no jugular-venous distention [Heart Rate And Rhythm] : heart rate and rhythm were normal [Heart Sounds] : normal S1 and S2 [Edema] : no peripheral edema present [] : no respiratory distress [Respiration, Rhythm And Depth] : normal respiratory rhythm and effort [Exaggerated Use Of Accessory Muscles For Inspiration] : no accessory muscle use [Bowel Sounds] : normal bowel sounds [Abdomen Soft] : soft [Abnormal Walk] : normal gait [Skin Color & Pigmentation] : normal skin color and pigmentation [Oriented To Time, Place, And Person] : oriented to person, place, and time [Affect] : the affect was normal [Nail Clubbing] : no clubbing of the fingernails [Cyanosis, Localized] : no localized cyanosis [FreeTextEntry1] : \rod In 1991, 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 mass lesion. Underwent VATS with open lung biopsy by Dr. Herman at [facility] [place]. 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

## 2021-10-29 NOTE — ADDENDUM
[FreeTextEntry1] : Oxygen saturation at rest on room air =97%\par Oxygen saturation while walking on room air =87%\par Oxygen saturation with oxygen 2 L/min via nasal cannula walking =93%

## 2021-10-29 NOTE — HISTORY OF PRESENT ILLNESS
[Former] : former [Never] : never [Cough] : coughing [Wheezing] : wheezing [Nasal Passage Blockage (Stuffiness)] : edema [Nonspecific Pain, Swelling, And Stiffness] : chest pain [Fever] : fever [Difficulty Breathing During Exertion] : dyspnea on exertion [Feelings Of Weakness On Exertion] : exercise intolerance [Intermittent] : Intermittent [NC] : Nasal Cannula [Nightly] : Nightly [Designated Healthcare Proxy] : Designated healthcare proxy [Relationship: ___] : Relationship: [unfilled] [None] : ~He/She~ has no significant interval events [URI] : upper respiratory tract infection [Exercise] : exercise [Does not check] : The patient is not checking peak flow at home [2  -  Slight] : 2, slight [Class II - Mild Symptoms and Slight Limitations] : II [Reviewed no changes] : Reviewed no changes [Wt Gain ___ kg] : No recent weight gain [Wt Loss ___ kg] : No recent weight loss [TextBox_4] : Comes in for routine pulmonary follow-up for interstitial lung disease.   Brought in by daughter.  Having issues with progressive dementia. \par She denies any new respiratory complaints.  She wears her oxygen at bedtime with sleep. [FreeTextEntry1] : 2 [ESS] : 0 [TextBox_42] : 2017 [AdvancecareDate] : 06/21 [More Frequent Use Needed Recently] : Patient reports no recent increase in frequency of

## 2021-11-29 ENCOUNTER — RX RENEWAL (OUTPATIENT)
Age: 86
End: 2021-11-29

## 2021-12-13 ENCOUNTER — APPOINTMENT (OUTPATIENT)
Dept: INTERNAL MEDICINE | Facility: CLINIC | Age: 86
End: 2021-12-13
Payer: MEDICARE

## 2021-12-13 VITALS
BODY MASS INDEX: 21.36 KG/M2 | DIASTOLIC BLOOD PRESSURE: 62 MMHG | WEIGHT: 99 LBS | TEMPERATURE: 96.3 F | HEIGHT: 57 IN | HEART RATE: 106 BPM | OXYGEN SATURATION: 99 % | SYSTOLIC BLOOD PRESSURE: 120 MMHG

## 2021-12-13 DIAGNOSIS — Z23 ENCOUNTER FOR IMMUNIZATION: ICD-10-CM

## 2021-12-13 PROCEDURE — G0008: CPT

## 2021-12-13 PROCEDURE — 99214 OFFICE O/P EST MOD 30 MIN: CPT | Mod: 25

## 2021-12-13 PROCEDURE — 90662 IIV NO PRSV INCREASED AG IM: CPT

## 2021-12-13 RX ORDER — CLOBETASOL PROPIONATE 0.5 MG/G
0.05 OINTMENT TOPICAL
Qty: 45 | Refills: 0 | Status: DISCONTINUED | COMMUNITY
Start: 2016-11-08 | End: 2021-12-13

## 2021-12-13 RX ORDER — TRIAMCINOLONE ACETONIDE 1 MG/G
0.1 OINTMENT TOPICAL
Qty: 80 | Refills: 0 | Status: DISCONTINUED | COMMUNITY
Start: 2018-04-17 | End: 2021-12-13

## 2021-12-13 NOTE — ASSESSMENT
[FreeTextEntry1] : ILD/HSP\par Clinically stable\par \par Will monitor CT chest - last done 11/2021\par PFT's and 6 minute walk with next visit = not sure if she will be able to cooperate\par Flu vaccine given 2021\par Had covid vaccinations\par No longer smokes\par SD declined\par Continue oxygen with exercise and with sleep at 2 LPM via nasal cannula as much as possible (patient non-cooperative due to dementia)\par To call for any pulmonary issues\par Wishes to RTC in 6 months and as needed\par All of the above was discussed in detail with the patient and Puja and all of their questions were answered\par They verbally confirmed understanding of all of the above and agreement with the above plan

## 2021-12-13 NOTE — HISTORY OF PRESENT ILLNESS
[Former] : former [Never] : never [Cough] : coughing [Wheezing] : wheezing [Fever] : fever [Nonspecific Pain, Swelling, And Stiffness] : chest pain [Nasal Passage Blockage (Stuffiness)] : edema [Difficulty Breathing During Exertion] : dyspnea on exertion [Feelings Of Weakness On Exertion] : exercise intolerance [Wt Gain ___ kg] : No recent weight gain [Wt Loss ___ kg] : No recent weight loss [Intermittent] : Intermittent [NC] : Nasal Cannula [Nightly] : Nightly [Does not check] : The patient is not checking peak flow at home [2  -  Slight] : 2, slight [Class II - Mild Symptoms and Slight Limitations] : II [TextBox_4] : Comes in for routine pulmonary follow-up for interstitial lung disease.   Brought in by daughter = Puja.  Having issues with progressive dementia and depression. Meds reviewed.\par She denies any new respiratory complaints.\par Edema improved with Lasix.  She denies any calf pain.  She denies any orthopnea or PND.  Her weight remains stable.  She denies any fevers or chills.  She denies any chest pain.  She denies any significant cough.  She denies any hemoptysis.  She denies any increased shortness of breath.  She does have dyspnea on exertion with stairs.\par She is getting Procrit injections every 3 weeks to keep her hemoglobin stable.  She did recently receive a blood transfusion.\par I reviewed with Puja the patient's pulmonary history.  I also reviewed the testing done in the past which qualified her for home oxygen for use with sleep and activity.  She reports that the patient is presently not cooperative with home oxygen therapy. [FreeTextEntry1] : 2 [ESS] : 0 [TextBox_42] : 11/21 [Reviewed no changes] : Reviewed no changes [Designated Healthcare Proxy] : Designated healthcare proxy [Relationship: ___] : Relationship: [unfilled] [AdvancecareDate] : 12/21 [More Frequent Use Needed Recently] : Patient reports no recent increase in frequency of

## 2021-12-13 NOTE — REVIEW OF SYSTEMS
[As Noted in HPI] : as noted in HPI [Dyspnea] : dyspnea [Trauma] : ~T physical trauma [Memory Loss] : ~T memory lapses or loss [Negative] : Sleep Disorder

## 2021-12-13 NOTE — PHYSICAL EXAM
[General Appearance - Well Developed] : well developed [Well Groomed] : well groomed [General Appearance - Well Nourished] : well nourished [General Appearance - In No Acute Distress] : no acute distress [Jugular Venous Distention Increased] : there was no jugular-venous distention [Heart Rate And Rhythm] : heart rate and rhythm were normal [Heart Sounds] : normal S1 and S2 [Edema] : no peripheral edema present [] : no respiratory distress [Respiration, Rhythm And Depth] : normal respiratory rhythm and effort [Exaggerated Use Of Accessory Muscles For Inspiration] : no accessory muscle use [Bowel Sounds] : normal bowel sounds [Abdomen Soft] : soft [Abnormal Walk] : normal gait [Skin Color & Pigmentation] : normal skin color and pigmentation [Oriented To Time, Place, And Person] : oriented to person, place, and time [Affect] : the affect was normal [FreeTextEntry1] : \rod In 1991, 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 mass lesion. Underwent VATS with open lung biopsy by Dr. Herman at [facility] [place]. 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 [Nail Clubbing] : no clubbing of the fingernails [Cyanosis, Localized] : no localized cyanosis

## 2021-12-25 ENCOUNTER — EMERGENCY (EMERGENCY)
Facility: HOSPITAL | Age: 86
LOS: 1 days | Discharge: HOME | End: 2021-12-25
Attending: EMERGENCY MEDICINE | Admitting: EMERGENCY MEDICINE
Payer: MEDICARE

## 2021-12-25 VITALS
HEART RATE: 88 BPM | SYSTOLIC BLOOD PRESSURE: 135 MMHG | WEIGHT: 97 LBS | OXYGEN SATURATION: 96 % | DIASTOLIC BLOOD PRESSURE: 80 MMHG | RESPIRATION RATE: 15 BRPM | HEIGHT: 57 IN | TEMPERATURE: 99 F

## 2021-12-25 VITALS
OXYGEN SATURATION: 95 % | RESPIRATION RATE: 16 BRPM | SYSTOLIC BLOOD PRESSURE: 117 MMHG | TEMPERATURE: 98 F | DIASTOLIC BLOOD PRESSURE: 73 MMHG | HEART RATE: 98 BPM

## 2021-12-25 DIAGNOSIS — Z96.60 PRESENCE OF UNSPECIFIED ORTHOPEDIC JOINT IMPLANT: Chronic | ICD-10-CM

## 2021-12-25 DIAGNOSIS — Z98.51 TUBAL LIGATION STATUS: Chronic | ICD-10-CM

## 2021-12-25 DIAGNOSIS — Z90.710 ACQUIRED ABSENCE OF BOTH CERVIX AND UTERUS: Chronic | ICD-10-CM

## 2021-12-25 DIAGNOSIS — Z41.9 ENCOUNTER FOR PROCEDURE FOR PURPOSES OTHER THAN REMEDYING HEALTH STATE, UNSPECIFIED: Chronic | ICD-10-CM

## 2021-12-25 PROCEDURE — 90471 IMMUNIZATION ADMIN: CPT

## 2021-12-25 PROCEDURE — 72125 CT NECK SPINE W/O DYE: CPT | Mod: MA

## 2021-12-25 PROCEDURE — 99284 EMERGENCY DEPT VISIT MOD MDM: CPT

## 2021-12-25 PROCEDURE — 99284 EMERGENCY DEPT VISIT MOD MDM: CPT | Mod: 25

## 2021-12-25 PROCEDURE — 72125 CT NECK SPINE W/O DYE: CPT | Mod: 26,MA

## 2021-12-25 PROCEDURE — 70450 CT HEAD/BRAIN W/O DYE: CPT | Mod: 26,MA

## 2021-12-25 PROCEDURE — 90715 TDAP VACCINE 7 YRS/> IM: CPT

## 2021-12-25 PROCEDURE — 70450 CT HEAD/BRAIN W/O DYE: CPT | Mod: MA

## 2021-12-25 RX ORDER — TETANUS TOXOID, REDUCED DIPHTHERIA TOXOID AND ACELLULAR PERTUSSIS VACCINE, ADSORBED 5; 2.5; 8; 8; 2.5 [IU]/.5ML; [IU]/.5ML; UG/.5ML; UG/.5ML; UG/.5ML
0.5 SUSPENSION INTRAMUSCULAR ONCE
Refills: 0 | Status: COMPLETED | OUTPATIENT
Start: 2021-12-25 | End: 2021-12-25

## 2021-12-25 RX ADMIN — TETANUS TOXOID, REDUCED DIPHTHERIA TOXOID AND ACELLULAR PERTUSSIS VACCINE, ADSORBED 0.5 MILLILITER(S): 5; 2.5; 8; 8; 2.5 SUSPENSION INTRAMUSCULAR at 20:07

## 2021-12-25 NOTE — ED ADULT NURSE NOTE - NSICDXPASTMEDICALHX_GEN_ALL_CORE_FT
PAST MEDICAL HISTORY:  BOOP (bronchiolitis obliterans with organizing pneumonia) 1991    Fibroids 1978    Granulomatous lung disease 1995 at [facility] had granuloma removed    ILD (interstitial lung disease)     Intestinal obstruction 1999    Lichen of skin vaginal area, 2012    Lymphadenopathy, inguinal right 1988, removed surgically    MDS (myelodysplastic syndrome) 2013, found incidentally    Mild dementia     Osteoarthritis     Osteoporosis     Pernicious anemia 2000    Pulmonary fibrosis 8/13    Sprain of right rotator cuff capsule, initial encounter

## 2021-12-25 NOTE — ED ADULT NURSE NOTE - OBJECTIVE STATEMENT
Presents to ER S/P slip and fall in shower.  Small pinhole lac to back of head, cleansed with NS. JUAREZ Cheney placing 1 staple.  Pt has dementia and is currently A.O x2, confused to president and time.  Awaiting CT brain.  No other wounds noted, pt denies any pain or headache.

## 2021-12-25 NOTE — ED ADULT NURSE NOTE - NSIMPLEMENTINTERV_GEN_ALL_ED
Implemented All Fall Risk Interventions:  [place] to call system. Call bell, personal items and telephone within reach. Instruct patient to call for assistance. Room bathroom lighting operational. Non-slip footwear when patient is off stretcher. Physically safe environment: no spills, clutter or unnecessary equipment. Stretcher in lowest position, wheels locked, appropriate side rails in place. Provide visual cue, wrist band, yellow gown, etc. Monitor gait and stability. Monitor for mental status changes and reorient to person, place, and time. Review medications for side effects contributing to fall risk. Reinforce activity limits and safety measures with patient and family.

## 2021-12-25 NOTE — ED PROVIDER NOTE - PATIENT PORTAL LINK FT
You can access the FollowMyHealth Patient Portal offered by [facility] by registering at the following website: [email or web address]. By joining [facility]’s FollowMyHealth portal, you will also be able to view your health information using other applications (apps) compatible with our system.

## 2021-12-25 NOTE — ED ADULT NURSE NOTE - NSICDXPASTSURGICALHX_GEN_ALL_CORE_FT
PAST SURGICAL HISTORY:  Elective surgery (VAT and biopsy, 2014)    S/P hip replacement right 2004    S/P hip replacement left 2013    S/P hysterectomy Left ovary left in place, 1978    S/P tubal ligation 1976

## 2021-12-25 NOTE — ED PROVIDER NOTE - NSFOLLOWUPINSTRUCTIONS_ED_ALL_ED_FT
do not wet for 24 hours  return in 7 days for removal  return to er for any worsening symptoms    Staple Care    WHAT YOU NEED TO KNOW:    Staples are often used to close a wound. Your staples may be placed for 3 to 14 days, depending on the location of your wound.    DISCHARGE INSTRUCTIONS:    Care for your wound:   •Clean: ?You may be able to shower in 24 hours. Do not soak your wound under water.      ?Gently wash your wound with soap and warm water daily. Lightly pat it dry. Do not cover your wound unless your healthcare provider tells you to.       ?You may also need to clean your wound with a mixture of hydrogen peroxide and water. Ask how to do this.      ?Do not apply ointment or cream to the wound unless your healthcare provider tells you to.      •Elevate: ?Rest any arm or leg that has a wound on pillows above the level of your heart. Do this as often as possible for 2 days. This will help decrease swelling and pain, and help you heal faster.          •Minimize scarring: ?Avoid sunshine on your wound to reduce scarring.             Follow up with your healthcare provider as directed: You may need to return for a wound checkup 3 days after your staples are placed. Ask when you should return to get your staples removed.    Staple removal:   •A medical staple remover will be used to take out your staples. Your healthcare provider will slide the tool under each staple, squeeze the handle, and gently pull the staple out.       •Medical tape will be placed on your wound once your staples are removed. This will help keep your wound closed. The medical tape will fall off on its own after several days.       Contact your healthcare provider if:   •You have redness, pain, swelling, and pus draining from your wound.      •Your pain medicine does not relieve your pain.      •You have a fever of 101°F (38.5°C) or higher.      •You have an odor coming from your wound.      •You have questions or concerns about your condition or care.      Return to the emergency department if:   •Your wound reopens.      •You have red streaks in your skin that spread out from your wound.      •You have severe pain or vomiting.

## 2021-12-25 NOTE — ED PROVIDER NOTE - OBJECTIVE STATEMENT
Pt is a 85 yo female with pmhx of OA mild dementia ILD fibroids BOOP intestinal obstruction OA pulmonary fibrosis here sp fall family witnessed from camera that pt slipped and fell in tub and laceration on scalp no other complaints no loc pt is not on any blood thinners no other complaints.

## 2021-12-25 NOTE — ED PROVIDER NOTE - NSICDXPASTMEDICALHX_GEN_ALL_CORE_FT
PAST MEDICAL HISTORY:  BOOP (bronchiolitis obliterans with organizing pneumonia) 1991    Fibroids 1978    Granulomatous lung disease 1995 at [facility] had granuloma removed    ILD (interstitial lung disease)     Intestinal obstruction 1999    Lichen of skin vaginal area, 2012    Lymphadenopathy, inguinal right 1988, removed surgically    MDS (myelodysplastic syndrome) 2013, found incidentally    Mild dementia     Osteoarthritis     Osteoporosis     Pernicious anemia 2000    Pulmonary fibrosis 8/13    Sprain of right rotator cuff capsule, initial encounter

## 2021-12-25 NOTE — ED PROVIDER NOTE - CONSTITUTIONAL, MLM
Well appearing, awake, alert, oriented to person, place, time/situation and in no apparent distress 0.5 cm laceration on scalp bleeding normal...

## 2021-12-25 NOTE — ED PROVIDER NOTE - ATTENDING CONTRIBUTION TO CARE
pt is an elderly female who has dementia fell in bath tub hit back of head no loc no neuro sx but sust laceration . lac repaired by pa  on eval:   elderly awake alert no distress female   heent small .5 cm lac to occiput  neck supple  cor rrr chest cta   abd soft nt nd  ext no trauma arthritic hands knees  full rom all joints  skin see above  neuro not focal   plan ct labs  and repair wound

## 2021-12-28 ENCOUNTER — NON-APPOINTMENT (OUTPATIENT)
Age: 86
End: 2021-12-28

## 2022-02-16 ENCOUNTER — RX RENEWAL (OUTPATIENT)
Age: 87
End: 2022-02-16

## 2022-03-03 NOTE — ED ADULT NURSE NOTE - HIV OFFER
[facility] EMERGENCY DEPARTMENT  [address] [address]  [phone]    Work/School Note    Date: 3/3/2022    To Whom It May concern:    Chris Morris was seen and treated today in the emergency room by the following provider(s):  Attending Provider: Boby Olson MD.      Chris Morris is excused from work/school on 03/03/22 and 03/04/22. She is medically clear to return to work/school on 3/5/2022.        Sincerely,          Bing Rubi MD Opt out

## 2022-03-24 ENCOUNTER — RX RENEWAL (OUTPATIENT)
Age: 87
End: 2022-03-24

## 2022-04-19 ENCOUNTER — APPOINTMENT (OUTPATIENT)
Dept: NEUROLOGY | Facility: CLINIC | Age: 87
End: 2022-04-19
Payer: MEDICARE

## 2022-04-19 VITALS
SYSTOLIC BLOOD PRESSURE: 140 MMHG | BODY MASS INDEX: 23.73 KG/M2 | HEART RATE: 90 BPM | DIASTOLIC BLOOD PRESSURE: 80 MMHG | WEIGHT: 110 LBS | HEIGHT: 57 IN | TEMPERATURE: 97.9 F

## 2022-04-19 PROCEDURE — 99214 OFFICE O/P EST MOD 30 MIN: CPT

## 2022-04-19 RX ORDER — SERTRALINE 25 MG/1
25 TABLET, FILM COATED ORAL
Qty: 30 | Refills: 0 | Status: DISCONTINUED | COMMUNITY
Start: 2021-10-05 | End: 2022-04-19

## 2022-04-19 NOTE — DISCUSSION/SUMMARY
[FreeTextEntry1] : 86-year-old F with PMHx of ILD, MDS, OA/OP, mildly evolving cognitive/memory problems.  Cognitive test ~ Global score 93.2, > 1 SD below average in Memory, verbal func; below average in 3 domains.\par \par # Dementia, senile, mildly progressive; f/u cognitive test in 3/2020 reveals Global score 83.7; significant drop in score ~ 10 points compared with previous test of 5/15/18\par \par - continue donepezil 10 mg daily.\par - Plan to Increase Memantine ER to 21 mg next refill\par - Recommended cognitive exercises, stay engaged in social activities, and physical activities\par -Follow-up cognitive test in 6 months\par \par # Loss of appetite - mood changes, improved with mirtazapine\par \par -Continue mirtazapine 7.5 mg at bedtime\par \par - Above discussed with Myriam - pts relative

## 2022-04-19 NOTE — REASON FOR VISIT
[Follow-Up: _____] : a [unfilled] follow-up visit [Other: _____] : [unfilled] [FreeTextEntry1] : for progressive Dementia

## 2022-04-19 NOTE — PHYSICAL EXAM
[General Appearance - Alert] : alert [General Appearance - In No Acute Distress] : in no acute distress [Mood] : the mood was normal [Person] : oriented to person [Place] : oriented to place [Registration Intact] : recent registration memory intact [Naming Objects] : no difficulty naming common objects [Repeating Phrases] : no difficulty repeating a phrase [Fluency] : fluency intact [Comprehension] : comprehension intact [Past History] : adequate knowledge of personal past history [Cranial Nerves Optic (II)] : visual acuity intact bilaterally,  visual fields full to confrontation, pupils equal round and reactive to light [Cranial Nerves Oculomotor (III)] : extraocular motion intact [Cranial Nerves Trigeminal (V)] : facial sensation intact symmetrically [Cranial Nerves Facial (VII)] : face symmetrical [Cranial Nerves Vestibulocochlear (VIII)] : hearing was intact bilaterally [Cranial Nerves Glossopharyngeal (IX)] : tongue and palate midline [Cranial Nerves Accessory (XI - Cranial And Spinal)] : head turning and shoulder shrug symmetric [Cranial Nerves Hypoglossal (XII)] : there was no tongue deviation with protrusion [Motor Tone] : muscle tone was normal in all four extremities [Motor Strength] : muscle strength was normal in all four extremities [No Muscle Atrophy] : normal bulk in all four extremities [Sensation Tactile Decrease] : light touch was intact [Abnormal Walk] : normal gait [2+] : Patella left 2+ [1+] : Ankle jerk left 1+ [PERRL With Normal Accommodation] : pupils were equal in size, round, reactive to light, with normal accommodation [Extraocular Movements] : extraocular movements were intact [Full Visual Field] : full visual field [] : the neck was supple [Neck Cervical Mass (___cm)] : no neck mass was observed [Edema] : there was no peripheral edema [Time] : disoriented to time [Concentration Intact] : a decrease in concentrating ability was observed [Current Events] : inadequate knowledge of current events [Romberg's Sign] : Romberg's sign was negtive [Past-pointing] : there was no past-pointing [Tremor] : no tremor present [Plantar Reflex Right Only] : normal on the right [Plantar Reflex Left Only] : normal on the left [FreeTextEntry4] : Cannot recall Presidents' name [FreeTextEntry6] : Right shoulder elevation slightly restricted

## 2022-04-19 NOTE — HISTORY OF PRESENT ILLNESS
[FreeTextEntry1] : Patient is here for follow-up, last seen on 10/5/21, is accompanied by her relative Myriam - [email or web address]. Pt has no complaints, her relative Myriam comes in from [place] every 6 weeks to check on her, patient's daughter Kamille supervises her on other days . Patient has healthcare aide 8 hours a day, at night she is monitored  remotely by video-alarm. \par \par Patient has no complaints, she is cheerful and interactive, has however declined in her cognition, does not seem aware of any current events, as per Puja she does not watch much TV, plays games on her computer, at the last visit I was informed that she was eating very poorly, was tired and, sleeping a lot, I started sertraline 25 mg daily, patient took it for a month, then her PMD switched her to Mirtazipine 7.5 mg daily, since then she has noted improvement in her appetite and is less tired.\par \par Pt is on donepezil and memantine ER 14 mgs, she has been taking the medicine regularly, has not had any adverse effects or any significant improvement. \par  \par Patient follows up with Dr. Rodriguez, is getting B12 injection.\par \par

## 2022-04-19 NOTE — REVIEW OF SYSTEMS
[Feeling Tired] : feeling tired [Memory Lapses or Loss] : memory loss [Decr. Concentrating Ability] : decreased concentrating ability [As Noted in HPI] : as noted in HPI [Lower Ext Edema] : lower extremity edema [Negative] : Heme/Lymph

## 2022-06-12 ENCOUNTER — RX RENEWAL (OUTPATIENT)
Age: 87
End: 2022-06-12

## 2022-06-13 ENCOUNTER — APPOINTMENT (OUTPATIENT)
Dept: INTERNAL MEDICINE | Facility: CLINIC | Age: 87
End: 2022-06-13
Payer: MEDICARE

## 2022-06-13 VITALS
WEIGHT: 101 LBS | SYSTOLIC BLOOD PRESSURE: 160 MMHG | HEIGHT: 56 IN | BODY MASS INDEX: 22.72 KG/M2 | OXYGEN SATURATION: 97 % | HEART RATE: 100 BPM | DIASTOLIC BLOOD PRESSURE: 80 MMHG | TEMPERATURE: 97.2 F

## 2022-06-13 PROCEDURE — 99214 OFFICE O/P EST MOD 30 MIN: CPT

## 2022-06-13 NOTE — HISTORY OF PRESENT ILLNESS
[Former] : former [Never] : never [Cough] : coughing [Wheezing] : wheezing [Nonspecific Pain, Swelling, And Stiffness] : chest pain [Fever] : fever [Difficulty Breathing During Exertion] : dyspnea on exertion [Feelings Of Weakness On Exertion] : exercise intolerance [Intermittent] : Intermittent [NC] : Nasal Cannula [Nightly] : Nightly [Does not check] : The patient is not checking peak flow at home [2  -  Slight] : 2, slight [Class II - Mild Symptoms and Slight Limitations] : II [Reviewed no changes] : Reviewed no changes [Designated Healthcare Proxy] : Designated healthcare proxy [Relationship: ___] : Relationship: [unfilled] [Nasal Passage Blockage (Stuffiness)] : edema [Wt Gain ___ kg] : No recent weight gain [Wt Loss ___ kg] : No recent weight loss [TextBox_4] : Comes in for routine pulmonary follow-up for interstitial lung disease.   Brought in by family member = Puja. Meds reviewed.\par Has been stable.\par She denies any new respiratory complaints.\par Edema improved with Lasix.  She denies any calf pain.  She denies any orthopnea or PND.  Her weight remains stable. Eating better. She denies any fevers or chills.  She denies any chest pain.  She denies any significant cough.  She denies any hemoptysis.  She denies any increased shortness of breath.  Family member reports that even her dyspnea on exertion with stairs seems better.\par  [de-identified] : Denies hemoptysis [FreeTextEntry1] : 2 [ESS] : 0 [TextBox_42] : 11/21 [AdvancecareDate] : 06/22 [More Frequent Use Needed Recently] : Patient reports no recent increase in frequency of

## 2022-06-13 NOTE — REVIEW OF SYSTEMS
[As Noted in HPI] : as noted in HPI [Dyspnea] : dyspnea [Memory Loss] : ~T memory lapses or loss [Negative] : Sleep Disorder

## 2022-06-13 NOTE — PHYSICAL EXAM
[General Appearance - Well Developed] : well developed [Well Groomed] : well groomed [General Appearance - Well Nourished] : well nourished [General Appearance - In No Acute Distress] : no acute distress [Jugular Venous Distention Increased] : there was no jugular-venous distention [Heart Rate And Rhythm] : heart rate and rhythm were normal [Heart Sounds] : normal S1 and S2 [Edema] : no peripheral edema present [] : no respiratory distress [Respiration, Rhythm And Depth] : normal respiratory rhythm and effort [Exaggerated Use Of Accessory Muscles For Inspiration] : no accessory muscle use [Bowel Sounds] : normal bowel sounds [Abdomen Soft] : soft [Abnormal Walk] : normal gait [Skin Color & Pigmentation] : normal skin color and pigmentation [Oriented To Time, Place, And Person] : oriented to person, place, and time [Affect] : the affect was normal [Nail Clubbing] : no clubbing of the fingernails [Cyanosis, Localized] : no localized cyanosis [FreeTextEntry1] : \rod In 1991, 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 mass lesion. Underwent VATS with open lung biopsy by Dr. Herman at [facility] [place]. 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

## 2022-06-13 NOTE — ASSESSMENT
[FreeTextEntry1] : ILD/HSP\par Clinically stable\par \par Will monitor CT chest - last done 11/2021\par PFT's and 6 minute walk with next visit = not sure if she will be able to cooperate\par Flu vaccine given 2021\par Had covid vaccinations= planning to take second booster vaccine\par No longer smokes\par VT declined\par Continue oxygen with exercise and with sleep at 2 LPM via nasal cannula as much as possible (patient non-cooperative due to dementia)\par To call for any pulmonary issues\par Wishes to RTC in 6 months and as needed\par All of the above was discussed in detail with the patient and Puja and all of their questions were answered\par They verbally confirmed understanding of all of the above and agreement with the above plan

## 2022-09-21 ENCOUNTER — RX RENEWAL (OUTPATIENT)
Age: 87
End: 2022-09-21

## 2022-11-22 ENCOUNTER — RX RENEWAL (OUTPATIENT)
Age: 87
End: 2022-11-22

## 2022-11-25 NOTE — ED PROVIDER NOTE - MDM PATIENT STATEMENT FOR ADDL TREATMENT
refaxed to [phone].  confirmation received Patient with one or more new problems requiring additional work-up/treatment.

## 2022-12-12 ENCOUNTER — APPOINTMENT (OUTPATIENT)
Dept: INTERNAL MEDICINE | Facility: CLINIC | Age: 87
End: 2022-12-12

## 2022-12-12 VITALS
BODY MASS INDEX: 22.72 KG/M2 | SYSTOLIC BLOOD PRESSURE: 112 MMHG | HEIGHT: 56 IN | DIASTOLIC BLOOD PRESSURE: 64 MMHG | OXYGEN SATURATION: 99 % | WEIGHT: 101 LBS | HEART RATE: 75 BPM | TEMPERATURE: 97.3 F

## 2022-12-12 PROCEDURE — 99213 OFFICE O/P EST LOW 20 MIN: CPT | Mod: 25

## 2022-12-12 RX ORDER — MIRTAZAPINE 7.5 MG/1
7.5 TABLET, FILM COATED ORAL
Qty: 30 | Refills: 0 | Status: DISCONTINUED | COMMUNITY
Start: 2021-11-29 | End: 2022-12-12

## 2022-12-12 RX ORDER — MIRTAZAPINE 15 MG/1
15 TABLET, FILM COATED ORAL
Qty: 90 | Refills: 0 | Status: ACTIVE | COMMUNITY
Start: 2022-10-14

## 2022-12-12 NOTE — ASSESSMENT
[FreeTextEntry1] : ILD/HSP\par Clinically stable\par \par Will monitor CT chest - last done 11/2021\par PFT's and 6 minute walk with next visit = not sure if she will be able to cooperate\par Flu vaccine given 2022\par Had covid vaccinations= planning to take booster vaccine\par No longer smokes\par NE declined\par Continue oxygen with exercise and with sleep at 2 LPM via nasal cannula as much as possible (patient non-cooperative due to dementia)\par To call for any pulmonary issues\par Wishes to RTC in 6 months and as needed\par All of the above was discussed in detail with the patient and Puja and all of their questions were answered\par They verbally confirmed understanding of all of the above and agreement with the above plan

## 2022-12-12 NOTE — PHYSICAL EXAM
[General Appearance - Well Developed] : well developed [Well Groomed] : well groomed [General Appearance - Well Nourished] : well nourished [General Appearance - In No Acute Distress] : no acute distress [Jugular Venous Distention Increased] : there was no jugular-venous distention [Heart Rate And Rhythm] : heart rate and rhythm were normal [Heart Sounds] : normal S1 and S2 [Edema] : no peripheral edema present [] : no respiratory distress [Respiration, Rhythm And Depth] : normal respiratory rhythm and effort [Exaggerated Use Of Accessory Muscles For Inspiration] : no accessory muscle use [Bowel Sounds] : normal bowel sounds [Abdomen Soft] : soft [Abnormal Walk] : normal gait [Skin Color & Pigmentation] : normal skin color and pigmentation [Oriented To Time, Place, And Person] : oriented to person, place, and time [Affect] : the affect was normal [Nail Clubbing] : no clubbing of the fingernails [Cyanosis, Localized] : no localized cyanosis [FreeTextEntry1] : \rod In 1991, 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 mass lesion. Underwent VATS with open lung biopsy by Dr. Herman at [facility] [place]. 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

## 2022-12-12 NOTE — HISTORY OF PRESENT ILLNESS
[Former] : former [Never] : never [Cough] : coughing [Wheezing] : wheezing [Nasal Passage Blockage (Stuffiness)] : edema [Nonspecific Pain, Swelling, And Stiffness] : chest pain [Fever] : fever [Difficulty Breathing During Exertion] : dyspnea on exertion [Feelings Of Weakness On Exertion] : exercise intolerance [Intermittent] : Intermittent [NC] : Nasal Cannula [Nightly] : Nightly [Does not check] : The patient is not checking peak flow at home [2  -  Slight] : 2, slight [Class II - Mild Symptoms and Slight Limitations] : II [Reviewed no changes] : Reviewed no changes [Designated Healthcare Proxy] : Designated healthcare proxy [Relationship: ___] : Relationship: [unfilled] [Wt Gain ___ kg] : No recent weight gain [Wt Loss ___ kg] : No recent weight loss [TextBox_4] : Comes in for routine pulmonary follow-up for interstitial lung disease.   Brought in by family member = Puja. Meds reviewed.\par Has been stable.\par She denies any new respiratory complaints. She denies any edema or calf pain.  She denies any orthopnea or PND.  Her weight remains stable. She denies any fevers or chills.  She denies any chest pain.  She denies any significant cough.  She denies any hemoptysis.  She denies any increased shortness of breath. \par  [de-identified] : Denies hemoptysis [FreeTextEntry1] : 2 [ESS] : 0 [TextBox_42] : 11/21 [AdvancecareDate] : 12/22 [More Frequent Use Needed Recently] : Patient reports no recent increase in frequency of

## 2022-12-13 ENCOUNTER — RX RENEWAL (OUTPATIENT)
Age: 87
End: 2022-12-13

## 2022-12-19 ENCOUNTER — APPOINTMENT (OUTPATIENT)
Dept: NEUROLOGY | Facility: CLINIC | Age: 87
End: 2022-12-19

## 2022-12-19 VITALS
HEIGHT: 56 IN | WEIGHT: 99 LBS | DIASTOLIC BLOOD PRESSURE: 84 MMHG | HEART RATE: 66 BPM | SYSTOLIC BLOOD PRESSURE: 133 MMHG | BODY MASS INDEX: 22.27 KG/M2

## 2022-12-19 PROCEDURE — 96138 PSYCL/NRPSYC TECH 1ST: CPT | Mod: 59

## 2022-12-19 PROCEDURE — 96132 NRPSYC TST EVAL PHYS/QHP 1ST: CPT | Mod: 59

## 2022-12-19 PROCEDURE — 99213 OFFICE O/P EST LOW 20 MIN: CPT | Mod: 25

## 2022-12-19 NOTE — HISTORY OF PRESENT ILLNESS
[FreeTextEntry1] : Is here for a follow-up visit today, she is accompanied by her relative Puja, was last seen on 4/19/2022.  The patient, she has no complaints, she reports she keeps herself busy, she plays games on the computer, she is not much interested in TV or listening to music, Puja states that she is being monitored all the time, she also has an healthcare aide 8-4 o'clock daily. \par \par No significant change has been noted in patient's cognition or daily activities, she is taking memantine 14 Mg daily in addition to donepezil 10 Mg daily.\par \par And is here for f/u cognitive testing today

## 2022-12-19 NOTE — DISCUSSION/SUMMARY
[FreeTextEntry1] : 87-year-old F with PMHx of ILD, MDS, OA/OP, mildly evolving cognitive/memory problems.  Cognitive test ~ Global score 93.2, > 1 SD below average in Memory, verbal func; below average in 3 domains.\par \par # Dementia, senile, mildly progressive; f/u cognitive test today reveal Global score 76.3, memory score is stable, slight drop in scores in attention and information processing compared with the prior test of/2020; \par \par - continue donepezil 10 mg daily.\par - Plan to Increase Memantine ER to 21 mg next refill\par - Recommended cognitive exercises, stay engaged in social activities, and physical activities\par -Follow-up cognitive test in 6 months\par \par # Loss of appetite - mood changes, improved with mirtazapine\par \par -Continue mirtazapine 7.5 mg at bedtime\par \par - Above discussed with Myriam - pts relative

## 2022-12-19 NOTE — DATA REVIEWED
Patient Evaluated for home oxygen per physician order.  Sp02 on room air at rest: 91%  Heart Rate on room air at rest: 72  Respiratory Rate on room air at rest: 20  Patient off of oxygen for 10 Minutes  Patient ambulated (320 feet).  Sp02 during activity : 94% on room air  Heart Rate during activity: 81  Respiratory Rate during activity. 24    Based on findings, patient Sp02 level did not warrant home oxygen. Patient does not qualify.   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\par \par \par  [de-identified] : 3/9/20: Cognitive assessment\par Global cognitive score: 83.7\par More than one standard deviation below average: Memory 75, motor skills 83.8 verbal function \par Below average in domains:  attention 87.3, executive function 85.6,  information processing speed 86.6, \par Above average in domains: None\par 5/15/18: Cognitive assessment: Global battery\par Global cognitive score: 93.2\par More than one standard deviation below average: Memory 81.6,  verbal function 72.9\par Below average in domains:  attention 91.8, executive function 91,  information processing speed 96.2, \par Above average in domains:visuospatial function 109.8,  motor skills 108.8 [de-identified] : 2/1/18; B12, TSH normal\par Rest of labs reviewed

## 2022-12-19 NOTE — PROCEDURE
[FreeTextEntry1] :  Cognitive testing\par \par Global cognitive score: 76.3\par \par More than one SD below average: Memory 75.2, attention 71, information processing speed 64.4 \par \par Below average in domains:  executive function 85.7, noted skills 85.4\par \par Above average in domains: None\par \par Visual-spatial and verbal function could not be scored due to insufficient data\par

## 2023-01-01 ENCOUNTER — APPOINTMENT (OUTPATIENT)
Dept: INTERNAL MEDICINE | Facility: CLINIC | Age: 88
End: 2023-01-01
Payer: MEDICARE

## 2023-01-01 ENCOUNTER — RX RENEWAL (OUTPATIENT)
Age: 88
End: 2023-01-01

## 2023-01-01 ENCOUNTER — TRANSCRIPTION ENCOUNTER (OUTPATIENT)
Age: 88
End: 2023-01-01

## 2023-01-01 ENCOUNTER — APPOINTMENT (OUTPATIENT)
Dept: NEUROLOGY | Facility: CLINIC | Age: 88
End: 2023-01-01
Payer: MEDICARE

## 2023-01-01 VITALS
DIASTOLIC BLOOD PRESSURE: 77 MMHG | SYSTOLIC BLOOD PRESSURE: 145 MMHG | HEIGHT: 56 IN | HEART RATE: 68 BPM | WEIGHT: 105 LBS | TEMPERATURE: 97.8 F | BODY MASS INDEX: 23.62 KG/M2

## 2023-01-01 VITALS
OXYGEN SATURATION: 98 % | DIASTOLIC BLOOD PRESSURE: 70 MMHG | SYSTOLIC BLOOD PRESSURE: 120 MMHG | WEIGHT: 107 LBS | TEMPERATURE: 96.3 F | BODY MASS INDEX: 23.99 KG/M2 | HEART RATE: 74 BPM

## 2023-01-01 VITALS
DIASTOLIC BLOOD PRESSURE: 74 MMHG | HEART RATE: 68 BPM | BODY MASS INDEX: 23.39 KG/M2 | TEMPERATURE: 97.6 F | HEIGHT: 56 IN | WEIGHT: 104 LBS | OXYGEN SATURATION: 97 % | SYSTOLIC BLOOD PRESSURE: 124 MMHG

## 2023-01-01 DIAGNOSIS — J67.9 HYPERSENSITIVITY PNEUMONITIS DUE TO UNSPECIFIED ORGANIC DUST: ICD-10-CM

## 2023-01-01 DIAGNOSIS — J84.9 INTERSTITIAL PULMONARY DISEASE, UNSPECIFIED: ICD-10-CM

## 2023-01-01 DIAGNOSIS — F03.90 UNSPECIFIED DEMENTIA W/OUT BEHAVIORAL DISTURBANCE: ICD-10-CM

## 2023-01-01 PROCEDURE — 94726 PLETHYSMOGRAPHY LUNG VOLUMES: CPT

## 2023-01-01 PROCEDURE — 94618 PULMONARY STRESS TESTING: CPT

## 2023-01-01 PROCEDURE — 94729 DIFFUSING CAPACITY: CPT

## 2023-01-01 PROCEDURE — 99213 OFFICE O/P EST LOW 20 MIN: CPT | Mod: 95

## 2023-01-01 PROCEDURE — 99214 OFFICE O/P EST MOD 30 MIN: CPT

## 2023-01-01 PROCEDURE — 94010 BREATHING CAPACITY TEST: CPT

## 2023-01-01 PROCEDURE — 99213 OFFICE O/P EST LOW 20 MIN: CPT | Mod: 25

## 2023-01-01 PROCEDURE — 99214 OFFICE O/P EST MOD 30 MIN: CPT | Mod: 25

## 2023-01-01 RX ORDER — DONEPEZIL HYDROCHLORIDE 10 MG/1
10 TABLET ORAL
Qty: 90 | Refills: 1 | Status: ACTIVE | COMMUNITY
Start: 2018-03-28 | End: 1900-01-01

## 2023-01-01 RX ORDER — MEMANTINE HYDROCHLORIDE 21 MG/1
21 CAPSULE, EXTENDED RELEASE ORAL
Qty: 90 | Refills: 1 | Status: ACTIVE | COMMUNITY
Start: 2020-03-09 | End: 1900-01-01

## 2023-01-01 NOTE — ED ADULT NURSE NOTE - NSSUHOSCREENINGYN_ED_ALL_ED
Your baby's physican has recommended  Similac Sensitive be the formula you use to feed your . Your formula-fed  should be taking from 2 to 3 ounces (60 - 90 ml) of formula per feeding and will eat every 3 to 4 hours on average during the first few weeks of life.     During these first few weeks if your baby sleeps longer than 4  hours and starts missing feedings, Wake your baby up and offer a bottle. By the end of the first month baby will be up to at least 4 ounces (120 ml) per feeding with a fairly predictable schedule,  feedings about every 4 hours.    Formula Feeding  Give formula with added iron (iron-fortified).  Formula can be powder, liquid that you add water to, or ready-to-feed liquid. Powder formula is the cheapest. Refrigerate formula after you mix it with water. Never heat up a bottle in the microwave.  Boil well water and cool it down before you mix it with formula.  Wash bottles and nipples in hot, soapy water or clean them in the .  Bottles and formula do not need to be boiled (sterilized) if the water supply is safe.  Newborns should be fed no less than every 2-3 hours during the day. Feed him or her every 4-5 hours during the night. There should be at least 8 feedings in a 24 hour period.  Wake your  if it has been 3-4 hours since you last fed him or her.  Burp your  after every ounce (30 mL) of formula.  Give your  vitamin D drops if he or she drinks less than 17 ounces (500 mL) of formula each day.  Do not add water, juice, or solid foods to your 's diet until his or her doctor approves.  Call your 's doctor if your  has trouble feeding. This includes not finishing a feeding, spitting up a feeding, not being interested in feeding, or refusing two or more feedings.  Call your 's doctor if your  cries often after a feeding.    If you have questions and/or concerns about feeding your  after discharge, call a speak  with a nurse at [facility] at [phone].    Yes - the patient is able to be screened

## 2023-02-13 ENCOUNTER — RX RENEWAL (OUTPATIENT)
Age: 88
End: 2023-02-13

## 2023-03-27 ENCOUNTER — RX RENEWAL (OUTPATIENT)
Age: 88
End: 2023-03-27

## 2023-05-02 ENCOUNTER — RX RENEWAL (OUTPATIENT)
Age: 88
End: 2023-05-02

## 2023-06-08 NOTE — DISCUSSION/SUMMARY
[FreeTextEntry1] : 87-year-old F with PMHx of ILD, MDS, OA/OP, mildly evolving cognitive/memory problems.  Cognitive test ~ Global score 93.2, > 1 SD below average in Memory, verbal func; below average in 3 domains.\par \par # Dementia, senile, mildly progressive; patient still functional, needs supervision and help with all ADLs\par \par - continue donepezil 10 mg daily.\par - Increase Memantine ER to 21 mgs\par - Recommended cognitive exercises, stay engaged in social activities, and physical activities\par - Follow-up cognitive test in 6 months\par \par # Loss of appetite - mood changes, improved with mirtazapine\par \par -Continue mirtazapine 7.5 mg at bedtime\par \par - Above discussed with Myriam - pts relative

## 2023-06-08 NOTE — REVIEW OF SYSTEMS
[Feeling Tired] : feeling tired [Memory Lapses or Loss] : memory loss [Decr. Concentrating Ability] : decreased concentrating ability [As Noted in HPI] : as noted in HPI [Lower Ext Edema] : lower extremity edema [Negative] : Heme/Lymph [Recent Weight Gain (___ Lbs)] : recent [unfilled] ~Ulb weight gain

## 2023-06-08 NOTE — HISTORY OF PRESENT ILLNESS
[Home] : at home, [unfilled] , at the time of the visit. [Medical Office: ([facility])___] : at the medical office located in  [Friend] : friend [FreeTextEntry3] : Puja, pts  - friend - relative [FreeTextEntry1] : Patient for telehealth follow-up visit, last seen on 12/19/22. Pt had cognitive testing done at the last visit, revealed slight drop in scores especially in information processing and attention, she is on donepezil 10 Mg daily in addition to memantine 14 Mg daily.\par \par Patient reports she feels well, she has been active, she plays cards with her healthcare aides during daytime, and is plays solitaire on the computer, she reports she keeps herself busy.  Puja who is visiting her from [place] reports she has been talking to her daily, has been in touch with her healthcare aides, she has not heard any significant change in her behavior, attitude or any decline in functioning\par \par Patient has been sleeping well, wakes up to go to the bathroom, is not depressed, she is on mirtazapine 7.5 mg at bedtime

## 2023-06-08 NOTE — PHYSICAL EXAM
[General Appearance - Alert] : alert [General Appearance - In No Acute Distress] : in no acute distress [Mood] : the mood was normal [Person] : oriented to person [Naming Objects] : no difficulty naming common objects [Cranial Nerves Optic (II)] : visual acuity intact bilaterally,  visual fields full to confrontation, pupils equal round and reactive to light [Cranial Nerves Oculomotor (III)] : extraocular motion intact [Cranial Nerves Facial (VII)] : face symmetrical [Cranial Nerves Vestibulocochlear (VIII)] : hearing was intact bilaterally [Cranial Nerves Accessory (XI - Cranial And Spinal)] : head turning and shoulder shrug symmetric [Cranial Nerves Hypoglossal (XII)] : there was no tongue deviation with protrusion [General Appearance - Well-Appearing] : healthy appearing [Place] : oriented to place [Time] : oriented to time [Registration Intact] : recent registration memory intact [Repeating Phrases] : no difficulty repeating a phrase [Fluency] : fluency intact [Comprehension] : comprehension intact [Short Term Intact] : short term memory impaired [Current Events] : inadequate knowledge of current events [FreeTextEntry4] : knows [FreeTextEntry6] : Limited virtual motor exam

## 2023-06-08 NOTE — DATA REVIEWED
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 Global cognitive score: 76.3\par More than one SD below average: Memory 75.2, attention 71, information processing speed 64.4 \par Below average in domains:  executive function 85.7, noted skills 85.4\par Above average in domains: None\par Visual-spatial and verbal function could not be scored due to insufficient data\par 3/9/20: Cognitive assessment\par Global cognitive score: 83.7\par More than one standard deviation below average: Memory 75, motor skills 83.8 verbal function \par Below average in domains:  attention 87.3, executive function 85.6,  information processing speed 86.6, \par Above average in domains: None\par 5/15/18: Cognitive assessment: Global battery\par Global cognitive score: 93.2\par More than one standard deviation below average: Memory 81.6,  verbal function 72.9\par Below average in domains:  attention 91.8, executive function 91,  information processing speed 96.2, \par Above average in domains:visuospatial function 109.8,  motor skills 108.8 [de-identified] : 2/1/18; B12, TSH normal\par Rest of labs reviewed

## 2023-06-12 NOTE — HISTORY OF PRESENT ILLNESS
[Cough] : coughing [Wheezing] : wheezing [Nasal Passage Blockage (Stuffiness)] : edema [Nonspecific Pain, Swelling, And Stiffness] : chest pain [Fever] : fever [Difficulty Breathing During Exertion] : dyspnea on exertion [Feelings Of Weakness On Exertion] : exercise intolerance [Intermittent] : Intermittent [NC] : Nasal Cannula [Nightly] : Nightly [Does not check] : The patient is not checking peak flow at home [2  -  Slight] : 2, slight [Class II - Mild Symptoms and Slight Limitations] : II [Reviewed no changes] : Reviewed no changes [Designated Healthcare Proxy] : Designated healthcare proxy [Relationship: ___] : Relationship: [unfilled] [Former] : former [Never] : never [Wt Gain ___ kg] : Recent [unfilled] kg(s) weight gain [Wt Loss ___ kg] : No recent weight loss [TextBox_4] : Comes in for routine pulmonary follow-up for interstitial lung disease.   Brought in by family member = Puja. Meds reviewed.\par Has been stable.\par She denies any new respiratory complaints. She denies any edema or calf pain.  She denies any orthopnea or PND.  Her weight remains stable. She denies any fevers or chills.  She denies any chest pain.  She denies any significant cough.  She denies any hemoptysis.  She denies any increased shortness of breath. \par  [de-identified] : Denies hemoptysis [FreeTextEntry1] : 2 [ESS] : 0 [TextBox_42] : 11/21 [AdvancecareDate] : 06/23 [More Frequent Use Needed Recently] : Patient reports no recent increase in frequency of

## 2023-06-12 NOTE — PROCEDURE
[FreeTextEntry1] : Spirometry done at bedside\par Within normal limits\par Results discussed with patient and Puja

## 2023-06-12 NOTE — PHYSICAL EXAM
[General Appearance - Well Developed] : well developed [Well Groomed] : well groomed [General Appearance - Well Nourished] : well nourished [General Appearance - In No Acute Distress] : no acute distress [Jugular Venous Distention Increased] : there was no jugular-venous distention [Heart Rate And Rhythm] : heart rate and rhythm were normal [Heart Sounds] : normal S1 and S2 [Edema] : no peripheral edema present [] : no respiratory distress [Respiration, Rhythm And Depth] : normal respiratory rhythm and effort [Exaggerated Use Of Accessory Muscles For Inspiration] : no accessory muscle use [Bowel Sounds] : normal bowel sounds [Abdomen Soft] : soft [Abnormal Walk] : normal gait [Skin Color & Pigmentation] : normal skin color and pigmentation [Oriented To Time, Place, And Person] : oriented to person, place, and time [Affect] : the affect was normal [Nail Clubbing] : no clubbing of the fingernails [Cyanosis, Localized] : no localized cyanosis [FreeTextEntry1] : \rod In 1991, 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 mass lesion. [place] [facility] [place] 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

## 2023-06-12 NOTE — ASSESSMENT
[FreeTextEntry1] : ILD/HSP\par Clinically stable\par They declined further chest CT imaging\par PFT's and 6 minute walk with next visit = not sure if she will be able to cooperate\par Flu vaccine given 2022\par Had covid vaccinations\par No longer smokes\par MT declined\par Continue oxygen with exercise and with sleep at 2 LPM via nasal cannula as much as possible (patient non-cooperative due to dementia)\par To call for any pulmonary issues or if her status worsens and to return in follow-up immediately\par Wishes to RTC in 6 months and as needed\par All of the above was discussed in detail with the patient and Puja and all of their questions were answered\par They verbally confirmed understanding of all of the above and agreement with the above plan

## 2023-09-27 NOTE — CONSULT NOTE ADULT - CONSULT REQUESTED DATE/TIME
Pt advised ER MD is going to talk to endocrinology. Pt verbalized understanding.    11-Dec-2020 16:27

## 2023-12-12 PROBLEM — F03.90 SENILE DEMENTIA WITHOUT BEHAVIORAL DISTURBANCE: Status: ACTIVE | Noted: 2020-03-09

## 2024-01-01 ENCOUNTER — INPATIENT (INPATIENT)
Facility: HOSPITAL | Age: 89
LOS: 10 days | DRG: 871 | End: 2024-06-07
Attending: STUDENT IN AN ORGANIZED HEALTH CARE EDUCATION/TRAINING PROGRAM | Admitting: INTERNAL MEDICINE
Payer: MEDICARE

## 2024-01-01 ENCOUNTER — RESULT REVIEW (OUTPATIENT)
Age: 89
End: 2024-01-01

## 2024-01-01 VITALS
HEART RATE: 87 BPM | DIASTOLIC BLOOD PRESSURE: 52 MMHG | RESPIRATION RATE: 16 BRPM | SYSTOLIC BLOOD PRESSURE: 85 MMHG | WEIGHT: 110.01 LBS | TEMPERATURE: 98 F | HEIGHT: 59 IN | OXYGEN SATURATION: 99 %

## 2024-01-01 VITALS
HEART RATE: 98 BPM | SYSTOLIC BLOOD PRESSURE: 129 MMHG | DIASTOLIC BLOOD PRESSURE: 60 MMHG | RESPIRATION RATE: 17 BRPM | TEMPERATURE: 97 F | OXYGEN SATURATION: 98 %

## 2024-01-01 DIAGNOSIS — S22.39XA FRACTURE OF ONE RIB, UNSPECIFIED SIDE, INITIAL ENCOUNTER FOR CLOSED FRACTURE: ICD-10-CM

## 2024-01-01 DIAGNOSIS — A41.9 SEPSIS, UNSPECIFIED ORGANISM: ICD-10-CM

## 2024-01-01 DIAGNOSIS — B27.00 GAMMAHERPESVIRAL MONONUCLEOSIS WITHOUT COMPLICATION: ICD-10-CM

## 2024-01-01 DIAGNOSIS — Z96.60 PRESENCE OF UNSPECIFIED ORTHOPEDIC JOINT IMPLANT: Chronic | ICD-10-CM

## 2024-01-01 DIAGNOSIS — Z41.9 ENCOUNTER FOR PROCEDURE FOR PURPOSES OTHER THAN REMEDYING HEALTH STATE, UNSPECIFIED: Chronic | ICD-10-CM

## 2024-01-01 DIAGNOSIS — R93.89 ABNORMAL FINDINGS ON DIAGNOSTIC IMAGING OF OTHER SPECIFIED BODY STRUCTURES: ICD-10-CM

## 2024-01-01 DIAGNOSIS — R79.89 OTHER SPECIFIED ABNORMAL FINDINGS OF BLOOD CHEMISTRY: ICD-10-CM

## 2024-01-01 DIAGNOSIS — N17.9 ACUTE KIDNEY FAILURE, UNSPECIFIED: ICD-10-CM

## 2024-01-01 DIAGNOSIS — D64.9 ANEMIA, UNSPECIFIED: ICD-10-CM
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
## 2024-01-01 DIAGNOSIS — F03.90 UNSPECIFIED DEMENTIA, UNSPECIFIED SEVERITY, WITHOUT BEHAVIORAL DISTURBANCE, PSYCHOTIC DISTURBANCE, MOOD DISTURBANCE, AND ANXIETY: ICD-10-CM

## 2024-01-01 DIAGNOSIS — J18.9 PNEUMONIA, UNSPECIFIED ORGANISM: ICD-10-CM

## 2024-01-01 DIAGNOSIS — Z71.89 OTHER SPECIFIED COUNSELING: ICD-10-CM

## 2024-01-01 DIAGNOSIS — Z51.5 ENCOUNTER FOR PALLIATIVE CARE: ICD-10-CM

## 2024-01-01 DIAGNOSIS — Z98.51 TUBAL LIGATION STATUS: Chronic | ICD-10-CM

## 2024-01-01 DIAGNOSIS — Z29.9 ENCOUNTER FOR PROPHYLACTIC MEASURES, UNSPECIFIED: ICD-10-CM

## 2024-01-01 DIAGNOSIS — N39.0 URINARY TRACT INFECTION, SITE NOT SPECIFIED: ICD-10-CM

## 2024-01-01 DIAGNOSIS — Z90.710 ACQUIRED ABSENCE OF BOTH CERVIX AND UTERUS: Chronic | ICD-10-CM

## 2024-01-01 DIAGNOSIS — J96.01 ACUTE RESPIRATORY FAILURE WITH HYPOXIA: ICD-10-CM

## 2024-01-01 LAB
ALBUMIN SERPL ELPH-MCNC: 1.8 G/DL — LOW (ref 3.3–5)
ALBUMIN SERPL ELPH-MCNC: 1.9 G/DL — LOW (ref 3.3–5)
ALBUMIN SERPL ELPH-MCNC: 2 G/DL — LOW (ref 3.3–5)
ALBUMIN SERPL ELPH-MCNC: 2.1 G/DL — LOW (ref 3.3–5)
ALBUMIN SERPL ELPH-MCNC: 2.3 G/DL — LOW (ref 3.3–5)
ALBUMIN SERPL ELPH-MCNC: 2.6 G/DL — LOW (ref 3.3–5)
ALP SERPL-CCNC: 45 U/L — SIGNIFICANT CHANGE UP (ref 40–120)
ALP SERPL-CCNC: 47 U/L — SIGNIFICANT CHANGE UP (ref 40–120)
ALP SERPL-CCNC: 48 U/L — SIGNIFICANT CHANGE UP (ref 40–120)
ALP SERPL-CCNC: 50 U/L — SIGNIFICANT CHANGE UP (ref 40–120)
ALP SERPL-CCNC: 57 U/L — SIGNIFICANT CHANGE UP (ref 40–120)
ALP SERPL-CCNC: 59 U/L — SIGNIFICANT CHANGE UP (ref 40–120)
ALP SERPL-CCNC: 63 U/L — SIGNIFICANT CHANGE UP (ref 40–120)
ALP SERPL-CCNC: 64 U/L — SIGNIFICANT CHANGE UP (ref 40–120)
ALT FLD-CCNC: 15 U/L — SIGNIFICANT CHANGE UP (ref 12–78)
ALT FLD-CCNC: 19 U/L — SIGNIFICANT CHANGE UP (ref 12–78)
ALT FLD-CCNC: 22 U/L — SIGNIFICANT CHANGE UP (ref 12–78)
ALT FLD-CCNC: 23 U/L — SIGNIFICANT CHANGE UP (ref 12–78)
ALT FLD-CCNC: 33 U/L — SIGNIFICANT CHANGE UP (ref 12–78)
ALT FLD-CCNC: 35 U/L — SIGNIFICANT CHANGE UP (ref 12–78)
ALT FLD-CCNC: 35 U/L — SIGNIFICANT CHANGE UP (ref 12–78)
ALT FLD-CCNC: 42 U/L — SIGNIFICANT CHANGE UP (ref 12–78)
ANION GAP SERPL CALC-SCNC: 3 MMOL/L — LOW (ref 5–17)
ANION GAP SERPL CALC-SCNC: 4 MMOL/L — LOW (ref 5–17)
ANION GAP SERPL CALC-SCNC: 5 MMOL/L — SIGNIFICANT CHANGE UP (ref 5–17)
ANION GAP SERPL CALC-SCNC: 6 MMOL/L — SIGNIFICANT CHANGE UP (ref 5–17)
APPEARANCE UR: ABNORMAL
APTT BLD: 29.5 SEC — SIGNIFICANT CHANGE UP (ref 24.5–35.6)
AST SERPL-CCNC: 15 U/L — SIGNIFICANT CHANGE UP (ref 15–37)
AST SERPL-CCNC: 17 U/L — SIGNIFICANT CHANGE UP (ref 15–37)
AST SERPL-CCNC: 19 U/L — SIGNIFICANT CHANGE UP (ref 15–37)
AST SERPL-CCNC: 26 U/L — SIGNIFICANT CHANGE UP (ref 15–37)
AST SERPL-CCNC: 29 U/L — SIGNIFICANT CHANGE UP (ref 15–37)
AST SERPL-CCNC: 37 U/L — SIGNIFICANT CHANGE UP (ref 15–37)
AST SERPL-CCNC: 43 U/L — HIGH (ref 15–37)
AST SERPL-CCNC: 50 U/L — HIGH (ref 15–37)
BASE EXCESS BLDA CALC-SCNC: 4 MMOL/L — HIGH (ref -2–3)
BASE EXCESS BLDA CALC-SCNC: 4.3 MMOL/L — HIGH (ref -2–3)
BASOPHILS # BLD AUTO: 0 K/UL — SIGNIFICANT CHANGE UP (ref 0–0.2)
BASOPHILS # BLD AUTO: 0 K/UL — SIGNIFICANT CHANGE UP (ref 0–0.2)
BASOPHILS # BLD AUTO: 0.04 K/UL — SIGNIFICANT CHANGE UP (ref 0–0.2)
BASOPHILS # BLD AUTO: 0.05 K/UL — SIGNIFICANT CHANGE UP (ref 0–0.2)
BASOPHILS # BLD AUTO: 0.05 K/UL — SIGNIFICANT CHANGE UP (ref 0–0.2)
BASOPHILS # BLD AUTO: 0.06 K/UL — SIGNIFICANT CHANGE UP (ref 0–0.2)
BASOPHILS # BLD AUTO: 0.15 K/UL — SIGNIFICANT CHANGE UP (ref 0–0.2)
BASOPHILS NFR BLD AUTO: 0 % — SIGNIFICANT CHANGE UP (ref 0–2)
BASOPHILS NFR BLD AUTO: 0 % — SIGNIFICANT CHANGE UP (ref 0–2)
BASOPHILS NFR BLD AUTO: 0.3 % — SIGNIFICANT CHANGE UP (ref 0–2)
BASOPHILS NFR BLD AUTO: 0.4 % — SIGNIFICANT CHANGE UP (ref 0–2)
BASOPHILS NFR BLD AUTO: 0.5 % — SIGNIFICANT CHANGE UP (ref 0–2)
BASOPHILS NFR BLD AUTO: 0.8 % — SIGNIFICANT CHANGE UP (ref 0–2)
BILIRUB SERPL-MCNC: 0.4 MG/DL — SIGNIFICANT CHANGE UP (ref 0.2–1.2)
BILIRUB SERPL-MCNC: 0.4 MG/DL — SIGNIFICANT CHANGE UP (ref 0.2–1.2)
BILIRUB SERPL-MCNC: 0.5 MG/DL — SIGNIFICANT CHANGE UP (ref 0.2–1.2)
BILIRUB SERPL-MCNC: 0.5 MG/DL — SIGNIFICANT CHANGE UP (ref 0.2–1.2)
BILIRUB SERPL-MCNC: 0.6 MG/DL — SIGNIFICANT CHANGE UP (ref 0.2–1.2)
BILIRUB SERPL-MCNC: 0.6 MG/DL — SIGNIFICANT CHANGE UP (ref 0.2–1.2)
BILIRUB SERPL-MCNC: 0.7 MG/DL — SIGNIFICANT CHANGE UP (ref 0.2–1.2)
BILIRUB SERPL-MCNC: 0.8 MG/DL — SIGNIFICANT CHANGE UP (ref 0.2–1.2)
BILIRUB UR-MCNC: NEGATIVE — SIGNIFICANT CHANGE UP
BLOOD GAS COMMENTS ARTERIAL: SIGNIFICANT CHANGE UP
BLOOD GAS COMMENTS ARTERIAL: SIGNIFICANT CHANGE UP
BUN SERPL-MCNC: 23 MG/DL — SIGNIFICANT CHANGE UP (ref 7–23)
BUN SERPL-MCNC: 25 MG/DL — HIGH (ref 7–23)
BUN SERPL-MCNC: 28 MG/DL — HIGH (ref 7–23)
BUN SERPL-MCNC: 37 MG/DL — HIGH (ref 7–23)
BUN SERPL-MCNC: 38 MG/DL — HIGH (ref 7–23)
BUN SERPL-MCNC: 39 MG/DL — HIGH (ref 7–23)
BUN SERPL-MCNC: 39 MG/DL — HIGH (ref 7–23)
BUN SERPL-MCNC: 41 MG/DL — HIGH (ref 7–23)
BUN SERPL-MCNC: 42 MG/DL — HIGH (ref 7–23)
CALCIUM SERPL-MCNC: 8.2 MG/DL — LOW (ref 8.5–10.1)
CALCIUM SERPL-MCNC: 8.4 MG/DL — LOW (ref 8.5–10.1)
CALCIUM SERPL-MCNC: 8.6 MG/DL — SIGNIFICANT CHANGE UP (ref 8.5–10.1)
CALCIUM SERPL-MCNC: 8.7 MG/DL — SIGNIFICANT CHANGE UP (ref 8.5–10.1)
CALCIUM SERPL-MCNC: 8.7 MG/DL — SIGNIFICANT CHANGE UP (ref 8.5–10.1)
CALCIUM SERPL-MCNC: 9.1 MG/DL — SIGNIFICANT CHANGE UP (ref 8.5–10.1)
CALCIUM SERPL-MCNC: 9.3 MG/DL — SIGNIFICANT CHANGE UP (ref 8.5–10.1)
CHLORIDE SERPL-SCNC: 103 MMOL/L — SIGNIFICANT CHANGE UP (ref 96–108)
CHLORIDE SERPL-SCNC: 105 MMOL/L — SIGNIFICANT CHANGE UP (ref 96–108)
CHLORIDE SERPL-SCNC: 106 MMOL/L — SIGNIFICANT CHANGE UP (ref 96–108)
CHLORIDE SERPL-SCNC: 106 MMOL/L — SIGNIFICANT CHANGE UP (ref 96–108)
CHLORIDE SERPL-SCNC: 107 MMOL/L — SIGNIFICANT CHANGE UP (ref 96–108)
CHLORIDE SERPL-SCNC: 107 MMOL/L — SIGNIFICANT CHANGE UP (ref 96–108)
CHLORIDE SERPL-SCNC: 108 MMOL/L — SIGNIFICANT CHANGE UP (ref 96–108)
CHLORIDE SERPL-SCNC: 109 MMOL/L — HIGH (ref 96–108)
CHLORIDE SERPL-SCNC: 109 MMOL/L — HIGH (ref 96–108)
CK SERPL-CCNC: 90 U/L — SIGNIFICANT CHANGE UP (ref 26–192)
CO2 SERPL-SCNC: 28 MMOL/L — SIGNIFICANT CHANGE UP (ref 22–31)
CO2 SERPL-SCNC: 29 MMOL/L — SIGNIFICANT CHANGE UP (ref 22–31)
CO2 SERPL-SCNC: 30 MMOL/L — SIGNIFICANT CHANGE UP (ref 22–31)
CO2 SERPL-SCNC: 31 MMOL/L — SIGNIFICANT CHANGE UP (ref 22–31)
COLOR SPEC: SIGNIFICANT CHANGE UP
CREAT SERPL-MCNC: 0.9 MG/DL — SIGNIFICANT CHANGE UP (ref 0.5–1.3)
CREAT SERPL-MCNC: 0.97 MG/DL — SIGNIFICANT CHANGE UP (ref 0.5–1.3)
CREAT SERPL-MCNC: 0.97 MG/DL — SIGNIFICANT CHANGE UP (ref 0.5–1.3)
CREAT SERPL-MCNC: 1 MG/DL — SIGNIFICANT CHANGE UP (ref 0.5–1.3)
CREAT SERPL-MCNC: 1.1 MG/DL — SIGNIFICANT CHANGE UP (ref 0.5–1.3)
CREAT SERPL-MCNC: 1.1 MG/DL — SIGNIFICANT CHANGE UP (ref 0.5–1.3)
CREAT SERPL-MCNC: 1.3 MG/DL — SIGNIFICANT CHANGE UP (ref 0.5–1.3)
CULTURE RESULTS: SIGNIFICANT CHANGE UP
DIFF PNL FLD: ABNORMAL
EBV DNA SERPL NAA+PROBE-ACNC: SIGNIFICANT CHANGE UP IU/ML
EBV EA AB SER IA-ACNC: 8.1 U/ML — SIGNIFICANT CHANGE UP
EBV EA AB TITR SER IF: POSITIVE
EBV EA IGG SER-ACNC: NEGATIVE — SIGNIFICANT CHANGE UP
EBV NA IGG SER IA-ACNC: >600 U/ML — HIGH
EBV PATRN SPEC IB-IMP: SIGNIFICANT CHANGE UP
EBV VCA IGG AVIDITY SER QL IA: POSITIVE
EBV VCA IGM SER IA-ACNC: 204 U/ML — HIGH
EBV VCA IGM SER IA-ACNC: <10 U/ML — SIGNIFICANT CHANGE UP
EBV VCA IGM TITR FLD: NEGATIVE — SIGNIFICANT CHANGE UP
EBVPCR LOG: SIGNIFICANT CHANGE UP LOG10IU/ML
EGFR: 40 ML/MIN/1.73M2 — LOW
EGFR: 48 ML/MIN/1.73M2 — LOW
EGFR: 48 ML/MIN/1.73M2 — LOW
EGFR: 54 ML/MIN/1.73M2 — LOW
EGFR: 56 ML/MIN/1.73M2 — LOW
EGFR: 56 ML/MIN/1.73M2 — LOW
EGFR: 61 ML/MIN/1.73M2 — SIGNIFICANT CHANGE UP
EOSINOPHIL # BLD AUTO: 0 K/UL — SIGNIFICANT CHANGE UP (ref 0–0.5)
EOSINOPHIL # BLD AUTO: 0 K/UL — SIGNIFICANT CHANGE UP (ref 0–0.5)
EOSINOPHIL # BLD AUTO: 0.03 K/UL — SIGNIFICANT CHANGE UP (ref 0–0.5)
EOSINOPHIL # BLD AUTO: 0.04 K/UL — SIGNIFICANT CHANGE UP (ref 0–0.5)
EOSINOPHIL # BLD AUTO: 0.09 K/UL — SIGNIFICANT CHANGE UP (ref 0–0.5)
EOSINOPHIL # BLD AUTO: 0.1 K/UL — SIGNIFICANT CHANGE UP (ref 0–0.5)
EOSINOPHIL # BLD AUTO: 0.12 K/UL — SIGNIFICANT CHANGE UP (ref 0–0.5)
EOSINOPHIL # BLD AUTO: 0.14 K/UL — SIGNIFICANT CHANGE UP (ref 0–0.5)
EOSINOPHIL # BLD AUTO: 0.25 K/UL — SIGNIFICANT CHANGE UP (ref 0–0.5)
EOSINOPHIL NFR BLD AUTO: 0 % — SIGNIFICANT CHANGE UP (ref 0–6)
EOSINOPHIL NFR BLD AUTO: 0 % — SIGNIFICANT CHANGE UP (ref 0–6)
EOSINOPHIL NFR BLD AUTO: 0.2 % — SIGNIFICANT CHANGE UP (ref 0–6)
EOSINOPHIL NFR BLD AUTO: 0.3 % — SIGNIFICANT CHANGE UP (ref 0–6)
EOSINOPHIL NFR BLD AUTO: 0.5 % — SIGNIFICANT CHANGE UP (ref 0–6)
EOSINOPHIL NFR BLD AUTO: 0.6 % — SIGNIFICANT CHANGE UP (ref 0–6)
EOSINOPHIL NFR BLD AUTO: 0.9 % — SIGNIFICANT CHANGE UP (ref 0–6)
EOSINOPHIL NFR BLD AUTO: 1 % — SIGNIFICANT CHANGE UP (ref 0–6)
EOSINOPHIL NFR BLD AUTO: 2 % — SIGNIFICANT CHANGE UP (ref 0–6)
FERRITIN SERPL-MCNC: 643 NG/ML — HIGH (ref 13–330)
FOLATE SERPL-MCNC: 19.4 NG/ML — SIGNIFICANT CHANGE UP
GAS PNL BLDA: SIGNIFICANT CHANGE UP
GAS PNL BLDA: SIGNIFICANT CHANGE UP
GLUCOSE SERPL-MCNC: 104 MG/DL — HIGH (ref 70–99)
GLUCOSE SERPL-MCNC: 113 MG/DL — HIGH (ref 70–99)
GLUCOSE SERPL-MCNC: 114 MG/DL — HIGH (ref 70–99)
GLUCOSE SERPL-MCNC: 118 MG/DL — HIGH (ref 70–99)
GLUCOSE SERPL-MCNC: 120 MG/DL — HIGH (ref 70–99)
GLUCOSE SERPL-MCNC: 122 MG/DL — HIGH (ref 70–99)
GLUCOSE SERPL-MCNC: 130 MG/DL — HIGH (ref 70–99)
GLUCOSE SERPL-MCNC: 130 MG/DL — HIGH (ref 70–99)
GLUCOSE SERPL-MCNC: 144 MG/DL — HIGH (ref 70–99)
GLUCOSE UR QL: NEGATIVE MG/DL — SIGNIFICANT CHANGE UP
HCO3 BLDA-SCNC: 28 MMOL/L — SIGNIFICANT CHANGE UP (ref 21–28)
HCO3 BLDA-SCNC: 28 MMOL/L — SIGNIFICANT CHANGE UP (ref 21–28)
HCT VFR BLD CALC: 23.9 % — LOW (ref 34.5–45)
HCT VFR BLD CALC: 24.8 % — LOW (ref 34.5–45)
HCT VFR BLD CALC: 24.8 % — LOW (ref 34.5–45)
HCT VFR BLD CALC: 25 % — LOW (ref 34.5–45)
HCT VFR BLD CALC: 26 % — LOW (ref 34.5–45)
HCT VFR BLD CALC: 28.6 % — LOW (ref 34.5–45)
HCT VFR BLD CALC: 29.1 % — LOW (ref 34.5–45)
HCT VFR BLD CALC: 29.2 % — LOW (ref 34.5–45)
HCT VFR BLD CALC: 29.3 % — LOW (ref 34.5–45)
HCT VFR BLD CALC: 31.7 % — LOW (ref 34.5–45)
HCT VFR BLD CALC: 33.5 % — LOW (ref 34.5–45)
HGB BLD-MCNC: 10.4 G/DL — LOW (ref 11.5–15.5)
HGB BLD-MCNC: 10.4 G/DL — LOW (ref 11.5–15.5)
HGB BLD-MCNC: 7.7 G/DL — LOW (ref 11.5–15.5)
HGB BLD-MCNC: 7.8 G/DL — LOW (ref 11.5–15.5)
HGB BLD-MCNC: 7.8 G/DL — LOW (ref 11.5–15.5)
HGB BLD-MCNC: 7.9 G/DL — LOW (ref 11.5–15.5)
HGB BLD-MCNC: 8.4 G/DL — LOW (ref 11.5–15.5)
HGB BLD-MCNC: 8.9 G/DL — LOW (ref 11.5–15.5)
HGB BLD-MCNC: 9.3 G/DL — LOW (ref 11.5–15.5)
HGB BLD-MCNC: 9.3 G/DL — LOW (ref 11.5–15.5)
HGB BLD-MCNC: 9.6 G/DL — LOW (ref 11.5–15.5)
HOROWITZ INDEX BLDA+IHG-RTO: 100 — SIGNIFICANT CHANGE UP
HOROWITZ INDEX BLDA+IHG-RTO: 50 — SIGNIFICANT CHANGE UP
IMM GRANULOCYTES NFR BLD AUTO: 2.9 % — HIGH (ref 0–0.9)
IMM GRANULOCYTES NFR BLD AUTO: 4 % — HIGH (ref 0–0.9)
IMM GRANULOCYTES NFR BLD AUTO: 4.3 % — HIGH (ref 0–0.9)
IMM GRANULOCYTES NFR BLD AUTO: 5.3 % — HIGH (ref 0–0.9)
IMM GRANULOCYTES NFR BLD AUTO: 5.6 % — HIGH (ref 0–0.9)
IMM GRANULOCYTES NFR BLD AUTO: 5.8 % — HIGH (ref 0–0.9)
IMM GRANULOCYTES NFR BLD AUTO: 5.9 % — HIGH (ref 0–0.9)
INR BLD: 1.12 RATIO — SIGNIFICANT CHANGE UP (ref 0.85–1.18)
IRON SATN MFR SERPL: 19 % — SIGNIFICANT CHANGE UP (ref 14–50)
IRON SATN MFR SERPL: 26 UG/DL — LOW (ref 30–160)
KETONES UR-MCNC: ABNORMAL MG/DL
LACTATE SERPL-SCNC: 1.3 MMOL/L — SIGNIFICANT CHANGE UP (ref 0.7–2)
LACTATE SERPL-SCNC: 2.1 MMOL/L — HIGH (ref 0.7–2)
LEGIONELLA AG UR QL: NEGATIVE — SIGNIFICANT CHANGE UP
LEGIONELLA AG UR QL: NEGATIVE — SIGNIFICANT CHANGE UP
LEUKOCYTE ESTERASE UR-ACNC: ABNORMAL
LIDOCAIN IGE QN: 31 U/L — SIGNIFICANT CHANGE UP (ref 13–75)
LYMPHOCYTES # BLD AUTO: 0.22 K/UL — LOW (ref 1–3.3)
LYMPHOCYTES # BLD AUTO: 0.34 K/UL — LOW (ref 1–3.3)
LYMPHOCYTES # BLD AUTO: 0.4 K/UL — LOW (ref 1–3.3)
LYMPHOCYTES # BLD AUTO: 0.44 K/UL — LOW (ref 1–3.3)
LYMPHOCYTES # BLD AUTO: 0.47 K/UL — LOW (ref 1–3.3)
LYMPHOCYTES # BLD AUTO: 0.57 K/UL — LOW (ref 1–3.3)
LYMPHOCYTES # BLD AUTO: 0.58 K/UL — LOW (ref 1–3.3)
LYMPHOCYTES # BLD AUTO: 0.62 K/UL — LOW (ref 1–3.3)
LYMPHOCYTES # BLD AUTO: 0.63 K/UL — LOW (ref 1–3.3)
LYMPHOCYTES # BLD AUTO: 1 % — LOW (ref 13–44)
LYMPHOCYTES # BLD AUTO: 2.5 % — LOW (ref 13–44)
LYMPHOCYTES # BLD AUTO: 2.8 % — LOW (ref 13–44)
LYMPHOCYTES # BLD AUTO: 3.2 % — LOW (ref 13–44)
LYMPHOCYTES # BLD AUTO: 3.3 % — LOW (ref 13–44)
LYMPHOCYTES # BLD AUTO: 3.3 % — LOW (ref 13–44)
LYMPHOCYTES # BLD AUTO: 3.4 % — LOW (ref 13–44)
LYMPHOCYTES # BLD AUTO: 4.1 % — LOW (ref 13–44)
LYMPHOCYTES # BLD AUTO: 5 % — LOW (ref 13–44)
MAGNESIUM SERPL-MCNC: 2.5 MG/DL — SIGNIFICANT CHANGE UP (ref 1.6–2.6)
MAGNESIUM SERPL-MCNC: 2.6 MG/DL — SIGNIFICANT CHANGE UP (ref 1.6–2.6)
MAGNESIUM SERPL-MCNC: 2.6 MG/DL — SIGNIFICANT CHANGE UP (ref 1.6–2.6)
MAGNESIUM SERPL-MCNC: 2.7 MG/DL — HIGH (ref 1.6–2.6)
MCHC RBC-ENTMCNC: 31 GM/DL — LOW (ref 32–36)
MCHC RBC-ENTMCNC: 31.1 GM/DL — LOW (ref 32–36)
MCHC RBC-ENTMCNC: 31.2 GM/DL — LOW (ref 32–36)
MCHC RBC-ENTMCNC: 31.5 GM/DL — LOW (ref 32–36)
MCHC RBC-ENTMCNC: 31.8 GM/DL — LOW (ref 32–36)
MCHC RBC-ENTMCNC: 31.9 GM/DL — LOW (ref 32–36)
MCHC RBC-ENTMCNC: 32 GM/DL — SIGNIFICANT CHANGE UP (ref 32–36)
MCHC RBC-ENTMCNC: 32.3 GM/DL — SIGNIFICANT CHANGE UP (ref 32–36)
MCHC RBC-ENTMCNC: 32.8 GM/DL — SIGNIFICANT CHANGE UP (ref 32–36)
MCHC RBC-ENTMCNC: 32.8 GM/DL — SIGNIFICANT CHANGE UP (ref 32–36)
MCHC RBC-ENTMCNC: 34.2 PG — HIGH (ref 27–34)
MCHC RBC-ENTMCNC: 34.5 PG — HIGH (ref 27–34)
MCHC RBC-ENTMCNC: 34.7 PG — HIGH (ref 27–34)
MCHC RBC-ENTMCNC: 34.8 PG — HIGH (ref 27–34)
MCHC RBC-ENTMCNC: 34.8 PG — HIGH (ref 27–34)
MCHC RBC-ENTMCNC: 35 PG — HIGH (ref 27–34)
MCHC RBC-ENTMCNC: 35.3 PG — HIGH (ref 27–34)
MCHC RBC-ENTMCNC: 35.5 PG — HIGH (ref 27–34)
MCHC RBC-ENTMCNC: 35.9 PG — HIGH (ref 27–34)
MCHC RBC-ENTMCNC: 36.1 PG — HIGH (ref 27–34)
MCV RBC AUTO: 106.2 FL — HIGH (ref 80–100)
MCV RBC AUTO: 107.4 FL — HIGH (ref 80–100)
MCV RBC AUTO: 109.3 FL — HIGH (ref 80–100)
MCV RBC AUTO: 109.4 FL — HIGH (ref 80–100)
MCV RBC AUTO: 110.1 FL — HIGH (ref 80–100)
MCV RBC AUTO: 110.9 FL — HIGH (ref 80–100)
MCV RBC AUTO: 111.1 FL — HIGH (ref 80–100)
MCV RBC AUTO: 111.7 FL — HIGH (ref 80–100)
MCV RBC AUTO: 112.7 FL — HIGH (ref 80–100)
MCV RBC AUTO: 113.1 FL — HIGH (ref 80–100)
MONOCYTES # BLD AUTO: 0.37 K/UL — SIGNIFICANT CHANGE UP (ref 0–0.9)
MONOCYTES # BLD AUTO: 0.38 K/UL — SIGNIFICANT CHANGE UP (ref 0–0.9)
MONOCYTES # BLD AUTO: 0.44 K/UL — SIGNIFICANT CHANGE UP (ref 0–0.9)
MONOCYTES # BLD AUTO: 0.48 K/UL — SIGNIFICANT CHANGE UP (ref 0–0.9)
MONOCYTES # BLD AUTO: 0.48 K/UL — SIGNIFICANT CHANGE UP (ref 0–0.9)
MONOCYTES # BLD AUTO: 0.59 K/UL — SIGNIFICANT CHANGE UP (ref 0–0.9)
MONOCYTES # BLD AUTO: 0.67 K/UL — SIGNIFICANT CHANGE UP (ref 0–0.9)
MONOCYTES # BLD AUTO: 0.87 K/UL — SIGNIFICANT CHANGE UP (ref 0–0.9)
MONOCYTES # BLD AUTO: 1.34 K/UL — HIGH (ref 0–0.9)
MONOCYTES NFR BLD AUTO: 2.5 % — SIGNIFICANT CHANGE UP (ref 2–14)
MONOCYTES NFR BLD AUTO: 2.7 % — SIGNIFICANT CHANGE UP (ref 2–14)
MONOCYTES NFR BLD AUTO: 3 % — SIGNIFICANT CHANGE UP (ref 2–14)
MONOCYTES NFR BLD AUTO: 3.3 % — SIGNIFICANT CHANGE UP (ref 2–14)
MONOCYTES NFR BLD AUTO: 3.7 % — SIGNIFICANT CHANGE UP (ref 2–14)
MONOCYTES NFR BLD AUTO: 3.9 % — SIGNIFICANT CHANGE UP (ref 2–14)
MONOCYTES NFR BLD AUTO: 4.7 % — SIGNIFICANT CHANGE UP (ref 2–14)
MONOCYTES NFR BLD AUTO: 5 % — SIGNIFICANT CHANGE UP (ref 2–14)
MONOCYTES NFR BLD AUTO: 6 % — SIGNIFICANT CHANGE UP (ref 2–14)
MRSA PCR RESULT.: SIGNIFICANT CHANGE UP
NEUTROPHILS # BLD AUTO: 10.29 K/UL — HIGH (ref 1.8–7.4)
NEUTROPHILS # BLD AUTO: 10.72 K/UL — HIGH (ref 1.8–7.4)
NEUTROPHILS # BLD AUTO: 10.95 K/UL — HIGH (ref 1.8–7.4)
NEUTROPHILS # BLD AUTO: 11.66 K/UL — HIGH (ref 1.8–7.4)
NEUTROPHILS # BLD AUTO: 13.27 K/UL — HIGH (ref 1.8–7.4)
NEUTROPHILS # BLD AUTO: 15.39 K/UL — HIGH (ref 1.8–7.4)
NEUTROPHILS # BLD AUTO: 15.6 K/UL — HIGH (ref 1.8–7.4)
NEUTROPHILS # BLD AUTO: 16.17 K/UL — HIGH (ref 1.8–7.4)
NEUTROPHILS # BLD AUTO: 20.37 K/UL — HIGH (ref 1.8–7.4)
NEUTROPHILS NFR BLD AUTO: 85 % — HIGH (ref 43–77)
NEUTROPHILS NFR BLD AUTO: 86 % — HIGH (ref 43–77)
NEUTROPHILS NFR BLD AUTO: 86.1 % — HIGH (ref 43–77)
NEUTROPHILS NFR BLD AUTO: 86.6 % — HIGH (ref 43–77)
NEUTROPHILS NFR BLD AUTO: 86.8 % — HIGH (ref 43–77)
NEUTROPHILS NFR BLD AUTO: 87.2 % — HIGH (ref 43–77)
NEUTROPHILS NFR BLD AUTO: 87.3 % — HIGH (ref 43–77)
NEUTROPHILS NFR BLD AUTO: 87.7 % — HIGH (ref 43–77)
NEUTROPHILS NFR BLD AUTO: 89.7 % — HIGH (ref 43–77)
NITRITE UR-MCNC: NEGATIVE — SIGNIFICANT CHANGE UP
NRBC # BLD: 0 /100 WBCS — SIGNIFICANT CHANGE UP (ref 0–0)
NRBC # BLD: SIGNIFICANT CHANGE UP /100 WBCS (ref 0–0)
NRBC # BLD: SIGNIFICANT CHANGE UP /100 WBCS (ref 0–0)
NT-PROBNP SERPL-SCNC: 5349 PG/ML — HIGH (ref 0–450)
OB PNL STL: NEGATIVE — SIGNIFICANT CHANGE UP
PCO2 BLDA: 42 MMHG — HIGH (ref 32–35)
PCO2 BLDA: 44 MMHG — HIGH (ref 32–35)
PH BLDA: 7.42 — SIGNIFICANT CHANGE UP (ref 7.35–7.45)
PH BLDA: 7.44 — SIGNIFICANT CHANGE UP (ref 7.35–7.45)
PH UR: 5.5 — SIGNIFICANT CHANGE UP (ref 5–8)
PHOSPHATE SERPL-MCNC: 3.2 MG/DL — SIGNIFICANT CHANGE UP (ref 2.5–4.5)
PHOSPHATE SERPL-MCNC: 3.3 MG/DL — SIGNIFICANT CHANGE UP (ref 2.5–4.5)
PHOSPHATE SERPL-MCNC: 3.3 MG/DL — SIGNIFICANT CHANGE UP (ref 2.5–4.5)
PHOSPHATE SERPL-MCNC: 3.5 MG/DL — SIGNIFICANT CHANGE UP (ref 2.5–4.5)
PHOSPHATE SERPL-MCNC: 3.9 MG/DL — SIGNIFICANT CHANGE UP (ref 2.5–4.5)
PLATELET # BLD AUTO: 144 K/UL — LOW (ref 150–400)
PLATELET # BLD AUTO: 145 K/UL — LOW (ref 150–400)
PLATELET # BLD AUTO: 150 K/UL — SIGNIFICANT CHANGE UP (ref 150–400)
PLATELET # BLD AUTO: 164 K/UL — SIGNIFICANT CHANGE UP (ref 150–400)
PLATELET # BLD AUTO: 172 K/UL — SIGNIFICANT CHANGE UP (ref 150–400)
PLATELET # BLD AUTO: 177 K/UL — SIGNIFICANT CHANGE UP (ref 150–400)
PLATELET # BLD AUTO: 180 K/UL — SIGNIFICANT CHANGE UP (ref 150–400)
PLATELET # BLD AUTO: 183 K/UL — SIGNIFICANT CHANGE UP (ref 150–400)
PLATELET # BLD AUTO: 183 K/UL — SIGNIFICANT CHANGE UP (ref 150–400)
PLATELET # BLD AUTO: 247 K/UL — SIGNIFICANT CHANGE UP (ref 150–400)
PO2 BLDA: 116 MMHG — HIGH (ref 83–108)
PO2 BLDA: 83 MMHG — SIGNIFICANT CHANGE UP (ref 83–108)
POTASSIUM SERPL-MCNC: 3.9 MMOL/L — SIGNIFICANT CHANGE UP (ref 3.5–5.3)
POTASSIUM SERPL-MCNC: 4 MMOL/L — SIGNIFICANT CHANGE UP (ref 3.5–5.3)
POTASSIUM SERPL-MCNC: 4.1 MMOL/L — SIGNIFICANT CHANGE UP (ref 3.5–5.3)
POTASSIUM SERPL-MCNC: 4.1 MMOL/L — SIGNIFICANT CHANGE UP (ref 3.5–5.3)
POTASSIUM SERPL-MCNC: 4.2 MMOL/L — SIGNIFICANT CHANGE UP (ref 3.5–5.3)
POTASSIUM SERPL-MCNC: 4.4 MMOL/L — SIGNIFICANT CHANGE UP (ref 3.5–5.3)
POTASSIUM SERPL-MCNC: 4.7 MMOL/L — SIGNIFICANT CHANGE UP (ref 3.5–5.3)
POTASSIUM SERPL-SCNC: 3.9 MMOL/L — SIGNIFICANT CHANGE UP (ref 3.5–5.3)
POTASSIUM SERPL-SCNC: 4 MMOL/L — SIGNIFICANT CHANGE UP (ref 3.5–5.3)
POTASSIUM SERPL-SCNC: 4.1 MMOL/L — SIGNIFICANT CHANGE UP (ref 3.5–5.3)
POTASSIUM SERPL-SCNC: 4.1 MMOL/L — SIGNIFICANT CHANGE UP (ref 3.5–5.3)
POTASSIUM SERPL-SCNC: 4.2 MMOL/L — SIGNIFICANT CHANGE UP (ref 3.5–5.3)
POTASSIUM SERPL-SCNC: 4.4 MMOL/L — SIGNIFICANT CHANGE UP (ref 3.5–5.3)
POTASSIUM SERPL-SCNC: 4.7 MMOL/L — SIGNIFICANT CHANGE UP (ref 3.5–5.3)
PROCALCITONIN SERPL-MCNC: 0.32 NG/ML — HIGH
PROCALCITONIN SERPL-MCNC: 0.63 NG/ML — HIGH
PROT SERPL-MCNC: 5.1 G/DL — LOW (ref 6–8.3)
PROT SERPL-MCNC: 5.4 G/DL — LOW (ref 6–8.3)
PROT SERPL-MCNC: 5.6 G/DL — LOW (ref 6–8.3)
PROT SERPL-MCNC: 5.6 G/DL — LOW (ref 6–8.3)
PROT SERPL-MCNC: 5.7 G/DL — LOW (ref 6–8.3)
PROT SERPL-MCNC: 5.8 G/DL — LOW (ref 6–8.3)
PROT SERPL-MCNC: 6.7 G/DL — SIGNIFICANT CHANGE UP (ref 6–8.3)
PROT SERPL-MCNC: 6.9 G/DL — SIGNIFICANT CHANGE UP (ref 6–8.3)
PROT UR-MCNC: 100 MG/DL
PROTHROM AB SERPL-ACNC: 13.1 SEC — HIGH (ref 9.5–13)
RAPID RVP RESULT: SIGNIFICANT CHANGE UP
RBC # BLD: 2.2 M/UL — LOW (ref 3.8–5.2)
RBC # BLD: 2.21 M/UL — LOW (ref 3.8–5.2)
RBC # BLD: 2.27 M/UL — LOW (ref 3.8–5.2)
RBC # BLD: 2.27 M/UL — LOW (ref 3.8–5.2)
RBC # BLD: 2.34 M/UL — LOW (ref 3.8–5.2)
RBC # BLD: 2.58 M/UL — LOW (ref 3.8–5.2)
RBC # BLD: 2.66 M/UL — LOW (ref 3.8–5.2)
RBC # BLD: 2.72 M/UL — LOW (ref 3.8–5.2)
RBC # BLD: 2.76 M/UL — LOW (ref 3.8–5.2)
RBC # BLD: 2.88 M/UL — LOW (ref 3.8–5.2)
RBC # BLD: 3 M/UL — LOW (ref 3.8–5.2)
RBC # FLD: 14.8 % — HIGH (ref 10.3–14.5)
RBC # FLD: 14.9 % — HIGH (ref 10.3–14.5)
RBC # FLD: 15 % — HIGH (ref 10.3–14.5)
RBC # FLD: 15.2 % — HIGH (ref 10.3–14.5)
RBC # FLD: 15.2 % — HIGH (ref 10.3–14.5)
RBC # FLD: 17.4 % — HIGH (ref 10.3–14.5)
RBC # FLD: 17.6 % — HIGH (ref 10.3–14.5)
RBC # FLD: 18.2 % — HIGH (ref 10.3–14.5)
RBC # FLD: 18.8 % — HIGH (ref 10.3–14.5)
RBC # FLD: 19 % — HIGH (ref 10.3–14.5)
RETICS #: 30.9 K/UL — SIGNIFICANT CHANGE UP (ref 25–125)
RETICS/RBC NFR: 1.4 % — SIGNIFICANT CHANGE UP (ref 0.5–2.5)
S AUREUS DNA NOSE QL NAA+PROBE: DETECTED
S PNEUM AG UR QL: NEGATIVE — SIGNIFICANT CHANGE UP
S PNEUM AG UR QL: POSITIVE
SAO2 % BLDA: 97.4 % — SIGNIFICANT CHANGE UP (ref 94–98)
SAO2 % BLDA: 99.2 % — HIGH (ref 94–98)
SARS-COV-2 RNA SPEC QL NAA+PROBE: SIGNIFICANT CHANGE UP
SODIUM SERPL-SCNC: 138 MMOL/L — SIGNIFICANT CHANGE UP (ref 135–145)
SODIUM SERPL-SCNC: 139 MMOL/L — SIGNIFICANT CHANGE UP (ref 135–145)
SODIUM SERPL-SCNC: 140 MMOL/L — SIGNIFICANT CHANGE UP (ref 135–145)
SODIUM SERPL-SCNC: 140 MMOL/L — SIGNIFICANT CHANGE UP (ref 135–145)
SODIUM SERPL-SCNC: 141 MMOL/L — SIGNIFICANT CHANGE UP (ref 135–145)
SODIUM SERPL-SCNC: 141 MMOL/L — SIGNIFICANT CHANGE UP (ref 135–145)
SODIUM SERPL-SCNC: 142 MMOL/L — SIGNIFICANT CHANGE UP (ref 135–145)
SP GR SPEC: 1.03 — SIGNIFICANT CHANGE UP (ref 1–1.03)
SPECIMEN SOURCE: SIGNIFICANT CHANGE UP
TIBC SERPL-MCNC: 142 UG/DL — LOW (ref 220–430)
TROPONIN I, HIGH SENSITIVITY RESULT: 21.3 NG/L — SIGNIFICANT CHANGE UP
TROPONIN I, HIGH SENSITIVITY RESULT: 38.6 NG/L — SIGNIFICANT CHANGE UP
UIBC SERPL-MCNC: 116 UG/DL — SIGNIFICANT CHANGE UP (ref 110–370)
UROBILINOGEN FLD QL: 1 MG/DL — SIGNIFICANT CHANGE UP (ref 0.2–1)
VIT B12 SERPL-MCNC: >2000 PG/ML — HIGH (ref 232–1245)
WBC # BLD: 11.94 K/UL — HIGH (ref 3.8–10.5)
WBC # BLD: 12.22 K/UL — HIGH (ref 3.8–10.5)
WBC # BLD: 12.46 K/UL — HIGH (ref 3.8–10.5)
WBC # BLD: 13.37 K/UL — HIGH (ref 3.8–10.5)
WBC # BLD: 14.04 K/UL — HIGH (ref 3.8–10.5)
WBC # BLD: 15.33 K/UL — HIGH (ref 3.8–10.5)
WBC # BLD: 17.65 K/UL — HIGH (ref 3.8–10.5)
WBC # BLD: 17.96 K/UL — HIGH (ref 3.8–10.5)
WBC # BLD: 18.45 K/UL — HIGH (ref 3.8–10.5)
WBC # BLD: 22.39 K/UL — HIGH (ref 3.8–10.5)
WBC # FLD AUTO: 11.94 K/UL — HIGH (ref 3.8–10.5)
WBC # FLD AUTO: 12.22 K/UL — HIGH (ref 3.8–10.5)
WBC # FLD AUTO: 12.46 K/UL — HIGH (ref 3.8–10.5)
WBC # FLD AUTO: 13.37 K/UL — HIGH (ref 3.8–10.5)
WBC # FLD AUTO: 14.04 K/UL — HIGH (ref 3.8–10.5)
WBC # FLD AUTO: 15.33 K/UL — HIGH (ref 3.8–10.5)
WBC # FLD AUTO: 17.65 K/UL — HIGH (ref 3.8–10.5)
WBC # FLD AUTO: 17.96 K/UL — HIGH (ref 3.8–10.5)
WBC # FLD AUTO: 18.45 K/UL — HIGH (ref 3.8–10.5)
WBC # FLD AUTO: 22.39 K/UL — HIGH (ref 3.8–10.5)

## 2024-01-01 PROCEDURE — 74176 CT ABD & PELVIS W/O CONTRAST: CPT | Mod: MC

## 2024-01-01 PROCEDURE — 74176 CT ABD & PELVIS W/O CONTRAST: CPT | Mod: 26,MC

## 2024-01-01 PROCEDURE — 99232 SBSQ HOSP IP/OBS MODERATE 35: CPT

## 2024-01-01 PROCEDURE — 96374 THER/PROPH/DIAG INJ IV PUSH: CPT

## 2024-01-01 PROCEDURE — 99233 SBSQ HOSP IP/OBS HIGH 50: CPT | Mod: GC

## 2024-01-01 PROCEDURE — 83880 ASSAY OF NATRIURETIC PEPTIDE: CPT

## 2024-01-01 PROCEDURE — 36415 COLL VENOUS BLD VENIPUNCTURE: CPT

## 2024-01-01 PROCEDURE — 73562 X-RAY EXAM OF KNEE 3: CPT | Mod: 26,RT

## 2024-01-01 PROCEDURE — 99232 SBSQ HOSP IP/OBS MODERATE 35: CPT | Mod: GC

## 2024-01-01 PROCEDURE — 82728 ASSAY OF FERRITIN: CPT

## 2024-01-01 PROCEDURE — 87040 BLOOD CULTURE FOR BACTERIA: CPT

## 2024-01-01 PROCEDURE — 86900 BLOOD TYPING SEROLOGIC ABO: CPT

## 2024-01-01 PROCEDURE — 94640 AIRWAY INHALATION TREATMENT: CPT

## 2024-01-01 PROCEDURE — 71250 CT THORAX DX C-: CPT | Mod: MC

## 2024-01-01 PROCEDURE — 83550 IRON BINDING TEST: CPT

## 2024-01-01 PROCEDURE — 86665 EPSTEIN-BARR CAPSID VCA: CPT

## 2024-01-01 PROCEDURE — 0225U NFCT DS DNA&RNA 21 SARSCOV2: CPT

## 2024-01-01 PROCEDURE — 71275 CT ANGIOGRAPHY CHEST: CPT | Mod: 26

## 2024-01-01 PROCEDURE — 71045 X-RAY EXAM CHEST 1 VIEW: CPT

## 2024-01-01 PROCEDURE — 73060 X-RAY EXAM OF HUMERUS: CPT | Mod: 26,RT

## 2024-01-01 PROCEDURE — 82803 BLOOD GASES ANY COMBINATION: CPT

## 2024-01-01 PROCEDURE — 84100 ASSAY OF PHOSPHORUS: CPT

## 2024-01-01 PROCEDURE — 85730 THROMBOPLASTIN TIME PARTIAL: CPT

## 2024-01-01 PROCEDURE — 85610 PROTHROMBIN TIME: CPT

## 2024-01-01 PROCEDURE — 97110 THERAPEUTIC EXERCISES: CPT

## 2024-01-01 PROCEDURE — 36430 TRANSFUSION BLD/BLD COMPNT: CPT

## 2024-01-01 PROCEDURE — 99223 1ST HOSP IP/OBS HIGH 75: CPT | Mod: GC

## 2024-01-01 PROCEDURE — 71045 X-RAY EXAM CHEST 1 VIEW: CPT | Mod: 26

## 2024-01-01 PROCEDURE — 85025 COMPLETE CBC W/AUTO DIFF WBC: CPT

## 2024-01-01 PROCEDURE — 86663 EPSTEIN-BARR ANTIBODY: CPT

## 2024-01-01 PROCEDURE — 80053 COMPREHEN METABOLIC PANEL: CPT

## 2024-01-01 PROCEDURE — 99233 SBSQ HOSP IP/OBS HIGH 50: CPT

## 2024-01-01 PROCEDURE — 73060 X-RAY EXAM OF HUMERUS: CPT

## 2024-01-01 PROCEDURE — 87899 AGENT NOS ASSAY W/OPTIC: CPT

## 2024-01-01 PROCEDURE — 99497 ADVNCD CARE PLAN 30 MIN: CPT | Mod: 25

## 2024-01-01 PROCEDURE — 83735 ASSAY OF MAGNESIUM: CPT

## 2024-01-01 PROCEDURE — 85014 HEMATOCRIT: CPT

## 2024-01-01 PROCEDURE — 94760 N-INVAS EAR/PLS OXIMETRY 1: CPT

## 2024-01-01 PROCEDURE — 93306 TTE W/DOPPLER COMPLETE: CPT | Mod: 26

## 2024-01-01 PROCEDURE — 70450 CT HEAD/BRAIN W/O DYE: CPT | Mod: MC

## 2024-01-01 PROCEDURE — 72125 CT NECK SPINE W/O DYE: CPT | Mod: 26,MC

## 2024-01-01 PROCEDURE — 87799 DETECT AGENT NOS DNA QUANT: CPT

## 2024-01-01 PROCEDURE — 99222 1ST HOSP IP/OBS MODERATE 55: CPT

## 2024-01-01 PROCEDURE — 93010 ELECTROCARDIOGRAM REPORT: CPT

## 2024-01-01 PROCEDURE — 83605 ASSAY OF LACTIC ACID: CPT

## 2024-01-01 PROCEDURE — 87449 NOS EACH ORGANISM AG IA: CPT

## 2024-01-01 PROCEDURE — 93005 ELECTROCARDIOGRAM TRACING: CPT

## 2024-01-01 PROCEDURE — 99498 ADVNCD CARE PLAN ADDL 30 MIN: CPT | Mod: 25

## 2024-01-01 PROCEDURE — 83690 ASSAY OF LIPASE: CPT

## 2024-01-01 PROCEDURE — 99285 EMERGENCY DEPT VISIT HI MDM: CPT | Mod: 25

## 2024-01-01 PROCEDURE — 86850 RBC ANTIBODY SCREEN: CPT

## 2024-01-01 PROCEDURE — 71250 CT THORAX DX C-: CPT | Mod: 26,MC

## 2024-01-01 PROCEDURE — 87641 MR-STAPH DNA AMP PROBE: CPT

## 2024-01-01 PROCEDURE — 73030 X-RAY EXAM OF SHOULDER: CPT

## 2024-01-01 PROCEDURE — 99239 HOSP IP/OBS DSCHRG MGMT >30: CPT

## 2024-01-01 PROCEDURE — 86901 BLOOD TYPING SEROLOGIC RH(D): CPT

## 2024-01-01 PROCEDURE — 86664 EPSTEIN-BARR NUCLEAR ANTIGEN: CPT

## 2024-01-01 PROCEDURE — 96375 TX/PRO/DX INJ NEW DRUG ADDON: CPT

## 2024-01-01 PROCEDURE — 82607 VITAMIN B-12: CPT

## 2024-01-01 PROCEDURE — 85018 HEMOGLOBIN: CPT

## 2024-01-01 PROCEDURE — 73562 X-RAY EXAM OF KNEE 3: CPT

## 2024-01-01 PROCEDURE — 99223 1ST HOSP IP/OBS HIGH 75: CPT

## 2024-01-01 PROCEDURE — 85045 AUTOMATED RETICULOCYTE COUNT: CPT

## 2024-01-01 PROCEDURE — 86923 COMPATIBILITY TEST ELECTRIC: CPT

## 2024-01-01 PROCEDURE — 73030 X-RAY EXAM OF SHOULDER: CPT | Mod: 26,RT

## 2024-01-01 PROCEDURE — 87640 STAPH A DNA AMP PROBE: CPT

## 2024-01-01 PROCEDURE — 82746 ASSAY OF FOLIC ACID SERUM: CPT

## 2024-01-01 PROCEDURE — 81001 URINALYSIS AUTO W/SCOPE: CPT

## 2024-01-01 PROCEDURE — 71275 CT ANGIOGRAPHY CHEST: CPT | Mod: MC

## 2024-01-01 PROCEDURE — 72125 CT NECK SPINE W/O DYE: CPT | Mod: MC

## 2024-01-01 PROCEDURE — 93306 TTE W/DOPPLER COMPLETE: CPT

## 2024-01-01 PROCEDURE — 85027 COMPLETE CBC AUTOMATED: CPT

## 2024-01-01 PROCEDURE — P9016: CPT

## 2024-01-01 PROCEDURE — 84145 PROCALCITONIN (PCT): CPT

## 2024-01-01 PROCEDURE — 80048 BASIC METABOLIC PNL TOTAL CA: CPT

## 2024-01-01 PROCEDURE — 70450 CT HEAD/BRAIN W/O DYE: CPT | Mod: 26,MC

## 2024-01-01 PROCEDURE — 99285 EMERGENCY DEPT VISIT HI MDM: CPT

## 2024-01-01 PROCEDURE — 82550 ASSAY OF CK (CPK): CPT

## 2024-01-01 PROCEDURE — 83540 ASSAY OF IRON: CPT

## 2024-01-01 PROCEDURE — 82272 OCCULT BLD FECES 1-3 TESTS: CPT

## 2024-01-01 PROCEDURE — 97162 PT EVAL MOD COMPLEX 30 MIN: CPT

## 2024-01-01 PROCEDURE — 84484 ASSAY OF TROPONIN QUANT: CPT

## 2024-01-01 PROCEDURE — 87086 URINE CULTURE/COLONY COUNT: CPT

## 2024-01-01 PROCEDURE — 82962 GLUCOSE BLOOD TEST: CPT

## 2024-01-01 RX ORDER — IPRATROPIUM/ALBUTEROL SULFATE 18-103MCG
3 AEROSOL WITH ADAPTER (GRAM) INHALATION ONCE
Refills: 0 | Status: COMPLETED | OUTPATIENT
Start: 2024-01-01 | End: 2024-01-01

## 2024-01-01 RX ORDER — SODIUM CHLORIDE 9 MG/ML
500 INJECTION INTRAMUSCULAR; INTRAVENOUS; SUBCUTANEOUS ONCE
Refills: 0 | Status: COMPLETED | OUTPATIENT
Start: 2024-01-01 | End: 2024-01-01

## 2024-01-01 RX ORDER — MORPHINE SULFATE 50 MG/1
2 CAPSULE, EXTENDED RELEASE ORAL
Refills: 0 | Status: DISCONTINUED | OUTPATIENT
Start: 2024-01-01 | End: 2024-01-01

## 2024-01-01 RX ORDER — PREGABALIN 225 MG/1
1000 CAPSULE ORAL DAILY
Refills: 0 | Status: DISCONTINUED | OUTPATIENT
Start: 2024-01-01 | End: 2024-01-01

## 2024-01-01 RX ORDER — FOLIC ACID 0.8 MG
1 TABLET ORAL
Refills: 0 | DISCHARGE

## 2024-01-01 RX ORDER — FUROSEMIDE 40 MG
10 TABLET ORAL DAILY
Refills: 0 | Status: DISCONTINUED | OUTPATIENT
Start: 2024-01-01 | End: 2024-01-01

## 2024-01-01 RX ORDER — ACETAMINOPHEN 500 MG
750 TABLET ORAL EVERY 6 HOURS
Refills: 0 | Status: COMPLETED | OUTPATIENT
Start: 2024-01-01 | End: 2024-01-01

## 2024-01-01 RX ORDER — FUROSEMIDE 40 MG
40 TABLET ORAL ONCE
Refills: 0 | Status: COMPLETED | OUTPATIENT
Start: 2024-01-01 | End: 2024-01-01

## 2024-01-01 RX ORDER — LIDOCAINE 4 G/100G
1 CREAM TOPICAL DAILY
Refills: 0 | Status: DISCONTINUED | OUTPATIENT
Start: 2024-01-01 | End: 2024-01-01

## 2024-01-01 RX ORDER — AZITHROMYCIN 500 MG/1
500 TABLET, FILM COATED ORAL ONCE
Refills: 0 | Status: COMPLETED | OUTPATIENT
Start: 2024-01-01 | End: 2024-01-01

## 2024-01-01 RX ORDER — TRAMADOL HYDROCHLORIDE 50 MG/1
50 TABLET ORAL EVERY 6 HOURS
Refills: 0 | Status: DISCONTINUED | OUTPATIENT
Start: 2024-01-01 | End: 2024-01-01

## 2024-01-01 RX ORDER — DONEPEZIL HYDROCHLORIDE 10 MG/1
10 TABLET, FILM COATED ORAL AT BEDTIME
Refills: 0 | Status: DISCONTINUED | OUTPATIENT
Start: 2024-01-01 | End: 2024-01-01

## 2024-01-01 RX ORDER — MEMANTINE HYDROCHLORIDE 10 MG/1
0 TABLET ORAL
Qty: 0 | Refills: 0 | DISCHARGE

## 2024-01-01 RX ORDER — CEFTRIAXONE 500 MG/1
1000 INJECTION, POWDER, FOR SOLUTION INTRAMUSCULAR; INTRAVENOUS ONCE
Refills: 0 | Status: COMPLETED | OUTPATIENT
Start: 2024-01-01 | End: 2024-01-01

## 2024-01-01 RX ORDER — SODIUM CHLORIDE 9 MG/ML
250 INJECTION, SOLUTION INTRAVENOUS
Refills: 0 | Status: COMPLETED | OUTPATIENT
Start: 2024-01-01 | End: 2024-01-01

## 2024-01-01 RX ORDER — ACETAMINOPHEN 500 MG
975 TABLET ORAL EVERY 8 HOURS
Refills: 0 | Status: DISCONTINUED | OUTPATIENT
Start: 2024-01-01 | End: 2024-01-01

## 2024-01-01 RX ORDER — CEFTRIAXONE 500 MG/1
1000 INJECTION, POWDER, FOR SOLUTION INTRAMUSCULAR; INTRAVENOUS EVERY 24 HOURS
Refills: 0 | Status: COMPLETED | OUTPATIENT
Start: 2024-01-01 | End: 2024-01-01

## 2024-01-01 RX ORDER — MEMANTINE HYDROCHLORIDE 10 MG/1
1 TABLET ORAL
Refills: 0 | DISCHARGE

## 2024-01-01 RX ORDER — CEFTRIAXONE 500 MG/1
1000 INJECTION, POWDER, FOR SOLUTION INTRAMUSCULAR; INTRAVENOUS EVERY 24 HOURS
Refills: 0 | Status: DISCONTINUED | OUTPATIENT
Start: 2024-01-01 | End: 2024-01-01

## 2024-01-01 RX ORDER — ACETAMINOPHEN 500 MG
650 TABLET ORAL EVERY 6 HOURS
Refills: 0 | Status: DISCONTINUED | OUTPATIENT
Start: 2024-01-01 | End: 2024-01-01

## 2024-01-01 RX ORDER — MEMANTINE HYDROCHLORIDE 10 MG/1
10 TABLET ORAL
Refills: 0 | Status: DISCONTINUED | OUTPATIENT
Start: 2024-01-01 | End: 2024-01-01

## 2024-01-01 RX ORDER — SODIUM CHLORIDE 9 MG/ML
1000 INJECTION INTRAMUSCULAR; INTRAVENOUS; SUBCUTANEOUS
Refills: 0 | Status: DISCONTINUED | OUTPATIENT
Start: 2024-01-01 | End: 2024-01-01

## 2024-01-01 RX ORDER — MORPHINE SULFATE 50 MG/1
1 CAPSULE, EXTENDED RELEASE ORAL
Refills: 0 | Status: DISCONTINUED | OUTPATIENT
Start: 2024-01-01 | End: 2024-01-01

## 2024-01-01 RX ORDER — IRON SUCROSE 20 MG/ML
100 INJECTION, SOLUTION INTRAVENOUS EVERY 24 HOURS
Refills: 0 | Status: COMPLETED | OUTPATIENT
Start: 2024-01-01 | End: 2024-01-01

## 2024-01-01 RX ORDER — FUROSEMIDE 40 MG
1 TABLET ORAL
Refills: 0 | DISCHARGE

## 2024-01-01 RX ORDER — IPRATROPIUM/ALBUTEROL SULFATE 18-103MCG
3 AEROSOL WITH ADAPTER (GRAM) INHALATION EVERY 6 HOURS
Refills: 0 | Status: DISCONTINUED | OUTPATIENT
Start: 2024-01-01 | End: 2024-01-01

## 2024-01-01 RX ORDER — FUROSEMIDE 40 MG
20 TABLET ORAL DAILY
Refills: 0 | Status: DISCONTINUED | OUTPATIENT
Start: 2024-01-01 | End: 2024-01-01

## 2024-01-01 RX ORDER — LIDOCAINE 4 G/100G
1 CREAM TOPICAL EVERY 24 HOURS
Refills: 0 | Status: DISCONTINUED | OUTPATIENT
Start: 2024-01-01 | End: 2024-01-01

## 2024-01-01 RX ORDER — ROBINUL 0.2 MG/ML
0.2 INJECTION INTRAMUSCULAR; INTRAVENOUS EVERY 6 HOURS
Refills: 0 | Status: DISCONTINUED | OUTPATIENT
Start: 2024-01-01 | End: 2024-01-01

## 2024-01-01 RX ORDER — ONDANSETRON 8 MG/1
4 TABLET, FILM COATED ORAL EVERY 8 HOURS
Refills: 0 | Status: DISCONTINUED | OUTPATIENT
Start: 2024-01-01 | End: 2024-01-01

## 2024-01-01 RX ORDER — MIRTAZAPINE 45 MG/1
15 TABLET, ORALLY DISINTEGRATING ORAL AT BEDTIME
Refills: 0 | Status: DISCONTINUED | OUTPATIENT
Start: 2024-01-01 | End: 2024-01-01

## 2024-01-01 RX ORDER — MIRTAZAPINE 45 MG/1
1 TABLET, ORALLY DISINTEGRATING ORAL
Refills: 0 | DISCHARGE

## 2024-01-01 RX ORDER — MIDODRINE HYDROCHLORIDE 2.5 MG/1
5 TABLET ORAL ONCE
Refills: 0 | Status: COMPLETED | OUTPATIENT
Start: 2024-01-01 | End: 2024-01-01

## 2024-01-01 RX ORDER — TRAMADOL HYDROCHLORIDE 50 MG/1
25 TABLET ORAL EVERY 6 HOURS
Refills: 0 | Status: DISCONTINUED | OUTPATIENT
Start: 2024-01-01 | End: 2024-01-01

## 2024-01-01 RX ORDER — MORPHINE SULFATE 50 MG/1
2 CAPSULE, EXTENDED RELEASE ORAL EVERY 4 HOURS
Refills: 0 | Status: DISCONTINUED | OUTPATIENT
Start: 2024-01-01 | End: 2024-01-01

## 2024-01-01 RX ORDER — MUPIROCIN 20 MG/G
1 OINTMENT TOPICAL
Refills: 0 | Status: COMPLETED | OUTPATIENT
Start: 2024-01-01 | End: 2024-01-01

## 2024-01-01 RX ORDER — LANOLIN ALCOHOL/MO/W.PET/CERES
3 CREAM (GRAM) TOPICAL AT BEDTIME
Refills: 0 | Status: DISCONTINUED | OUTPATIENT
Start: 2024-01-01 | End: 2024-01-01

## 2024-01-01 RX ORDER — MORPHINE SULFATE 50 MG/1
2 CAPSULE, EXTENDED RELEASE ORAL
Qty: 100 | Refills: 0 | Status: DISCONTINUED | OUTPATIENT
Start: 2024-01-01 | End: 2024-01-01

## 2024-01-01 RX ORDER — PREGABALIN 225 MG/1
1 CAPSULE ORAL
Refills: 0 | DISCHARGE

## 2024-01-01 RX ORDER — SODIUM CHLORIDE 9 MG/ML
4 INJECTION INTRAMUSCULAR; INTRAVENOUS; SUBCUTANEOUS EVERY 12 HOURS
Refills: 0 | Status: DISCONTINUED | OUTPATIENT
Start: 2024-01-01 | End: 2024-01-01

## 2024-01-01 RX ORDER — ACETAMINOPHEN 500 MG
1000 TABLET ORAL EVERY 8 HOURS
Refills: 0 | Status: DISCONTINUED | OUTPATIENT
Start: 2024-01-01 | End: 2024-01-01

## 2024-01-01 RX ORDER — DONEPEZIL HYDROCHLORIDE 10 MG/1
0 TABLET, FILM COATED ORAL
Qty: 0 | Refills: 0 | DISCHARGE

## 2024-01-01 RX ORDER — AZITHROMYCIN 500 MG/1
500 TABLET, FILM COATED ORAL DAILY
Refills: 0 | Status: DISCONTINUED | OUTPATIENT
Start: 2024-01-01 | End: 2024-01-01

## 2024-01-01 RX ORDER — CHOLECALCIFEROL (VITAMIN D3) 125 MCG
1 CAPSULE ORAL
Refills: 0 | DISCHARGE

## 2024-01-01 RX ORDER — CHOLECALCIFEROL (VITAMIN D3) 125 MCG
1000 CAPSULE ORAL DAILY
Refills: 0 | Status: DISCONTINUED | OUTPATIENT
Start: 2024-01-01 | End: 2024-01-01

## 2024-01-01 RX ADMIN — MUPIROCIN 1 APPLICATION(S): 20 OINTMENT TOPICAL at 06:11

## 2024-01-01 RX ADMIN — Medication 200 MILLIGRAM(S): at 05:37

## 2024-01-01 RX ADMIN — Medication 200 MILLIGRAM(S): at 23:38

## 2024-01-01 RX ADMIN — Medication 3 MILLILITER(S): at 19:52

## 2024-01-01 RX ADMIN — CEFTRIAXONE 100 MILLIGRAM(S): 500 INJECTION, POWDER, FOR SOLUTION INTRAMUSCULAR; INTRAVENOUS at 17:32

## 2024-01-01 RX ADMIN — MUPIROCIN 1 APPLICATION(S): 20 OINTMENT TOPICAL at 17:32

## 2024-01-01 RX ADMIN — Medication 0.5 MILLIGRAM(S): at 05:49

## 2024-01-01 RX ADMIN — MEMANTINE HYDROCHLORIDE 10 MILLIGRAM(S): 10 TABLET ORAL at 05:06

## 2024-01-01 RX ADMIN — MUPIROCIN 1 APPLICATION(S): 20 OINTMENT TOPICAL at 22:11

## 2024-01-01 RX ADMIN — Medication 1000 MILLIGRAM(S): at 22:03

## 2024-01-01 RX ADMIN — MEMANTINE HYDROCHLORIDE 10 MILLIGRAM(S): 10 TABLET ORAL at 17:40

## 2024-01-01 RX ADMIN — Medication 40 MILLIGRAM(S): at 20:58

## 2024-01-01 RX ADMIN — Medication 1000 UNIT(S): at 11:58

## 2024-01-01 RX ADMIN — MIRTAZAPINE 15 MILLIGRAM(S): 45 TABLET, ORALLY DISINTEGRATING ORAL at 22:53

## 2024-01-01 RX ADMIN — Medication 3 MILLILITER(S): at 00:45

## 2024-01-01 RX ADMIN — IRON SUCROSE 210 MILLIGRAM(S): 20 INJECTION, SOLUTION INTRAVENOUS at 18:19

## 2024-01-01 RX ADMIN — Medication 3 MILLILITER(S): at 00:38

## 2024-01-01 RX ADMIN — Medication 200 MILLIGRAM(S): at 12:25

## 2024-01-01 RX ADMIN — Medication 3 MILLIGRAM(S): at 21:16

## 2024-01-01 RX ADMIN — Medication 3 MILLILITER(S): at 19:23

## 2024-01-01 RX ADMIN — MIRTAZAPINE 15 MILLIGRAM(S): 45 TABLET, ORALLY DISINTEGRATING ORAL at 21:52

## 2024-01-01 RX ADMIN — MIRTAZAPINE 15 MILLIGRAM(S): 45 TABLET, ORALLY DISINTEGRATING ORAL at 22:13

## 2024-01-01 RX ADMIN — MUPIROCIN 1 APPLICATION(S): 20 OINTMENT TOPICAL at 17:45

## 2024-01-01 RX ADMIN — LIDOCAINE 1 PATCH: 4 CREAM TOPICAL at 00:45

## 2024-01-01 RX ADMIN — LIDOCAINE 1 PATCH: 4 CREAM TOPICAL at 19:10

## 2024-01-01 RX ADMIN — Medication 1000 UNIT(S): at 11:32

## 2024-01-01 RX ADMIN — Medication 60 MILLIGRAM(S): at 05:54

## 2024-01-01 RX ADMIN — LIDOCAINE 1 PATCH: 4 CREAM TOPICAL at 13:36

## 2024-01-01 RX ADMIN — MIRTAZAPINE 15 MILLIGRAM(S): 45 TABLET, ORALLY DISINTEGRATING ORAL at 21:19

## 2024-01-01 RX ADMIN — Medication 300 MILLIGRAM(S): at 17:19

## 2024-01-01 RX ADMIN — PREGABALIN 1000 MICROGRAM(S): 225 CAPSULE ORAL at 11:32

## 2024-01-01 RX ADMIN — Medication 200 MILLIGRAM(S): at 21:19

## 2024-01-01 RX ADMIN — Medication 975 MILLIGRAM(S): at 14:12

## 2024-01-01 RX ADMIN — MORPHINE SULFATE 2 MILLIGRAM(S): 50 CAPSULE, EXTENDED RELEASE ORAL at 02:40

## 2024-01-01 RX ADMIN — Medication 200 MILLIGRAM(S): at 05:06

## 2024-01-01 RX ADMIN — MEMANTINE HYDROCHLORIDE 10 MILLIGRAM(S): 10 TABLET ORAL at 05:30

## 2024-01-01 RX ADMIN — Medication 10 MILLIGRAM(S): at 05:36

## 2024-01-01 RX ADMIN — SODIUM CHLORIDE 500 MILLILITER(S): 9 INJECTION INTRAMUSCULAR; INTRAVENOUS; SUBCUTANEOUS at 21:00

## 2024-01-01 RX ADMIN — Medication 3 MILLILITER(S): at 00:58

## 2024-01-01 RX ADMIN — Medication 1000 MILLIGRAM(S): at 13:31

## 2024-01-01 RX ADMIN — LIDOCAINE 1 PATCH: 4 CREAM TOPICAL at 18:48

## 2024-01-01 RX ADMIN — Medication 1000 MILLIGRAM(S): at 05:18

## 2024-01-01 RX ADMIN — MEMANTINE HYDROCHLORIDE 10 MILLIGRAM(S): 10 TABLET ORAL at 05:36

## 2024-01-01 RX ADMIN — Medication 1000 MILLIGRAM(S): at 06:18

## 2024-01-01 RX ADMIN — Medication 200 MILLIGRAM(S): at 05:30

## 2024-01-01 RX ADMIN — Medication 3 MILLILITER(S): at 00:26

## 2024-01-01 RX ADMIN — Medication 0.5 MILLIGRAM(S): at 04:12

## 2024-01-01 RX ADMIN — MIRTAZAPINE 15 MILLIGRAM(S): 45 TABLET, ORALLY DISINTEGRATING ORAL at 21:48

## 2024-01-01 RX ADMIN — Medication 0.5 MILLIGRAM(S): at 14:20

## 2024-01-01 RX ADMIN — MORPHINE SULFATE 2 MILLIGRAM(S): 50 CAPSULE, EXTENDED RELEASE ORAL at 09:47

## 2024-01-01 RX ADMIN — Medication 0.5 MILLIGRAM(S): at 21:46

## 2024-01-01 RX ADMIN — MEMANTINE HYDROCHLORIDE 10 MILLIGRAM(S): 10 TABLET ORAL at 18:18

## 2024-01-01 RX ADMIN — Medication 10 MILLIGRAM(S): at 05:55

## 2024-01-01 RX ADMIN — Medication 200 MILLIGRAM(S): at 00:12

## 2024-01-01 RX ADMIN — CEFTRIAXONE 100 MILLIGRAM(S): 500 INJECTION, POWDER, FOR SOLUTION INTRAMUSCULAR; INTRAVENOUS at 16:41

## 2024-01-01 RX ADMIN — Medication 3 MILLILITER(S): at 19:54

## 2024-01-01 RX ADMIN — CEFTRIAXONE 100 MILLIGRAM(S): 500 INJECTION, POWDER, FOR SOLUTION INTRAMUSCULAR; INTRAVENOUS at 18:18

## 2024-01-01 RX ADMIN — MORPHINE SULFATE 2 MILLIGRAM(S): 50 CAPSULE, EXTENDED RELEASE ORAL at 11:54

## 2024-01-01 RX ADMIN — Medication 1000 MILLIGRAM(S): at 05:49

## 2024-01-01 RX ADMIN — DONEPEZIL HYDROCHLORIDE 10 MILLIGRAM(S): 10 TABLET, FILM COATED ORAL at 21:51

## 2024-01-01 RX ADMIN — MORPHINE SULFATE 2 MILLIGRAM(S): 50 CAPSULE, EXTENDED RELEASE ORAL at 05:55

## 2024-01-01 RX ADMIN — MEMANTINE HYDROCHLORIDE 10 MILLIGRAM(S): 10 TABLET ORAL at 21:41

## 2024-01-01 RX ADMIN — Medication 200 MILLIGRAM(S): at 17:33

## 2024-01-01 RX ADMIN — DONEPEZIL HYDROCHLORIDE 10 MILLIGRAM(S): 10 TABLET, FILM COATED ORAL at 21:16

## 2024-01-01 RX ADMIN — Medication 200 MILLIGRAM(S): at 11:58

## 2024-01-01 RX ADMIN — Medication 0.5 MILLIGRAM(S): at 00:53

## 2024-01-01 RX ADMIN — Medication 750 MILLIGRAM(S): at 00:33

## 2024-01-01 RX ADMIN — MIDODRINE HYDROCHLORIDE 5 MILLIGRAM(S): 2.5 TABLET ORAL at 12:26

## 2024-01-01 RX ADMIN — Medication 750 MILLIGRAM(S): at 05:30

## 2024-01-01 RX ADMIN — Medication 1000 MILLIGRAM(S): at 06:31

## 2024-01-01 RX ADMIN — Medication 3 MILLILITER(S): at 08:16

## 2024-01-01 RX ADMIN — PREGABALIN 1000 MICROGRAM(S): 225 CAPSULE ORAL at 11:58

## 2024-01-01 RX ADMIN — MUPIROCIN 1 APPLICATION(S): 20 OINTMENT TOPICAL at 05:48

## 2024-01-01 RX ADMIN — MORPHINE SULFATE 2 MILLIGRAM(S): 50 CAPSULE, EXTENDED RELEASE ORAL at 06:28

## 2024-01-01 RX ADMIN — MEMANTINE HYDROCHLORIDE 10 MILLIGRAM(S): 10 TABLET ORAL at 18:10

## 2024-01-01 RX ADMIN — Medication 10 MILLIGRAM(S): at 14:08

## 2024-01-01 RX ADMIN — Medication 60 MILLIGRAM(S): at 05:36

## 2024-01-01 RX ADMIN — Medication 1000 MILLIGRAM(S): at 22:17

## 2024-01-01 RX ADMIN — MORPHINE SULFATE 2 MILLIGRAM(S): 50 CAPSULE, EXTENDED RELEASE ORAL at 02:38

## 2024-01-01 RX ADMIN — DONEPEZIL HYDROCHLORIDE 10 MILLIGRAM(S): 10 TABLET, FILM COATED ORAL at 21:12

## 2024-01-01 RX ADMIN — Medication 3 MILLILITER(S): at 12:14

## 2024-01-01 RX ADMIN — Medication 1000 UNIT(S): at 13:11

## 2024-01-01 RX ADMIN — Medication 10 MILLIGRAM(S): at 05:30

## 2024-01-01 RX ADMIN — Medication 3 MILLILITER(S): at 07:46

## 2024-01-01 RX ADMIN — Medication 1000 MILLIGRAM(S): at 21:13

## 2024-01-01 RX ADMIN — Medication 1000 UNIT(S): at 11:42

## 2024-01-01 RX ADMIN — Medication 1000 MILLIGRAM(S): at 21:43

## 2024-01-01 RX ADMIN — MEMANTINE HYDROCHLORIDE 10 MILLIGRAM(S): 10 TABLET ORAL at 17:43

## 2024-01-01 RX ADMIN — Medication 1000 MILLIGRAM(S): at 21:52

## 2024-01-01 RX ADMIN — LIDOCAINE 1 PATCH: 4 CREAM TOPICAL at 18:21

## 2024-01-01 RX ADMIN — Medication 1000 MILLIGRAM(S): at 05:36

## 2024-01-01 RX ADMIN — MEMANTINE HYDROCHLORIDE 10 MILLIGRAM(S): 10 TABLET ORAL at 17:13

## 2024-01-01 RX ADMIN — Medication 1000 MILLIGRAM(S): at 22:12

## 2024-01-01 RX ADMIN — Medication 3 MILLILITER(S): at 12:55

## 2024-01-01 RX ADMIN — Medication 0.5 MILLIGRAM(S): at 08:47

## 2024-01-01 RX ADMIN — MEMANTINE HYDROCHLORIDE 10 MILLIGRAM(S): 10 TABLET ORAL at 05:18

## 2024-01-01 RX ADMIN — Medication 200 MILLIGRAM(S): at 11:29

## 2024-01-01 RX ADMIN — MUPIROCIN 1 APPLICATION(S): 20 OINTMENT TOPICAL at 05:30

## 2024-01-01 RX ADMIN — AZITHROMYCIN 255 MILLIGRAM(S): 500 TABLET, FILM COATED ORAL at 12:14

## 2024-01-01 RX ADMIN — MEMANTINE HYDROCHLORIDE 10 MILLIGRAM(S): 10 TABLET ORAL at 17:31

## 2024-01-01 RX ADMIN — Medication 200 MILLIGRAM(S): at 23:21

## 2024-01-01 RX ADMIN — Medication 3 MILLILITER(S): at 19:42

## 2024-01-01 RX ADMIN — Medication 1000 MILLIGRAM(S): at 05:30

## 2024-01-01 RX ADMIN — Medication 200 MILLIGRAM(S): at 06:10

## 2024-01-01 RX ADMIN — Medication 200 MILLIGRAM(S): at 11:32

## 2024-01-01 RX ADMIN — DONEPEZIL HYDROCHLORIDE 10 MILLIGRAM(S): 10 TABLET, FILM COATED ORAL at 21:48

## 2024-01-01 RX ADMIN — MUPIROCIN 1 APPLICATION(S): 20 OINTMENT TOPICAL at 05:18

## 2024-01-01 RX ADMIN — MORPHINE SULFATE 2 MILLIGRAM(S): 50 CAPSULE, EXTENDED RELEASE ORAL at 11:40

## 2024-01-01 RX ADMIN — LIDOCAINE 1 PATCH: 4 CREAM TOPICAL at 14:12

## 2024-01-01 RX ADMIN — Medication 3 MILLILITER(S): at 07:12

## 2024-01-01 RX ADMIN — Medication 200 MILLIGRAM(S): at 05:18

## 2024-01-01 RX ADMIN — MORPHINE SULFATE 2 MILLIGRAM(S): 50 CAPSULE, EXTENDED RELEASE ORAL at 10:11

## 2024-01-01 RX ADMIN — Medication 200 MILLIGRAM(S): at 05:36

## 2024-01-01 RX ADMIN — Medication 200 MILLIGRAM(S): at 18:18

## 2024-01-01 RX ADMIN — Medication 60 MILLIGRAM(S): at 12:18

## 2024-01-01 RX ADMIN — Medication 3 MILLILITER(S): at 07:26

## 2024-01-01 RX ADMIN — PREGABALIN 1000 MICROGRAM(S): 225 CAPSULE ORAL at 12:26

## 2024-01-01 RX ADMIN — Medication 3 MILLILITER(S): at 13:39

## 2024-01-01 RX ADMIN — MORPHINE SULFATE 2 MILLIGRAM(S): 50 CAPSULE, EXTENDED RELEASE ORAL at 14:07

## 2024-01-01 RX ADMIN — MORPHINE SULFATE 2 MILLIGRAM(S): 50 CAPSULE, EXTENDED RELEASE ORAL at 22:16

## 2024-01-01 RX ADMIN — Medication 3 MILLILITER(S): at 14:33

## 2024-01-01 RX ADMIN — DONEPEZIL HYDROCHLORIDE 10 MILLIGRAM(S): 10 TABLET, FILM COATED ORAL at 21:58

## 2024-01-01 RX ADMIN — SODIUM CHLORIDE 250 MILLILITER(S): 9 INJECTION, SOLUTION INTRAVENOUS at 10:50

## 2024-01-01 RX ADMIN — PREGABALIN 1000 MICROGRAM(S): 225 CAPSULE ORAL at 11:42

## 2024-01-01 RX ADMIN — MIRTAZAPINE 15 MILLIGRAM(S): 45 TABLET, ORALLY DISINTEGRATING ORAL at 21:57

## 2024-01-01 RX ADMIN — MIRTAZAPINE 15 MILLIGRAM(S): 45 TABLET, ORALLY DISINTEGRATING ORAL at 21:16

## 2024-01-01 RX ADMIN — Medication 200 MILLIGRAM(S): at 13:10

## 2024-01-01 RX ADMIN — MEMANTINE HYDROCHLORIDE 10 MILLIGRAM(S): 10 TABLET ORAL at 05:35

## 2024-01-01 RX ADMIN — Medication 300 MILLIGRAM(S): at 23:38

## 2024-01-01 RX ADMIN — DONEPEZIL HYDROCHLORIDE 10 MILLIGRAM(S): 10 TABLET, FILM COATED ORAL at 22:53

## 2024-01-01 RX ADMIN — MORPHINE SULFATE 1 MG/HR: 50 CAPSULE, EXTENDED RELEASE ORAL at 12:05

## 2024-01-01 RX ADMIN — Medication 10 MILLIGRAM(S): at 05:48

## 2024-01-01 RX ADMIN — Medication 3 MILLILITER(S): at 13:35

## 2024-01-01 RX ADMIN — Medication 200 MILLIGRAM(S): at 17:13

## 2024-01-01 RX ADMIN — Medication 200 MILLIGRAM(S): at 17:43

## 2024-01-01 RX ADMIN — Medication 1000 MILLIGRAM(S): at 13:10

## 2024-01-01 RX ADMIN — Medication 3 MILLILITER(S): at 20:05

## 2024-01-01 RX ADMIN — Medication 1000 MILLIGRAM(S): at 21:15

## 2024-01-01 RX ADMIN — LIDOCAINE 1 PATCH: 4 CREAM TOPICAL at 13:12

## 2024-01-01 RX ADMIN — IRON SUCROSE 210 MILLIGRAM(S): 20 INJECTION, SOLUTION INTRAVENOUS at 17:39

## 2024-01-01 RX ADMIN — Medication 200 MILLIGRAM(S): at 00:42

## 2024-01-01 RX ADMIN — MUPIROCIN 1 APPLICATION(S): 20 OINTMENT TOPICAL at 06:16

## 2024-01-01 RX ADMIN — Medication 1000 MILLIGRAM(S): at 13:12

## 2024-01-01 RX ADMIN — Medication 3 MILLILITER(S): at 01:54

## 2024-01-01 RX ADMIN — Medication 200 MILLIGRAM(S): at 21:41

## 2024-01-01 RX ADMIN — Medication 1000 MILLIGRAM(S): at 14:00

## 2024-01-01 RX ADMIN — Medication 1000 MILLIGRAM(S): at 14:15

## 2024-01-01 RX ADMIN — Medication 1000 MILLIGRAM(S): at 21:58

## 2024-01-01 RX ADMIN — Medication 3 MILLILITER(S): at 07:23

## 2024-01-01 RX ADMIN — MORPHINE SULFATE 2 MILLIGRAM(S): 50 CAPSULE, EXTENDED RELEASE ORAL at 14:46

## 2024-01-01 RX ADMIN — Medication 200 MILLIGRAM(S): at 05:50

## 2024-01-01 RX ADMIN — Medication 1000 MILLIGRAM(S): at 06:06

## 2024-01-01 RX ADMIN — Medication 1000 UNIT(S): at 12:26

## 2024-01-01 RX ADMIN — CEFTRIAXONE 100 MILLIGRAM(S): 500 INJECTION, POWDER, FOR SOLUTION INTRAMUSCULAR; INTRAVENOUS at 17:43

## 2024-01-01 RX ADMIN — Medication 200 MILLIGRAM(S): at 17:40

## 2024-01-01 RX ADMIN — PREGABALIN 1000 MICROGRAM(S): 225 CAPSULE ORAL at 11:10

## 2024-01-01 RX ADMIN — Medication 200 MILLIGRAM(S): at 18:10

## 2024-01-01 RX ADMIN — Medication 3 MILLILITER(S): at 13:44

## 2024-01-01 RX ADMIN — MORPHINE SULFATE 2 MILLIGRAM(S): 50 CAPSULE, EXTENDED RELEASE ORAL at 04:04

## 2024-01-01 RX ADMIN — Medication 3 MILLILITER(S): at 07:33

## 2024-01-01 RX ADMIN — Medication 1000 MILLIGRAM(S): at 21:48

## 2024-01-01 RX ADMIN — PREGABALIN 1000 MICROGRAM(S): 225 CAPSULE ORAL at 13:11

## 2024-01-01 RX ADMIN — Medication 3 MILLILITER(S): at 02:14

## 2024-01-01 RX ADMIN — MORPHINE SULFATE 2 MILLIGRAM(S): 50 CAPSULE, EXTENDED RELEASE ORAL at 17:52

## 2024-01-01 RX ADMIN — LIDOCAINE 1 PATCH: 4 CREAM TOPICAL at 01:12

## 2024-01-01 RX ADMIN — Medication 60 MILLIGRAM(S): at 05:49

## 2024-01-01 RX ADMIN — Medication 1000 MILLIGRAM(S): at 06:10

## 2024-01-01 RX ADMIN — DONEPEZIL HYDROCHLORIDE 10 MILLIGRAM(S): 10 TABLET, FILM COATED ORAL at 22:13

## 2024-01-01 RX ADMIN — SODIUM CHLORIDE 500 MILLILITER(S): 9 INJECTION INTRAMUSCULAR; INTRAVENOUS; SUBCUTANEOUS at 09:55

## 2024-01-01 RX ADMIN — Medication 1000 MILLIGRAM(S): at 06:51

## 2024-01-01 RX ADMIN — Medication 1000 MILLIGRAM(S): at 21:45

## 2024-01-01 RX ADMIN — MEMANTINE HYDROCHLORIDE 10 MILLIGRAM(S): 10 TABLET ORAL at 05:50

## 2024-01-01 RX ADMIN — MUPIROCIN 1 APPLICATION(S): 20 OINTMENT TOPICAL at 18:18

## 2024-01-01 RX ADMIN — Medication 200 MILLIGRAM(S): at 11:42

## 2024-01-01 RX ADMIN — DONEPEZIL HYDROCHLORIDE 10 MILLIGRAM(S): 10 TABLET, FILM COATED ORAL at 21:19

## 2024-01-01 RX ADMIN — LIDOCAINE 1 PATCH: 4 CREAM TOPICAL at 06:35

## 2024-01-01 RX ADMIN — CEFTRIAXONE 100 MILLIGRAM(S): 500 INJECTION, POWDER, FOR SOLUTION INTRAMUSCULAR; INTRAVENOUS at 11:15

## 2024-01-01 RX ADMIN — Medication 1000 MILLIGRAM(S): at 13:57

## 2024-01-01 RX ADMIN — CEFTRIAXONE 100 MILLIGRAM(S): 500 INJECTION, POWDER, FOR SOLUTION INTRAMUSCULAR; INTRAVENOUS at 17:52

## 2024-01-01 RX ADMIN — MEMANTINE HYDROCHLORIDE 10 MILLIGRAM(S): 10 TABLET ORAL at 06:10

## 2024-01-01 RX ADMIN — TRAMADOL HYDROCHLORIDE 50 MILLIGRAM(S): 50 TABLET ORAL at 08:16

## 2024-01-01 RX ADMIN — TRAMADOL HYDROCHLORIDE 50 MILLIGRAM(S): 50 TABLET ORAL at 08:46

## 2024-01-01 RX ADMIN — MIRTAZAPINE 15 MILLIGRAM(S): 45 TABLET, ORALLY DISINTEGRATING ORAL at 21:13

## 2024-01-01 RX ADMIN — Medication 300 MILLIGRAM(S): at 05:08

## 2024-01-01 RX ADMIN — IRON SUCROSE 210 MILLIGRAM(S): 20 INJECTION, SOLUTION INTRAVENOUS at 16:02

## 2024-01-01 RX ADMIN — Medication 1000 UNIT(S): at 11:10

## 2024-01-01 RX ADMIN — Medication 3 MILLILITER(S): at 20:48

## 2024-01-01 RX ADMIN — Medication 3 MILLILITER(S): at 15:00

## 2024-01-01 NOTE — REASON FOR VISIT
C/O \"I just want my keys back\". Denies medical complaints. Refuses vitals being checked. Stable gait, A&Ox3.    [Follow-Up: _____] : a [unfilled] follow-up visit [Other: _____] : [unfilled] [FreeTextEntry1] : for Dementia / lethargy

## 2024-05-06 NOTE — DATA REVIEWED
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\par \par \par  [de-identified] : 3/9/20: Cognitive assessment\par Global cognitive score: 83.7\par More than one standard deviation below average: Memory 75, motor skills 83.8 verbal function \par Below average in domains:  attention 87.3, executive function 85.6,  information processing speed 86.6, \par Above average in domains: None\par 5/15/18: Cognitive assessment: Global battery\par Global cognitive score: 93.2\par More than one standard deviation below average: Memory 81.6,  verbal function 72.9\par Below average in domains:  attention 91.8, executive function 91,  information processing speed 96.2, \par Above average in domains:visuospatial function 109.8,  motor skills 108.8 [de-identified] : 2/1/18; B12, TSH normal\par Rest of labs reviewed .

## 2024-05-27 NOTE — H&P ADULT - ASSESSMENT
89 y/o F with PMHx ILD (not on home O2), pulmonary fibrosis, lung granulomatosis disease (s/p granuloma resection 1995), intestinal obstruction, mild dysplastic syndrome, pernicious anemia, dementia, OA, osteoporosis presenting to the ED s/p unwitnessed fall at home and productive cough. Admitted for PNA. 87 y/o F with PMHx ILD (not on home O2), pulmonary fibrosis, lung granulomatosis disease (s/p granuloma resection 1995), intestinal obstruction, mild dysplastic syndrome, pernicious anemia, dementia, OA, osteoporosis presenting to the ED s/p unwitnessed fall at home and productive cough. Admitted for PNA, UTI.

## 2024-05-27 NOTE — ED ADULT NURSE NOTE - NSFALLHARMRISKINTERV_ED_ALL_ED
Assistance OOB with selected safe patient handling equipment if applicable/Assistance with ambulation/Communicate risk of Fall with Harm to all staff, patient, and family/Monitor for mental status changes and reorient to person, place, and time, as needed/Move patient closer to nursing station/within visual sight of ED staff/Provide visual cue: red socks, yellow wristband, yellow gown, etc/Reinforce activity limits and safety measures with patient and family/Toileting schedule using arm’s reach rule for commode and bathroom/Use of alarms - bed, stretcher, chair and/or video monitoring/Bed in lowest position, wheels locked, appropriate side rails in place/Call bell, personal items and telephone in reach/Instruct patient to call for assistance before getting out of bed/chair/stretcher/Non-slip footwear applied when patient is off stretcher/[place] to call system/Physically safe environment - no spills, clutter or unnecessary equipment/Purposeful Proactive Rounding/Room/bathroom lighting operational, light cord in reach

## 2024-05-27 NOTE — H&P ADULT - NSHPREVIEWOFSYSTEMS_GEN_ALL_CORE
CONSTITUTIONAL: + chills, fatigue  HEENT: denies blurred vision, sore throat  SKIN: +bruising of R shoulder, R knee, R side of head  CARDIOVASCULAR: denies chest pain, chest pressure, palpitations  RESPIRATORY: +cough, + sputum production, denies shortness of breath,   GASTROINTESTINAL: denies nausea, vomiting, diarrhea, abdominal pain  GENITOURINARY: denies dysuria, discharge  NEUROLOGICAL: denies numbness, headache, focal weakness  MUSCULOSKELETAL: +R ribcage pain, +R sternal pain  HEMATOLOGIC: denies gross bleeding, bruising  LYMPHATICS: denies enlarged lymph nodes, extremity swelling  PSYCHIATRIC: denies recent changes in anxiety, depression  ENDOCRINOLOGIC: denies sweating, cold or heat intolerance

## 2024-05-27 NOTE — ED ADULT TRIAGE NOTE - CHIEF COMPLAINT QUOTE
found on the floor - fell around 3am- family saw it in the videocam- aide was called in early- patient fell- denies any pain. patient remembers falling - but doesnot remember how. patient noted to have bruise on the right side of the forehead.

## 2024-05-27 NOTE — H&P ADULT - HISTORY OF PRESENT ILLNESS
87 y/o F with PMHx ILD (not on home O2), pulmonary fibrosis, lung granulomatosis disease (s/p granuloma resection 1995), intestinal obstruction, mild dysplastic syndrome, pernicious anemia, dementia, OA, osteoporosis presenting to the ED s/p unwitnessed fall at home and productive cough. Patient has dementia, majority of history obtained from HHA and daughter at bedside. Patient attended a graduation 8 days ago, and returned with a productive cough four days later. Cough has been worsening, and patient has had decreased PO intake and fatigue. Patient lives alone with a HHA for several hour a day. Around 3am this morning, patient was found on the ground via cameras the family had setup and requested the HHA assist her. Given her state, the aid called EMS. Currently, patient reports cough and mid-sternal chest pain worse with coughing and movement. Denies headache, blurry vision, SOB, abd pain.      ED course    Vitals: T 97.9, HR 87, BP 85/52 --> 101/50, RR 16, SpO2 99% RA  Labs:  WBC 18, Hgb 10.4, lactate 2.1 --> 1.3, pro-BNP 5349  UA: cloudy, mod LE, +blood, 15 WBC, few bacteria   EKG: NSR at 86 bpm, PVCs, ST abnormality in leads II and III    Given: Ceftriaxone, Azithromycin, Duoneb     CT Head:  No acute intracranial hemorrhage, brain edema, or mass effect.  No displaced calvarial fracture.  Right anterolateral extra calvarial soft tissue swelling/hematoma.    CT Neck:   No acute fracture or traumatic subluxation.  No prevertebral soft tissue swelling.  Degenerative changes.    CT Chest, Abd, Pelvis:   1.  RIGHT lateral 7th rib fracture (309/141). Minimal anterior RIGHT   6th,7th rib deformities/possible additional nondisplaced fractures   (309-174, 191).  2.  Age indeterminant LEFT pubic fractures favors chronic etiology (area   degraded by motion unsharpness and orthopedic beam hardening artifact).  3.  Bilateral lung patchy airspace and groundglass opacities. Chronic   underlying lung disease cannot be distinguished from multifocal   bronchopneumonia or combination of both. Small dependent pleural effusions  4.  Prominent/normal size spleen- wandering spleen,  enlarged from prior   exams. Clinical correlation with regard to LUQ pain recommended    XR Humerus, Right: no fracture. Reversed right shoulder replacement  XR Knee, Right: no fracture, no effusion 89 y/o F with PMHx ILD (not on home O2), pulmonary fibrosis, lung granulomatosis disease (s/p granuloma resection 1995), intestinal obstruction, mild dysplastic syndrome, pernicious anemia, dementia, OA, osteoporosis presenting to the ED s/p unwitnessed fall at home and productive cough. Patient has dementia, majority of history obtained from HHA and daughter at bedside. Patient attended a graduation 8 days ago, and returned with a productive cough four days later. Cough has been worsening, and patient has had decreased PO intake and fatigue. Patient lives alone with a HHA for several hour a day. Around 3am this morning, patient was found on the ground via cameras the family had setup and requested the HHA assist her. Pt unsure of events leading to fall. Denies LOC. Given her state, the aid called EMS. Currently, patient reports cough and mid-sternal chest pain worse with coughing and movement. Denies headache, blurry vision, SOB, abd pain.      ED course    Vitals: T 97.9, HR 87, BP 85/52 --> 101/50, RR 16, SpO2 99% RA  Labs:  WBC 18, Hgb 10.4, lactate 2.1 --> 1.3, pro-BNP 5349  UA: cloudy, mod LE, +blood, 15 WBC, few bacteria   EKG: NSR at 86 bpm, PVCs, ST abnormality in leads II and III    Given: Ceftriaxone, Azithromycin, Duoneb     CT Head:  No acute intracranial hemorrhage, brain edema, or mass effect.  No displaced calvarial fracture.  Right anterolateral extra calvarial soft tissue swelling/hematoma.    CT Neck:   No acute fracture or traumatic subluxation.  No prevertebral soft tissue swelling.  Degenerative changes.    CT Chest, Abd, Pelvis:   1.  RIGHT lateral 7th rib fracture (309/141). Minimal anterior RIGHT   6th,7th rib deformities/possible additional nondisplaced fractures   (309-174, 191).  2.  Age indeterminant LEFT pubic fractures favors chronic etiology (area   degraded by motion unsharpness and orthopedic beam hardening artifact).  3.  Bilateral lung patchy airspace and groundglass opacities. Chronic   underlying lung disease cannot be distinguished from multifocal   bronchopneumonia or combination of both. Small dependent pleural effusions  4.  Prominent/normal size spleen- wandering spleen,  enlarged from prior   exams. Clinical correlation with regard to LUQ pain recommended    XR Humerus, Right: no fracture. Reversed right shoulder replacement  XR Knee, Right: no fracture, no effusion

## 2024-05-27 NOTE — ED PROVIDER NOTE - CLINICAL SUMMARY MEDICAL DECISION MAKING FREE TEXT BOX
Patient is an 88-year-old female who presents to the emergency room status post fall with reported hypotension.  Past medical history of Boop, uterine fibroids, lung granulomatosis disease granuloma removed in 1995 at [facility], interstitial lung disease, history of intestinal obstruction, lymphadenopathy, mild dysplastic syndrome, dementia, OA, osteoporosis, pernicious anemia, pulmonary fibrosis,.  History is limited as patient suffers from dementia.  Per EMS family has cameras in the house.  When they checked the cameras this morning they found the patient was on the ground.  They are able to see that the patient fell at around 3 AM.  The aide was called to come to the house early to help lift the patient back to bed.  EMS was then called and patient was taken to the hospital.  They does report that patient has had a worsening cough over the last 2 weeks. Patient presenting to the emergency room status post mechanical fall.  Was found to be hypotensive on arrival.  Differential is broad includes possible infectious etiology versus neurologic pathology versus dehydration.  Will obtain screening septic workup pan scan provide gentle hydration and monitor.  Ultimate clinical disposition will be pending results. Independent review of EKG reveals a sinus rhythm with PVCs at a rate of 86 bpm Patient is an 88-year-old female who presents to the emergency room status post fall with reported hypotension.  Past medical history of Boop, uterine fibroids, lung granulomatosis disease granuloma removed in 1995 at [facility], interstitial lung disease, history of intestinal obstruction, lymphadenopathy, mild dysplastic syndrome, dementia, OA, osteoporosis, pernicious anemia, pulmonary fibrosis,.  History is limited as patient suffers from dementia.  Per EMS family has cameras in the house.  When they checked the cameras this morning they found the patient was on the ground.  They are able to see that the patient fell at around 3 AM.  The aide was called to come to the house early to help lift the patient back to bed.  EMS was then called and patient was taken to the hospital.  They does report that patient has had a worsening cough over the last 2 weeks. Patient presenting to the emergency room status post mechanical fall.  Was found to be hypotensive on arrival.  Differential is broad includes possible infectious etiology versus neurologic pathology versus dehydration.  Will obtain screening septic workup pan scan provide gentle hydration and monitor.  Ultimate clinical disposition will be pending results. Independent review of EKG reveals a sinus rhythm with PVCs at a rate of 86 bpm. Results of labs reviewed patient with a leukocytosis antibiotics ordered CMP noted lactate of 2.1 will repeat after 500 cc bolus.  As patient has a history of CHF and appears to have congestive changes will avoid aggressive hydration.  proBNP is elevated.  Urine does appear to have be infected receiving antibiotics.  Independent review of chest x-ray reveals increasing infiltrates.  Independent review of knee and shoulder x-rays reveal no acute fracture.  Independent review of CT imaging reveals no acute intracranial hemorrhage or cervical spine fracture.  Blood pressures responded to fluids will admit this time for further workup and evaluation.

## 2024-05-27 NOTE — ED PROVIDER NOTE - WET READ LAUNCH FT
Requested Prescriptions     Pending Prescriptions Disp Refills    carvedilol (COREG) 12.5 MG tablet [Pharmacy Med Name: CARVEDILOL 12.5MG TABLETS] 180 tablet 3     Sig: TAKE 1 TABLET BY MOUTH TWICE DAILY WITH MEALS            Last Office Visit: 2/10/2022     Next Office Visit: [phone] There is 1 Wet Read(s) to document. There are no Wet Read(s) to document.

## 2024-05-27 NOTE — H&P ADULT - PROBLEM SELECTOR PLAN 2
s/p unwitnessed fall at home alone this AM. Hematomas of R frontotemporal region and R shoulder. + rib fractures  - CT Chest: acute R 6th and 7th rib fractures (see report)  - CT Head: Right anterolateral extra calvarial soft tissue swelling/hematoma. No hemorrhage  - Pain management: Tylenol, lidocaine patch, toradol prn severe pain  - Check orthostatics   - Hold mirtazapine due to possible sedating effects s/p unwitnessed fall at home alone this AM. Hematomas of R frontotemporal region and R shoulder. + rib fractures  - May be 2/2 acute infection + dehydration from decreased PO intake. Denies LOC though unclear  - CT Chest: acute R 6th and 7th rib fractures (see report)  - CT Head: Right anterolateral extra calvarial soft tissue swelling/hematoma. No hemorrhage  - Pain management: Tylenol, lidocaine patch, toradol prn severe pain  - Check orthostatics   - Hold mirtazapine due to possible sedating effects  - fall risk protocol s/p unwitnessed fall at home alone this AM. Hematomas of R frontotemporal region and R shoulder. + rib fractures  - May be 2/2 acute infection + dehydration from decreased PO intake. Denies LOC though unclear  - CT Chest: acute R 6th and 7th rib fractures (see report)  - CT Head: Right anterolateral extra calvarial soft tissue swelling/hematoma. No hemorrhage  - Pain management: Ofirmev x4 doses, Start PO tylenol tomorrow, lidocaine patch, tramadol 25mg prn mod pain, 50mg severe pain  - Check orthostatics   - fall risk protocol

## 2024-05-27 NOTE — H&P ADULT - NSHPSOCIALHISTORY_GEN_ALL_CORE
Denies tobacco, alcohol, recreational drug use    Lives: alone at home with HHA from 8 - 4pm   ADLs: partially dependent  Ambulation: without assistance

## 2024-05-27 NOTE — ED PROVIDER NOTE - DIFFERENTIAL DIAGNOSIS
Differential Diagnosis Patient presenting to the emergency room status post mechanical fall.  Was found to be hypotensive on arrival.  Differential is broad includes possible infectious etiology versus neurologic pathology versus dehydration.  Will obtain screening septic workup pan scan provide gentle hydration and monitor.  Ultimate clinical disposition will be pending results.

## 2024-05-27 NOTE — ED ADULT NURSE NOTE - NSFALLRISKASMT_ED_ALL_ED_DT
3/23/2023    Patient: Tricia Gomez   YOB: 1940   Date of Visit: 3/23/2023     John Paul Self MD  [address] [facility]    Dear John Paul Self MD,      Thank you for referring Ms. Laly Garcia to [facility] for evaluation. My notes for this consultation are attached. If you have questions, please do not hesitate to call me. I look forward to following your patient along with you.       Sincerely,    Eder Robertson MD 27-May-2024 10:39

## 2024-05-27 NOTE — H&P ADULT - NSHPPHYSICALEXAM_GEN_ALL_CORE
T(C): 36.1 (05-27-24 @ 12:35), Max: 36.6 (05-27-24 @ 09:10)  HR: 76 (05-27-24 @ 12:35) (76 - 87)  BP: 101/50 (05-27-24 @ 12:35) (85/52 - 101/50)  RR: 24 (05-27-24 @ 12:35) (16 - 24)  SpO2: 100% (05-27-24 @ 12:35) (96% - 100%)    GENERAL: +mild distress when coughing 2/2 rib pain  EYES: sclera clear, no exudates  ENMT: oropharynx clear without erythema, no exudates, moist mucous membranes  NECK: supple, soft, no thyromegaly noted  LUNGS: +wheezing and rhonchi (R > L)  HEART: + S1/S2, RRR, no murmurs  GASTROINTESTINAL: soft, +epigastric and JAZMINE tenderness to palpation  INTEGUMENT: +golf-ball sized hematomas of R frontotemporal region and R shoulder  MUSCULOSKELETAL: no clubbing or cyanosis, no obvious deformity  NEUROLOGIC: AOx2 (person, place), good muscle tone in 4 extremities, no obvious sensory deficits  PSYCHIATRIC: mood is good, affect is congruent, linear and logical thought process

## 2024-05-27 NOTE — H&P ADULT - NSICDXPASTMEDICALHX_GEN_ALL_CORE_FT
PAST MEDICAL HISTORY:  BOOP (bronchiolitis obliterans with organizing pneumonia) 1991    Fibroids 1978    Granulomatous lung disease 1995 at [facility] had granuloma removed    ILD (interstitial lung disease)     Intestinal obstruction 1999    Lichen of skin vaginal area, 2012    Lymphadenopathy, inguinal right 1988, removed surgically    MDS (myelodysplastic syndrome) 2013, found incidentally    Mild dementia     Osteoarthritis     Osteoporosis     Pernicious anemia 2000    Pulmonary fibrosis 8/13    Sprain of right rotator cuff capsule, initial encounter

## 2024-05-27 NOTE — ED ADULT NURSE REASSESSMENT NOTE - NS ED NURSE REASSESS COMMENT FT1
pt is at baseline as per daughter, pts VS as per flowsheet. no c/o pain at this time. Provider covering MD Lazar made aware. care ongoing.

## 2024-05-27 NOTE — H&P ADULT - PROBLEM SELECTOR PLAN 6
AOx2 at baseline. At baseline on admission   - CT Head neg for acute stroke  - Cont donepezil, memantine, mirtazepine AOx2 at baseline. At baseline on admission   - CT Head neg for acute stroke  - Cont donepezil, mirtazepine  - Memantine ER 21mg non-formulary --> 10mg BID

## 2024-05-27 NOTE — ED PROVIDER NOTE - PHYSICAL EXAMINATION
Patient able to range both legs and arms off the bed.  Contusion noted to the right humerus  Bilateral knee contusions noted with an abrasion to the right knee no bony tenderness.  Peripheral pulses x 4.

## 2024-05-27 NOTE — PATIENT PROFILE ADULT - ...
27-May-2024 23:15:43 Admission Reconciliation is Completed  Discharge Reconciliation is Not Complete Admission Reconciliation is Completed  Discharge Reconciliation is Completed

## 2024-05-27 NOTE — H&P ADULT - PROBLEM SELECTOR PLAN 5
SCDs given hematomas Septic 2/2 PNA + UTI.  asymptomatic  - UA: cloudy, +LE, 15 WBC, few bacteria   - Cont Ceftriaxone  - f/u Urine cx  - I/Os, trend renal function  - encourage PO intake

## 2024-05-27 NOTE — H&P ADULT - PROBLEM SELECTOR PLAN 3
AOx2 at baseline. At baseline on admission   - CT Head neg for acute stroke  - Cont donepezil and memantine No reported h/o CHF  - pro-BNP 5300 on admission  - CT Chest: +small dependent pleural effusions bilaterally  - Takes lasix 20mg at home for leg swelling. Restart in AM with hold parameters   - f/u TTE

## 2024-05-27 NOTE — ED ADULT NURSE REASSESSMENT NOTE - NS ED NURSE REASSESS COMMENT FT1
pt is at baseline, daughter at bedside. pt given 500ml bolus as per MD Francisco. Repeat BP, provider to be aware. CM/ in place. call bell placed within reach.  Care ongoing.

## 2024-05-27 NOTE — H&P ADULT - PROBLEM SELECTOR PLAN 1
Productive cough, weakness, decreased PO intake x 1week following large social gathering. Likely CAP Bacterial vs. Viral  - Tachypnea, Leukocytosis, elevated lactate, hypotensive on admission, +imaging meets sepsis criteria  - CT Chest:  Bilateral lung patchy airspace and groundglass opacities. Chronic underlying lung disease cannot be distinguished from multifocal bronchopneumonia or combination of both. Small dependent pleural effusions  - s/p Ceftriaxone, Azithro, 500cc IVF bolus in ED. Will continue   - f/u blood and urine Cx. f/u legionella, strept pneumonia.  RVP neg.   - Tessalon perles   - Gentle IVF Productive cough, weakness, decreased PO intake x 1week following large social gathering. Likely CAP Bacterial vs. Viral  - Tachypnea, Leukocytosis, elevated lactate, hypotensive on admission, +imaging meets sepsis criteria  - CT Chest:  Bilateral lung patchy airspace and groundglass opacities. Chronic underlying lung disease cannot be distinguished from multifocal bronchopneumonia or combination of both. Small dependent pleural effusions  - s/p Ceftriaxone, Azithro, 500cc IVF bolus in ED. Will continue   - f/u blood and urine Cx. f/u legionella, strept pneumonia.  RVP neg.   - Robitussin  - Hold IVF in setting of pleural eff and elevated BNP  - ID consult Productive cough, weakness, decreased PO intake x 1week following large social gathering. Likely CAP Bacterial vs. Viral  - Tachypnea, Leukocytosis, elevated lactate, hypotensive on admission, +imaging meets sepsis criteria  - CT Chest:  Bilateral lung patchy airspace and groundglass opacities. Chronic underlying lung disease cannot be distinguished from multifocal bronchopneumonia or combination of both. Small dependent pleural effusions  - s/p Ceftriaxone, Azithro, 500cc IVF bolus in ED. Will continue   - f/u blood and urine Cx. f/u legionella, strept pneumonia.  RVP neg.   - Robitussin  - Hold IVF in setting of pleural eff and elevated BNP. Encourage PO intake   - ID (Dr. Dozier) consult

## 2024-05-27 NOTE — CONSULT NOTE ADULT - SUBJECTIVE AND OBJECTIVE BOX
[facility]  INFECTIOUS DISEASES   [address] [place] [address]  Tel: [phone]     Fax: [phone]  ========================================================  MD Massimo Barakat Kaushal, MD Cho, Michelle, MD Sunjit, Jaspal, MD  ========================================================    MRN-[id number]  VALORIE PLATA     CC: Patient is a 88y old  Female who presents with a chief complaint of PNA (27 May 2024 13:26)    HPI:  89 y/o F with PMHx ILD (not on home O2), pulmonary fibrosis, lung granulomatosis disease (s/p granuloma resection ), intestinal obstruction, mild dysplastic syndrome, pernicious anemia, dementia, OA, osteoporosis presenting to the ED s/p unwitnessed fall at home and productive cough. Patient has dementia, majority of history obtained from HHA and daughter at bedside. Patient attended a graduation 8 days ago, and returned with a productive cough four days later. Cough has been worsening, and patient has had decreased PO intake and fatigue. Patient lives alone with a HHA for several hour a day. Around 3am this morning, patient was found on the ground via cameras the family had setup and requested the HHA assist her. Pt unsure of events leading to fall. Denies LOC. Given her state, the aid called EMS. Currently, patient reports cough and mid-sternal chest pain worse with coughing and movement. Denies headache, blurry vision, SOB, abd pain.      ED course    Vitals: T 97.9, HR 87, BP 85/52 --> 101/50, RR 16, SpO2 99% RA  Labs:  WBC 18, Hgb 10.4, lactate 2.1 --> 1.3, pro-BNP 5349  UA: cloudy, mod LE, +blood, 15 WBC, few bacteria   EKG: NSR at 86 bpm, PVCs, ST abnormality in leads II and III    Given: Ceftriaxone, Azithromycin, Duoneb     CT Head:  No acute intracranial hemorrhage, brain edema, or mass effect.  No displaced calvarial fracture.  Right anterolateral extra calvarial soft tissue swelling/hematoma.    CT Neck:   No acute fracture or traumatic subluxation.  No prevertebral soft tissue swelling.  Degenerative changes.    CT Chest, Abd, Pelvis:   1.  RIGHT lateral 7th rib fracture (309/141). Minimal anterior RIGHT   6th,7th rib deformities/possible additional nondisplaced fractures   (309-174, 191).  2.  Age indeterminant LEFT pubic fractures favors chronic etiology (area   degraded by motion unsharpness and orthopedic beam hardening artifact).  3.  Bilateral lung patchy airspace and groundglass opacities. Chronic   underlying lung disease cannot be distinguished from multifocal   bronchopneumonia or combination of both. Small dependent pleural effusions  4.  Prominent/normal size spleen- wandering spleen,  enlarged from prior   exams. Clinical correlation with regard to LUQ pain recommended    XR Humerus, Right: no fracture. Reversed right shoulder replacement  XR Knee, Right: no fracture, no effusion (27 May 2024 13:26)      PAST MEDICAL & SURGICAL HISTORY:  Fibroids    Lymphadenopathy, inguinal  right , removed surgically  Granulomatous lung disease   at [facility] had granuloma removed  Pulmonary fibrosis    Osteoarthritis  Osteoporosis  Pernicious anemia    Lichen of skin  vaginal area,   Intestinal obstruction    MDS (myelodysplastic syndrome)  , found incidentally  BOOP (bronchiolitis obliterans with organizing pneumonia)    ILD (interstitial lung disease)  Mild dementia  Sprain of right rotator cuff capsule, initial encounter  S/P tubal ligation    S/P hysterectomy  Left ovary left in place,   S/P hip replacement  right   S/P hip replacement  left   Elective surgery  (VAT and biopsy, )    Social Hx: No current smoking, EtOH or drugs     FAMILY HISTORY:  Noncontributory     Allergies  sulfa drugs (Short breath)  adhesives (Hives)  PC Pen VK (Other (Mild to Mod))    MEDICATIONS  (STANDING):  acetaminophen   IVPB .. 750 milliGRAM(s) IV Intermittent every 6 hours  azithromycin   Tablet 500 milliGRAM(s) Oral daily  cefTRIAXone   IVPB 1000 milliGRAM(s) IV Intermittent every 24 hours  cholecalciferol 1000 Unit(s) Oral daily  cyanocobalamin 1000 MICROGram(s) Oral daily  donepezil 10 milliGRAM(s) Oral at bedtime  guaiFENesin Oral Liquid (Sugar-Free) 200 milliGRAM(s) Oral every 6 hours  lidocaine   4% Patch 1 Patch Transdermal every 24 hours  memantine 10 milliGRAM(s) Oral two times a day  mirtazapine 15 milliGRAM(s) Oral at bedtime    MEDICATIONS  (PRN):  aluminum hydroxide/magnesium hydroxide/simethicone Suspension 30 milliLiter(s) Oral every 4 hours PRN Dyspepsia  melatonin 3 milliGRAM(s) Oral at bedtime PRN Insomnia  ondansetron Injectable 4 milliGRAM(s) IV Push every 8 hours PRN Nausea and/or Vomiting  traMADol 50 milliGRAM(s) Oral every 6 hours PRN Severe Pain (7 - 10)  traMADol 25 milliGRAM(s) Oral every 6 hours PRN Moderate Pain (4 - 6)     REVIEW OF SYSTEMS:  CONSTITUTIONAL:  No Fever or chills  HEENT:  No diplopia or blurred vision.  No sore throat or runny nose.  CARDIOVASCULAR:  No chest pain   RESPIRATORY:  No cough, shortness of breath, PND or orthopnea.  GASTROINTESTINAL:  No nausea, vomiting or diarrhea.  GENITOURINARY:  No dysuria, frequency or urgency. No Blood in urine  MUSCULOSKELETAL:  no joint aches, no muscle pain  SKIN:  No change in skin, hair or nails.    Physical Exam:  Vital Signs Last 24 Hrs  T(C): 36.1 (27 May 2024 12:35), Max: 36.6 (27 May 2024 09:10)  T(F): 97 (27 May 2024 12:35), Max: 97.9 (27 May 2024 09:10)  HR: 76 (27 May 2024 12:35) (76 - 87)  BP: 101/50 (27 May 2024 12:35) (85/52 - 101/50)  BP(mean): --  RR: 24 (27 May 2024 12:35) (16 - 24)  SpO2: 100% (27 May 2024 12:35) (96% - 100%)  Parameters below as of 27 May 2024 12:35  Patient On (Oxygen Delivery Method): nasal cannula  O2 Flow (L/min): 2  Height (cm): 149.9 ( @ 09:10)  Weight (kg): 49.9 ( @ 09:10)  BMI (kg/m2): 22.2 ( @ 09:10)  BSA (m2): 1.43 ( @ 09:10)  GEN: NAD  HEENT: normocephalic and atraumatic. EOMI. PERRL.    NECK: Supple.  No lymphadenopathy   LUNGS: Crackles bilaterally throughout   HEART: Regular rate and rhythm  ABDOMEN: Soft, nontender, and nondistended.    : No CVA tenderness  EXTREMITIES: Without edema.  NEUROLOGIC: grossly intact.  PSYCHIATRIC: Awake and alert but not oriented   SKIN: No rash     Labs:      138  |  103  |  23  ----------------------------<  144<H>  3.9   |  29  |  1.10    Ca    9.3      27 May 2024 09:30  Mg     2.6         TPro  6.9  /  Alb  2.6<L>  /  TBili  0.8  /  DBili  x   /  AST  43<H>  /  ALT  33  /  AlkPhos  50                          10.4   18.45 )-----------( 177      ( 27 May 2024 09:30 )             31.7     PT/INR - ( 27 May 2024 09:30 )   PT: 13.1 sec;   INR: 1.12 ratio    PTT - ( 27 May 2024 09:30 )  PTT:29.5 sec  Urinalysis Basic - ( 27 May 2024 09:30 )    Color: Dark Yellow / Appearance: Cloudy / S.026 / pH: x  Gluc: 144 mg/dL / Ketone: Trace mg/dL  / Bili: Negative / Urobili: 1.0 mg/dL   Blood: x / Protein: 100 mg/dL / Nitrite: Negative   Leuk Esterase: Moderate / RBC: 1 /HPF / WBC 15 /HPF   Sq Epi: x / Non Sq Epi: x / Bacteria: Few /HPF    LIVER FUNCTIONS - ( 27 May 2024 09:30 )  Alb: 2.6 g/dL / Pro: 6.9 g/dL / ALK PHOS: 50 U/L / ALT: 33 U/L / AST: 43 U/L / GGT: x           CARDIAC MARKERS ( 27 May 2024 09:30 )  x     / x     / 90 U/L / x     / x        SARS-CoV-2: NotDetec (24 @ 09:30)    RECENT CULTURES:   @ 09:30       [facility]    All imaging and other data have been reviewed.  < from: CT Chest No Cont (24 @ 10:55) >  IMPRESSION:  1.  RIGHT lateral 7th rib fracture (309/141). Minimal anterior RIGHT   6th,7th rib deformities/possible additional nondisplaced fractures   (309-174, 191).  2.  Age indeterminant LEFT pubic fractures favors chronic etiology (area   degraded by motion unsharpness and orthopedic beam hardening artifact).  3.  Bilateral lung patchy airspace and groundglass opacities. Chronic   underlying lung disease cannot be distinguished from multifocal   bronchopneumonia or combination of both. Small dependent pleural effusions  4.  Prominent/normal size spleen- wandering spleen,  enlarged from prior   exams. Clinical correlation with regard to LUQ pain recommended      Assessment and Plan:   89 y/o woman with PMH of ILD/pulmonary fibrosis, lung granulomatosis disease (s/p granuloma resection ), myelodysplastic syndrome, pernicious anemia and dementia was admitted   after an un unwitnessed fall at home and productive cough. Daughter is at the bedside. No fever. Has some sough and SOB on o2 NC now.   Leukocytosis 18.45   CT chest with GGO in both sides with underlying fibrosis and possibly multifocal bronchopneumonia, also she had multiple rib fractures. UA with 15 WBC otherwise negative.     # Pneumonia?   # ILD  # UTI?  # Fall and rib fractures     - Will follow blood cultures   - Will follow UC  - Agree with ceftriaxone 1gm daily and azithromycin 500mg daily  - Send MRSA PCR  - Send Legionella and strep urinary ag  - Will monitor WBC partly reactive and dehydration   - Fall work up as per primary team     Thank you for courtesy of this consult.     Will follow.  Discussed with the primary team.     Sara Moya MD  Division of Infectious Diseases   Please call ID service at [phone] with any question.    75 minutes spent on total encounter assessing patient, examination, chart review, counseling and coordinating care by the attending physician/nurse/care manager.

## 2024-05-27 NOTE — H&P ADULT - PROBLEM SELECTOR PLAN 4
No reported h/o CHF  - pro-BNP 5300 on admission  - CT Chest: +small dependent pleural effusions bilaterally  - Takes lasix 20mg at home for leg swelling   - Hold lasix given soft BPs No reported h/o CHF  - pro-BNP 5300 on admission  - CT Chest: +small dependent pleural effusions bilaterally  - Takes lasix 20mg at home for leg swelling   - Hold lasix given soft BPs  - f/u BNP CT A/P: Prominent/normal size spleen- wandering spleen,  enlarged from prior exams. Clinical correlation with regard to LUQ pain recommended  - Likely will req outpatient f/u  - Trend hgb and abdominal closely

## 2024-05-27 NOTE — PATIENT PROFILE ADULT - FALL HARM RISK - HARM RISK INTERVENTIONS
Assistance with ambulation/Assistance OOB with selected safe patient handling equipment/Communicate Risk of Fall with Harm to all staff/Discuss with provider need for PT consult/Monitor gait and stability/Provide patient with walking aids - walker, cane, crutches/Reinforce activity limits and safety measures with patient and family/Review medications for side effects contributing to fall risk/Sit up slowly, dangle for a short time, stand at bedside before walking/Tailored Fall Risk Interventions/Toileting schedule using arm’s reach rule for commode and bathroom/Visual Cue: Yellow wristband and red socks/Bed in lowest position, wheels locked, appropriate side rails in place/Call bell, personal items and telephone in reach/Instruct patient to call for assistance before getting out of bed or chair/Non-slip footwear when patient is out of bed/[place] to call system/Physically safe environment - no spills, clutter or unnecessary equipment/Purposeful Proactive Rounding/Room/bathroom lighting operational, light cord in reach

## 2024-05-27 NOTE — ED ADULT NURSE NOTE - OBJECTIVE STATEMENT
Pt received in bed alert and pleasantly confuse with the c/o unwitnessed fall around 3am today. Pt has a productive cough with thick green mucus and scattered Rhonchi. Pt has large hematoma to right forehead with a small open. Pt has a large bruise and hematoma to right upper arm and abrasion to right knee. Pt also has bruising at different stages of healing over entire upper and lower extremity. Pt BP was low and blood specimen obtained and sent to lab. 500 ml NS saline given and tolerated well. Pt also hypoxic and 2 L NC given to tolerating well. Nursing care ongoing and safety maintained

## 2024-05-27 NOTE — ED PROVIDER NOTE - OBJECTIVE STATEMENT
Patient is an 88-year-old female who presents to the emergency room status post fall with reported hypotension.  Past medical history of Boop, uterine fibroids, lung granulomatosis disease granuloma removed in 1995 at [facility], interstitial lung disease, history of intestinal obstruction, lymphadenopathy, mild dysplastic syndrome, dementia, OA, osteoporosis, pernicious anemia, pulmonary fibrosis,.  History is limited as patient suffers from dementia.  Per EMS family has cameras in the house.  When they checked the cameras this morning they found the patient was on the ground.  They are able to see that the patient fell at around 3 AM.  The aide was called to come to the house early to help lift the patient back to bed.  EMS was then called and patient was taken to the hospital.  They does report that patient has had a worsening cough over the last 2 weeks.

## 2024-05-27 NOTE — H&P ADULT - ATTENDING COMMENTS
87 y/o F with PMHx ILD (not on home O2), pulmonary fibrosis, lung granulomatosis disease (s/p granuloma resection 1995), intestinal obstruction, mild dysplastic syndrome, pernicious anemia, dementia, OA, osteoporosis presenting to the ED s/p unwitnessed fall at home and productive cough. Admitted for PNA, UTI.    Pt started on IV ceftriaxone, and azithromycin, ID consulted, will f/u recs, monitor WBC and fever curve, f/u strep, legionella, sputum, urine cultures.  Robitussin, tessalon perles prn cough.  No fractures on imaging, PT eval.  Encourage PO intake.      Bradley Lazar, Attending Physician

## 2024-05-28 NOTE — CARE COORDINATION ASSESSMENT. - NSPASTMEDSURGHISTORY_GEN_ALL_CORE_FT
PAST MEDICAL & SURGICAL HISTORY:  BOOP (bronchiolitis obliterans with organizing pneumonia)  1991      MDS (myelodysplastic syndrome)  2013, found incidentally      Intestinal obstruction  1999      Lichen of skin  vaginal area, 2012      Pernicious anemia  2000      Osteoporosis      Osteoarthritis      Pulmonary fibrosis  8/13      Granulomatous lung disease  1995 at [facility] had granuloma removed      Lymphadenopathy, inguinal  right 1988, removed surgically      Fibroids  1978      S/P hip replacement  left 2013      S/P hip replacement  right 2004      S/P hysterectomy  Left ovary left in place, 1978      S/P tubal ligation  1976      Sprain of right rotator cuff capsule, initial encounter      Mild dementia      ILD (interstitial lung disease)      Elective surgery  (VAT and biopsy, 2014)

## 2024-05-28 NOTE — PROGRESS NOTE ADULT - PROBLEM SELECTOR PLAN 4
CT A/P: Prominent/normal size spleen- wandering spleen,  enlarged from prior exams. Clinical correlation with regard to LUQ pain recommended  - Likely will req outpatient f/u  - Trend hgb and abdominal closely  - ordered EBV serology with weakness and enlarged spleen

## 2024-05-28 NOTE — DISCHARGE NOTE PROVIDER - NSDCMRMEDTOKEN_GEN_ALL_CORE_FT
cyanocobalamin 1000 mcg oral tablet: 1 tab(s) orally  D3 25 mcg (1000 intl units) oral tablet: 1 tab(s) orally once a day  DONEPEZIL 10MG TAB: TAKE 1 TABLET BY MOUTH ONCE A DAY  folic acid 0.4 mg oral tablet: 1 tab(s) orally  Lasix 20 mg oral tablet: 1 tab(s) orally  memantine 21 mg oral capsule, extended release: 1 cap(s) orally once a day  mirtazapine 15 mg oral tablet: 1 tab(s) orally

## 2024-05-28 NOTE — PHYSICAL THERAPY INITIAL EVALUATION ADULT - PERTINENT HX OF CURRENT PROBLEM, REHAB EVAL
As per EMR "89 y/o F with PMHx ILD (not on home O2), pulmonary fibrosis, lung granulomatosis disease (s/p granuloma resection 1995), intestinal obstruction, mild dysplastic syndrome, pernicious anemia, dementia, OA, osteoporosis presenting to the ED s/p unwitnessed fall at home and productive cough. Admitted for PNA, UTI." CT Chest 5/27 @1055: "RIGHT lateral 7th rib fracture (309/141). Minimal anterior RIGHT 6th,7th rib deformities/possible additional nondisplaced fractures (309-174, 191).2.  Age indeterminant LEFT pubic fractures favors chronic etiology (area degraded by motion unsharpness and orthopedic beam hardening artifact)." XR R Humerus, R Shoulder, R knee (-) for acute fracture; CT Head (-) for acute findings.

## 2024-05-28 NOTE — PHYSICAL THERAPY INITIAL EVALUATION ADULT - NSACTIVITYREC_GEN_A_PT
Bed mobility 1-person assist; OOB to chair (recommend recliner/positioning for comfort) 1-person +RW as tolerated (limited by pain, suppl. O2); OOB to commode 1-person +RW as tolerated; home exercise program.

## 2024-05-28 NOTE — CHART NOTE - NSCHARTNOTEFT_GEN_A_CORE
Called by RN as patient was persistently tachypneic. Patient seen and examined at bedside. Patient endorses persistent shortness of breath, cough, and states she has pain on inspiration. She states she has been immobile for much of the past week. She has not been on dvt ppx because of having hematomas. She denies any f/c, cp, or abd pain.    Vital Signs Last 24 Hrs  T(C): 36.4 (28 May 2024 11:30), Max: 36.9 (28 May 2024 04:11)  T(F): 97.5 (28 May 2024 11:30), Max: 98.5 (28 May 2024 04:11)  HR: 97 (28 May 2024 14:43) (77 - 99)  BP: 120/76 (28 May 2024 13:45) (95/53 - 121/53)  BP(mean): --  RR: 19 (28 May 2024 13:45) (18 - 19)  SpO2: 92% (28 May 2024 14:43) (92% - 95%)    Parameters below as of 28 May 2024 14:43  Patient On (Oxygen Delivery Method): nasal cannula, 4LPM    CONSTITUTIONAL: in mild distress, tired, on NC  RESP: in mild respiratory distress, use of accessory muscles; course breath sounds and b/l wheezing at lower lobes  CV: RRR, +S1S2, no peripheral edema  GI: Soft, NT, ND    Plan:  - s/w duoneb x1  - f/u cta chest and abg  - wells score 6  - Discussed with senior, who agrees with plan above  - RN to call with changes.

## 2024-05-28 NOTE — PROGRESS NOTE ADULT - ATTENDING COMMENTS
89 y/o F with PMHx ILD (not on home O2), pulmonary fibrosis, lung granulomatosis disease (s/p granuloma resection 1995), intestinal obstruction, mild dysplastic syndrome, pernicious anemia, dementia, OA, osteoporosis presenting to the ED s/p unwitnessed fall at home and productive cough. Admitted for PNA, UTI.  - urine legionalla neg, d/c azithromycin  - strep positive, continue ceftriaxone  - ID recs appreciated  - f/u TTE  - start lasix, wean off supplemental O2 as tolerated    D/w resident  Rest of care per plan above

## 2024-05-28 NOTE — CARE COORDINATION ASSESSMENT. - OTHER PERTINENT DISCHARGE PLANNING INFORMATION:
CM met with the patient at the bedside and explained role of CM and transition planning. Patient A&Ox2. Patient was able to provide information regarding living arrangements. Private hire aide Kacie at the bedside to provide additional information. Patient lives alone in a condo with two flight of stairs to the bedroom. Per aide approx 20 stairs. Patient stated ambulates independently. Aide stated she helps the patient with ADL's. Aide stated she is the patient's private aide Monday through Thursday 8a-4pm and that she has an aide Saturday/Sunday 8a-4pm. CM made contact with the patient's daughter/HCP Kamille who confirmed information above. Patient has a RW. No home O2 PTA.     CM provided direct contact/resource folder and remains available.  Private hire aide Mon-Thursday 8a-4pm and Sat/Sun 8-4pm.  Daughter monitors through camera. + 2 flights of stairs to the bedroom. Reported to be independent with ambulation. Has a RW. No home care services PTA.

## 2024-05-28 NOTE — PROGRESS NOTE ADULT - SUBJECTIVE AND OBJECTIVE BOX
[facility]  INFECTIOUS DISEASES   [address] [place] [address]  Tel: [phone]     Fax: [phone]  ========================================================  MD Massimo Barakat Kaushal, MD Cho, Michelle, MD Sunjit, Jaspal, MD  ========================================================    [id number]  VALORIE PLATA     Follow up: Pneumonia     Feels better today, more awake. Has less shortness of beath.   No fever.     PAST MEDICAL & SURGICAL HISTORY:  Fibroids  1978  Lymphadenopathy, inguinal  right 1988, removed surgically  Granulomatous lung disease  1995 at [facility] had granuloma removed  Pulmonary fibrosis  8/13  Osteoarthritis  Osteoporosis  Pernicious anemia  2000  Lichen of skin  vaginal area, 2012  Intestinal obstruction  1999  MDS (myelodysplastic syndrome)  2013, found incidentally  BOOP (bronchiolitis obliterans with organizing pneumonia)  1991  ILD (interstitial lung disease)  Mild dementia  Sprain of right rotator cuff capsule, initial encounter  S/P tubal ligation  1976  S/P hysterectomy  Left ovary left in place, 1978  S/P hip replacement  right 2004  S/P hip replacement  left 2013  Elective surgery  (VAT and biopsy, 2014)    Social Hx: No current smoking, EtOH or drugs     FAMILY HISTORY:  Noncontributory     Allergies  sulfa drugs (Short breath)  adhesives (Hives)  PC Pen VK (Other (Mild to Mod))    MEDICATIONS  (STANDING):  acetaminophen   IVPB .. 750 milliGRAM(s) IV Intermittent every 6 hours  azithromycin   Tablet 500 milliGRAM(s) Oral daily  cefTRIAXone   IVPB 1000 milliGRAM(s) IV Intermittent every 24 hours  cholecalciferol 1000 Unit(s) Oral daily  cyanocobalamin 1000 MICROGram(s) Oral daily  donepezil 10 milliGRAM(s) Oral at bedtime  guaiFENesin Oral Liquid (Sugar-Free) 200 milliGRAM(s) Oral every 6 hours  lidocaine   4% Patch 1 Patch Transdermal every 24 hours  memantine 10 milliGRAM(s) Oral two times a day  mirtazapine 15 milliGRAM(s) Oral at bedtime    MEDICATIONS  (PRN):  aluminum hydroxide/magnesium hydroxide/simethicone Suspension 30 milliLiter(s) Oral every 4 hours PRN Dyspepsia  melatonin 3 milliGRAM(s) Oral at bedtime PRN Insomnia  ondansetron Injectable 4 milliGRAM(s) IV Push every 8 hours PRN Nausea and/or Vomiting  traMADol 50 milliGRAM(s) Oral every 6 hours PRN Severe Pain (7 - 10)  traMADol 25 milliGRAM(s) Oral every 6 hours PRN Moderate Pain (4 - 6)     REVIEW OF SYSTEMS:  CONSTITUTIONAL:  No Fever or chills  HEENT:  No diplopia or blurred vision.  No sore throat or runny nose.  CARDIOVASCULAR:  No chest pain   RESPIRATORY:  No cough, shortness of breath, PND or orthopnea.  GASTROINTESTINAL:  No nausea, vomiting or diarrhea.  GENITOURINARY:  No dysuria, frequency or urgency. No Blood in urine  MUSCULOSKELETAL:  no joint aches, no muscle pain  SKIN:  No change in skin, hair or nails.    Physical Exam:  Vital Signs Last 24 Hrs  T(C): 36.9 (28 May 2024 04:11), Max: 36.9 (28 May 2024 04:11)  T(F): 98.5 (28 May 2024 04:11), Max: 98.5 (28 May 2024 04:11)  HR: 77 (28 May 2024 04:11) (63 - 85)  BP: 95/53 (28 May 2024 04:11) (79/44 - 122/56)  BP(mean): --  RR: 19 (28 May 2024 04:11) (18 - 19)  SpO2: 93% (28 May 2024 04:11) (93% - 98%)  Parameters below as of 28 May 2024 04:11  Patient On (Oxygen Delivery Method): nasal cannula  O2 Flow (L/min): 4  GEN: NAD  HEENT: normocephalic and atraumatic. EOMI. PERRL.    NECK: Supple.  No lymphadenopathy   LUNGS: Crackles bilaterally throughout   HEART: Regular rate and rhythm  ABDOMEN: Soft, nontender, and nondistended.    : No CVA tenderness  EXTREMITIES: Without edema.  NEUROLOGIC: grossly intact.  PSYCHIATRIC: Awake and alert but not oriented   SKIN: No rash     Labs:                        8.4    12.22 )-----------( 144      ( 28 May 2024 07:36 )             26.0     05-28    140  |  108  |  25<H>  ----------------------------<  122<H>  4.0   |  29  |  1.00    Ca    8.6      28 May 2024 07:36  Mg     2.6     05-27    TPro  5.7<L>  /  Alb  2.1<L>  /  TBili  0.6  /  DBili  x   /  AST  19  /  ALT  22  /  AlkPhos  45  05-28    WBC Count: 12.22 K/uL (05-28-24 @ 07:36)  WBC Count: 18.45 K/uL (05-27-24 @ 09:30)    Creatinine: 1.00 mg/dL (05-28-24 @ 07:36)  Creatinine: 1.10 mg/dL (05-27-24 @ 09:30)    Procalcitonin: 0.32 ng/mL (05-28-24 @ 07:36)     SARS-CoV-2: NotDetec (05-27-24 @ 09:30)      All imaging and other data have been reviewed.  < from: CT Chest No Cont (05.27.24 @ 10:55) >  IMPRESSION:  1.  RIGHT lateral 7th rib fracture (309/141). Minimal anterior RIGHT   6th,7th rib deformities/possible additional nondisplaced fractures   (309-174, 191).  2.  Age indeterminant LEFT pubic fractures favors chronic etiology (area   degraded by motion unsharpness and orthopedic beam hardening artifact).  3.  Bilateral lung patchy airspace and groundglass opacities. Chronic   underlying lung disease cannot be distinguished from multifocal   bronchopneumonia or combination of both. Small dependent pleural effusions  4.  Prominent/normal size spleen- wandering spleen,  enlarged from prior   exams. Clinical correlation with regard to LUQ pain recommended      Assessment and Plan:   87 y/o woman with PMH of ILD/pulmonary fibrosis, lung granulomatosis disease (s/p granuloma resection 1995), myelodysplastic syndrome, pernicious anemia and dementia was admitted   after an un unwitnessed fall at home and productive cough. Daughter is at the bedside. No fever. Has some sough and SOB on o2 NC now.   Leukocytosis 18.45   CT chest with GGO in both sides with underlying fibrosis and possibly multifocal bronchopneumonia, also she had multiple rib fractures. UA with 15 WBC otherwise negative.     # Pneumonia?   # ILD  # UTI?  # Fall and rib fractures     - Blood cultures NGTD   - Will follow UC  - Urinary strep Ag is positive   - Legionella U ag is negative   - Continue ceftriaxone 1gm daily  - Can treat for total 5days   - Can stop Azithromycin   - Will monitor WBC trending down 18-->12    Will follow.    Sara Moya MD  Division of Infectious Diseases   Please call ID service at [phone] with any question.    50 minutes spent on total encounter assessing patient, examination, chart review, counseling and coordinating care by the attending physician/nurse/care manager.

## 2024-05-28 NOTE — PHYSICAL THERAPY INITIAL EVALUATION ADULT - GAIT TRAINING, PT EVAL
Patient will ambulate with minimal assist for 50 feet with use of appropriate assistive device to be able to negotiate home environment, within 3 to 5 sessions.

## 2024-05-28 NOTE — DISCHARGE NOTE PROVIDER - NSDCCPCAREPLAN_GEN_ALL_CORE_FT
PRINCIPAL DISCHARGE DIAGNOSIS  Diagnosis: Sepsis  Assessment and Plan of Treatment:       SECONDARY DISCHARGE DIAGNOSES  Diagnosis: Pneumonia  Assessment and Plan of Treatment:     Diagnosis: Fall with injury  Assessment and Plan of Treatment:     Diagnosis: Imaging abnormality  Assessment and Plan of Treatment:      PRINCIPAL DISCHARGE DIAGNOSIS  Diagnosis: Sepsis  Assessment and Plan of Treatment: You were found to have sepsis secondary to pneumonia. You were treated with IV antibiotics and your infection resolved.      SECONDARY DISCHARGE DIAGNOSES  Diagnosis: Pneumonia  Assessment and Plan of Treatment: You were found to have pneumonia which caused sepsis. You were treated with IV antibiotics and your infection resolved.    Diagnosis: Fall with injury  Assessment and Plan of Treatment: You were found to have broken ribs on imaging. You were treated with pain medications to help ease your inspirational chest pain.    Diagnosis: Imaging abnormality  Assessment and Plan of Treatment:

## 2024-05-28 NOTE — PROGRESS NOTE ADULT - ASSESSMENT
87 y/o F with PMHx ILD (not on home O2), pulmonary fibrosis, lung granulomatosis disease (s/p granuloma resection 1995), intestinal obstruction, mild dysplastic syndrome, pernicious anemia, dementia, OA, osteoporosis presenting to the ED s/p unwitnessed fall at home and productive cough. Admitted for PNA, UTI.

## 2024-05-28 NOTE — PROGRESS NOTE ADULT - PROBLEM SELECTOR PLAN 6
AOx2 at baseline. At baseline on admission   - CT Head neg for acute stroke  - Cont donepezil, mirtazepine  - Memantine ER 21mg non-formulary --> 10mg BID

## 2024-05-28 NOTE — PHYSICAL THERAPY INITIAL EVALUATION ADULT - MANUAL MUSCLE TESTING RESULTS, REHAB EVAL
rib fractures R 6,7: ; B UE and B LE grossly < or = to 3-/5 via functional assessment, no resistance applied./grossly assessed due to

## 2024-05-28 NOTE — CARE COORDINATION ASSESSMENT. - NSDCPLANSERVICES_GEN_ALL_CORE
PT eval pending.   Patient lives alone- has an aide 8-4pm Monday-Thursday. Sat/Sun 8-4pm then monitored through cameras./Anticipated Needs Unclear at Present

## 2024-05-28 NOTE — PHYSICAL THERAPY INITIAL EVALUATION ADULT - GENERAL OBSERVATIONS, REHAB EVAL
Patient chart reviewed, events noted. Patient cleared to be seen for therapy by RN prior to session. Patient received semi-reclined in bed, NAD. Agreeable to PT at this time.

## 2024-05-28 NOTE — PROGRESS NOTE ADULT - PROBLEM SELECTOR PLAN 1
Productive cough, weakness, decreased PO intake x 1week following large social gathering. Likely CAP Bacterial vs. Viral  - Tachypnea, Leukocytosis, elevated lactate, hypotensive on admission, +imaging meets sepsis criteria  - CT Chest:  Bilateral lung patchy airspace and groundglass opacities. Chronic underlying lung disease cannot be distinguished from multifocal bronchopneumonia or combination of both. Small dependent pleural effusions  - rvp negative  - strep ag +, leg -  - c/w ceftriaxone  - Robitussin  - ID (Dr. Dozier) consult

## 2024-05-28 NOTE — CARE COORDINATION ASSESSMENT. - MET/SPOKE WITH
private hire darvin Hester at the bedside [phone]  Patient provided CM with permission to obtain information from aide if needed.  Spoke to the patient's daughter Kamille who is listed as the health care proxy/Kamille confirmed./patient/daughter

## 2024-05-28 NOTE — PROGRESS NOTE ADULT - PROBLEM SELECTOR PLAN 3
No reported h/o CHF  - pro-BNP 5300 on admission  - CT Chest: +small dependent pleural effusions bilaterally  - Takes lasix 20mg at home for leg swelling. Restart in AM with hold parameters   - f/u TTE

## 2024-05-28 NOTE — CARE COORDINATION ASSESSMENT. - ASSESSMENT CONCERNS TO BE ADDRESSED
Per EMR: 89 y/o F with PMHx ILD (not on home O2), pulmonary fibrosis, lung granulomatosis disease (s/p granuloma resection 1995), intestinal obstruction, mild dysplastic syndrome, pernicious anemia, dementia, OA, osteoporosis presenting to the ED s/p unwitnessed fall at home and productive cough. Admitted for PNA, UTI.    Patient is identified as a CMS STAR patient. Transition care management program yellow contact card has been provided./discharge planning

## 2024-05-28 NOTE — DISCHARGE NOTE PROVIDER - NSDCFUSCHEDAPPT_GEN_ALL_CORE_FT
Percy Morrow  [facility]  NEUROLOGY [address]  Scheduled Appointment: 06/11/2024    Lorrie Arenas  [facility]  INT[facility] [address] [facility]  Scheduled Appointment: 08/06/2024     Lorrie Arenas  [facility]  INT[facility] [address] [facility]  Scheduled Appointment: 08/06/2024

## 2024-05-28 NOTE — PROGRESS NOTE ADULT - PROBLEM SELECTOR PLAN 2
s/p unwitnessed fall at home alone this AM. Hematomas of R frontotemporal region and R shoulder. + rib fractures  - May be 2/2 acute infection + dehydration from decreased PO intake. Denies LOC though unclear  - CT Chest: acute R 6th and 7th rib fractures (see report)  - CT Head: Right anterolateral extra calvarial soft tissue swelling/hematoma. No hemorrhage  - Pain management: Ofirmev x4 doses, Start PO tylenol tomorrow, lidocaine patch, tramadol 25mg prn mod pain, 50mg severe pain  - Check orthostatics   - fall risk protocol

## 2024-05-28 NOTE — PROGRESS NOTE ADULT - PROBLEM SELECTOR PLAN 5
Septic 2/2 PNA + UTI.  asymptomatic  - UA: cloudy, +LE, 15 WBC, few bacteria   - Cont Ceftriaxone  - f/u Urine cx  - I/Os, trend renal function  - encourage PO intake

## 2024-05-28 NOTE — DISCHARGE NOTE PROVIDER - HOSPITAL COURSE
FROM ADMISSION H+P:   HPI:  87 y/o F with PMHx ILD (not on home O2), pulmonary fibrosis, lung granulomatosis disease (s/p granuloma resection 1995), intestinal obstruction, mild dysplastic syndrome, pernicious anemia, dementia, OA, osteoporosis presenting to the ED s/p unwitnessed fall at home and productive cough. Patient has dementia, majority of history obtained from HHA and daughter at bedside. Patient attended a graduation 8 days ago, and returned with a productive cough four days later. Cough has been worsening, and patient has had decreased PO intake and fatigue. Patient lives alone with a HHA for several hour a day. Around 3am this morning, patient was found on the ground via cameras the family had setup and requested the HHA assist her. Pt unsure of events leading to fall. Denies LOC. Given her state, the aid called EMS. Currently, patient reports cough and mid-sternal chest pain worse with coughing and movement. Denies headache, blurry vision, SOB, abd pain.      ED course  Vitals: T 97.9, HR 87, BP 85/52 --> 101/50, RR 16, SpO2 99% RA  Labs:  WBC 18, Hgb 10.4, lactate 2.1 --> 1.3, pro-BNP 5349  UA: cloudy, mod LE, +blood, 15 WBC, few bacteria   EKG: NSR at 86 bpm, PVCs, ST abnormality in leads II and III    Given: Ceftriaxone, Azithromycin, Duoneb   CT Head:  No acute intracranial hemorrhage, brain edema, or mass effect.  No displaced calvarial fracture.  Right anterolateral extra calvarial soft tissue swelling/hematoma.    CT Neck:   No acute fracture or traumatic subluxation.  No prevertebral soft tissue swelling.  Degenerative changes.    CT Chest, Abd, Pelvis:   1.  RIGHT lateral 7th rib fracture (309/141). Minimal anterior RIGHT   6th,7th rib deformities/possible additional nondisplaced fractures   (309-174, 191).  2.  Age indeterminant LEFT pubic fractures favors chronic etiology (area   degraded by motion unsharpness and orthopedic beam hardening artifact).  3.  Bilateral lung patchy airspace and groundglass opacities. Chronic   underlying lung disease cannot be distinguished from multifocal   bronchopneumonia or combination of both. Small dependent pleural effusions  4.  Prominent/normal size spleen- wandering spleen,  enlarged from prior   exams. Clinical correlation with regard to LUQ pain recommended    XR Humerus, Right: no fracture. Reversed right shoulder replacement  XR Knee, Right: no fracture, no effusion (27 May 2024 13:26)      HOSPITAL COURSE: Pt was admitted for sepsis 2/2 PNA. Pt was started on ceftriaxone and azithromycin, O2 by nasal canula and supportive treatment.  ID was consulted. Strept ag was positive, RVP was negative, MRSA PCR ------- and legionella ag negative as well and pt continued only on ceftriaxone. Blood cultures were ---------Pt completed 5 days of ceftriaxone, leukocytosis improved and was weaned off supplemental oxygen.       Patient is stable for discharge as per primary medical team and consultants.    PT consulted, recommends discharge ______    Patient showed improvement throughout hospitalization. Patient was seen and examined on day of discharge. Patient was medically optimized for discharge with close outpatient folllow up.    VITALS:   T(C): 36.9 (05-28-24 @ 04:11), Max: 36.9 (05-28-24 @ 04:11)  HR: 77 (05-28-24 @ 04:11) (63 - 85)  BP: 95/53 (05-28-24 @ 04:11) (79/44 - 122/56)  RR: 19 (05-28-24 @ 04:11) (18 - 24)  SpO2: 93% (05-28-24 @ 04:11) (93% - 100%)    PHYSICAL EXAM:      CONSULTANTS:   ACE Moya     TIME SPENT:  I, the attending physician, was physically present for the key portions of the evaluation and management (E/M) service provided. The total amount of time spent reviewing the hospital notes, laboratory values, imaging findings, assessing/counseling the patient, discussing with consultant physicians, social work, nursing staff was -- minutes    ---  FINAL DISCHARGE DIAGNOSIS LIST:  Please see last daily progress note for final discharge diagnoses    ---  Primary care provider was made aware of plan for discharge:      [  ] NO     [  ] YES   FROM ADMISSION H+P:   HPI:  87 y/o F with PMHx ILD (not on home O2), pulmonary fibrosis, lung granulomatosis disease (s/p granuloma resection 1995), intestinal obstruction, mild dysplastic syndrome, pernicious anemia, dementia, OA, osteoporosis presenting to the ED s/p unwitnessed fall at home and productive cough. Patient has dementia, majority of history obtained from HHA and daughter at bedside. Patient attended a graduation 8 days ago, and returned with a productive cough four days later. Cough has been worsening, and patient has had decreased PO intake and fatigue. Patient lives alone with a HHA for several hour a day. Around 3am this morning, patient was found on the ground via cameras the family had setup and requested the HHA assist her. Pt unsure of events leading to fall. Denies LOC. Given her state, the aid called EMS. Currently, patient reports cough and mid-sternal chest pain worse with coughing and movement. Denies headache, blurry vision, SOB, abd pain.      ED course  Vitals: T 97.9, HR 87, BP 85/52 --> 101/50, RR 16, SpO2 99% RA  Labs:  WBC 18, Hgb 10.4, lactate 2.1 --> 1.3, pro-BNP 5349  UA: cloudy, mod LE, +blood, 15 WBC, few bacteria   EKG: NSR at 86 bpm, PVCs, ST abnormality in leads II and III    Given: Ceftriaxone, Azithromycin, Duoneb   CT Head:  No acute intracranial hemorrhage, brain edema, or mass effect.  No displaced calvarial fracture.  Right anterolateral extra calvarial soft tissue swelling/hematoma.    CT Neck:   No acute fracture or traumatic subluxation.  No prevertebral soft tissue swelling.  Degenerative changes.    CT Chest, Abd, Pelvis:   1.  RIGHT lateral 7th rib fracture (309/141). Minimal anterior RIGHT   6th,7th rib deformities/possible additional nondisplaced fractures   (309-174, 191).  2.  Age indeterminant LEFT pubic fractures favors chronic etiology (area   degraded by motion unsharpness and orthopedic beam hardening artifact).  3.  Bilateral lung patchy airspace and groundglass opacities. Chronic   underlying lung disease cannot be distinguished from multifocal   bronchopneumonia or combination of both. Small dependent pleural effusions  4.  Prominent/normal size spleen- wandering spleen,  enlarged from prior   exams. Clinical correlation with regard to LUQ pain recommended    XR Humerus, Right: no fracture. Reversed right shoulder replacement  XR Knee, Right: no fracture, no effusion (27 May 2024 13:26)      HOSPITAL COURSE: Pt was admitted for sepsis 2/2 PNA. Pt was started on ceftriaxone and azithromycin, O2 by nasal canula and supportive treatment.  ID was consulted. Strept ag was positive, RVP was negative. Pt completed course of rocephin. Blood cultures were negative. Pt had desaturation and tachypnea and was placed on venti mask. CTA was negative for PE. Pt received IV lasix. Pts O2 requirements continued to increase, and she was placed on NRB. ICU was consulted and GOC discussion occurred and pt was placed on comfort measures only. Pt was started on morphine gtt for inc WOB and tachypnea.    VITALS:   T(C): 36.9 (05-28-24 @ 04:11), Max: 36.9 (05-28-24 @ 04:11)  HR: 77 (05-28-24 @ 04:11) (63 - 85)  BP: 95/53 (05-28-24 @ 04:11) (79/44 - 122/56)  RR: 19 (05-28-24 @ 04:11) (18 - 24)  SpO2: 93% (05-28-24 @ 04:11) (93% - 100%)    PHYSICAL EXAM:      CONSULTANTS:   ACE Aguillon    TIME SPENT:  I, the attending physician, was physically present for the key portions of the evaluation and management (E/M) service provided. The total amount of time spent reviewing the hospital notes, laboratory values, imaging findings, assessing/counseling the patient, discussing with consultant physicians, social work, nursing staff was -- minutes    ---  FINAL DISCHARGE DIAGNOSIS LIST:  Please see last daily progress note for final discharge diagnoses    ---  Primary care provider was made aware of plan for discharge:      [  ] NO     [  ] YES

## 2024-05-28 NOTE — PROGRESS NOTE ADULT - SUBJECTIVE AND OBJECTIVE BOX
Patient is a 88y old  Female who presents with a chief complaint of PNA (27 May 2024 15:41)      Subjective:  INTERVAL HPI/OVERNIGHT EVENTS: Patient seen and examined at bedside. Patient admitted overnight. has mild generalized pain but no major complaints.     MEDICATIONS  (STANDING):  cefTRIAXone   IVPB 1000 milliGRAM(s) IV Intermittent every 24 hours  cholecalciferol 1000 Unit(s) Oral daily  cyanocobalamin 1000 MICROGram(s) Oral daily  donepezil 10 milliGRAM(s) Oral at bedtime  furosemide    Tablet 20 milliGRAM(s) Oral daily  guaiFENesin Oral Liquid (Sugar-Free) 200 milliGRAM(s) Oral every 6 hours  lidocaine   4% Patch 1 Patch Transdermal every 24 hours  memantine 10 milliGRAM(s) Oral two times a day  mirtazapine 15 milliGRAM(s) Oral at bedtime    MEDICATIONS  (PRN):  acetaminophen     Tablet .. 650 milliGRAM(s) Oral every 6 hours PRN Temp greater or equal to 38C (100.4F), Mild Pain (1 - 3)  aluminum hydroxide/magnesium hydroxide/simethicone Suspension 30 milliLiter(s) Oral every 4 hours PRN Dyspepsia  melatonin 3 milliGRAM(s) Oral at bedtime PRN Insomnia  ondansetron Injectable 4 milliGRAM(s) IV Push every 8 hours PRN Nausea and/or Vomiting  traMADol 25 milliGRAM(s) Oral every 6 hours PRN Moderate Pain (4 - 6)  traMADol 50 milliGRAM(s) Oral every 6 hours PRN Severe Pain (7 - 10)      Allergies    sulfa drugs (Short breath)  adhesives (Hives)  PC Pen VK (Other (Mild to Mod))    Intolerances        REVIEW OF SYSTEMS:  CONSTITUTIONAL: No fever or chills  HEENT:  No headache, no sore throat  RESPIRATORY: No cough, wheezing, or shortness of breath  CARDIOVASCULAR: No chest pain, palpitations  GASTROINTESTINAL: No abd pain, nausea, vomiting, or diarrhea  GENITOURINARY: No dysuria, frequency, or hematuria  NEUROLOGICAL: no focal weakness or dizziness  MUSCULOSKELETAL: no myalgias     Objective:  Vital Signs Last 24 Hrs  T(C): 36.9 (28 May 2024 04:11), Max: 36.9 (28 May 2024 04:11)  T(F): 98.5 (28 May 2024 04:11), Max: 98.5 (28 May 2024 04:11)  HR: 77 (28 May 2024 04:11) (63 - 85)  BP: 95/53 (28 May 2024 04:11) (79/44 - 122/56)  BP(mean): --  RR: 19 (28 May 2024 04:11) (18 - 24)  SpO2: 93% (28 May 2024 04:11) (93% - 100%)    Parameters below as of 28 May 2024 04:11  Patient On (Oxygen Delivery Method): nasal cannula  O2 Flow (L/min): 4      GENERAL: NAD, lying in bed comfortably  HEAD:  right temporal hematoma noted  EYES: conjunctiva and sclera clear  ENT: Moist mucous membranes  NECK: No JVD  CHEST/LUNG: diminished lung sonuds heard b/l  HEART: Regular rate and rhythm;  ABDOMEN: Soft, Nontender, Nondistended  EXTREMITIES:  2+ Peripheral Pulses  NERVOUS SYSTEM:  Alert & Oriented X3  MSK: FROM all 4 extremities, full and equal strength  SKIN: No rashes or lesions    LABS:                        8.4    12.22 )-----------( 144      ( 28 May 2024 07:36 )             26.0     CBC Full  -  ( 28 May 2024 07:36 )  WBC Count : 12.22 K/uL  Hemoglobin : 8.4 g/dL  Hematocrit : 26.0 %  Platelet Count - Automated : 144 K/uL  Mean Cell Volume : 111.1 fl  Mean Cell Hemoglobin : 35.9 pg  Mean Cell Hemoglobin Concentration : 32.3 gm/dL  Auto Neutrophil # : 10.95 K/uL  Auto Lymphocyte # : 0.34 K/uL  Auto Monocyte # : 0.48 K/uL  Auto Eosinophil # : 0.04 K/uL  Auto Basophil # : 0.05 K/uL  Auto Neutrophil % : 89.7 %  Auto Lymphocyte % : 2.8 %  Auto Monocyte % : 3.9 %  Auto Eosinophil % : 0.3 %  Auto Basophil % : 0.4 %    28 May 2024 07:36    140    |  108    |  25     ----------------------------<  122    4.0     |  29     |  1.00     Ca    8.6        28 May 2024 07:36    TPro  5.7    /  Alb  2.1    /  TBili  0.6    /  DBili  x      /  AST  19     /  ALT  22     /  AlkPhos  45     28 May 2024 07:36    PT/INR - ( 27 May 2024 09:30 )   PT: 13.1 sec;   INR: 1.12 ratio         PTT - ( 27 May 2024 09:30 )  PTT:29.5 sec  Urinalysis Basic - ( 28 May 2024 07:36 )    Color: x / Appearance: x / SG: x / pH: x  Gluc: 122 mg/dL / Ketone: x  / Bili: x / Urobili: x   Blood: x / Protein: x / Nitrite: x   Leuk Esterase: x / RBC: x / WBC x   Sq Epi: x / Non Sq Epi: x / Bacteria: x      CAPILLARY BLOOD GLUCOSE              RADIOLOGY & ADDITIONAL TESTS:    Personally reviewed.     Consultant(s) Notes Reviewed:  [x] YES  [ ] NO

## 2024-05-28 NOTE — CARE COORDINATION ASSESSMENT. - NSCAREPROVIDERS_GEN_ALL_CORE_FT
CARE PROVIDERS:  Accepting Physician: Bradley Lazar  Administration: Britton Ford  Administration: Karan Guo  Administration: Miki Do  Admitting: Bradley Lazar  Attending: Bradley Lazar  Case Management: Patti Suazo  Consultant: Sara Moya  Covering Team: Mark Francisco  Covering Team: Sandra Preston  ED Attending: Pau Helms  ED Nurse: Nataly Morrow  HIM/Billing & Coding: Kinga Puente  Nurse: Dayami Ludwig  Nurse: Claudine Cantu  Oncology: Steve Rodriguez  Outpatient Provider: Caro Cuevas  Override: Miguel Gannon  Override: Akiko Gil  Override: Jocelynn Escobar  PCA/Nursing Assistant: Vamsi Turner  PCA/Nursing Assistant: Serenity Gonzales  Primary Team: Nima Jiang  Primary Team: Alesha Simmons  Primary Team: Johnny Villavicencio  Primary Team: Shon Alexander  Registered Dietitian: Valeria Puentes  Respiratory Therapy: Irma Alfredo  : Ramila Hansen  : George Dailey  Team: [facility], Team

## 2024-05-28 NOTE — PHYSICAL THERAPY INITIAL EVALUATION ADULT - PATIENT/FAMILY AGREES WITH PLAN
pt stepdaughter at bedside, explained role of PT and recommendations, expressed good understanding/yes

## 2024-05-28 NOTE — PHYSICAL THERAPY INITIAL EVALUATION ADULT - TRANSFER TRAINING, PT EVAL
Patient will perform stand<>sit with contact guard assist to be able to get up and go to the bathroom, within 3 to 5 sessions.

## 2024-05-28 NOTE — PHYSICAL THERAPY INITIAL EVALUATION ADULT - ADDITIONAL COMMENTS
Pt poor historian 2/2 history of dementia, history corroborated by stepdaughter Kamille at bedside (stepdtr [place] the city); pt lives in a private home with HHA 7 days 8am-4pm. Pt was not using AD PTA for mobility. Pt was able to get up and walk to the bathroom in general with supervision. Pt did not use home O2. Per EMR, pt s/p unwitnessed fall overnight, now with rib fractures 6,7.

## 2024-05-29 NOTE — PHARMACOTHERAPY INTERVENTION NOTE - COMMENTS
Modified penicillin allergy history to state that patient tolerated ceftriaxone during this admission.    Santosh Perez, PharmD, [facility]  Clinical Pharmacy Specialist, Infectious Diseases  Tele-Antimicrobial Stewardship Program (Tele-ASP)  Tele-ASP Phone: [phone]

## 2024-05-29 NOTE — PROGRESS NOTE ADULT - PROBLEM SELECTOR PLAN 5
No reported h/o CHF  - pro-BNP 5300 on admission  - CT Chest: +small dependent pleural effusions bilaterally  - Takes lasix 20mg at home for leg swelling  - f/u TTE

## 2024-05-29 NOTE — PROGRESS NOTE ADULT - PROBLEM SELECTOR PLAN 4
hgh 10.4 -> 8.4  dilutional vs acute loss in hematomas?  type and screen ordered for possible need for transfusion  no hx of cad or heart disease, transfuse<7  repeat h/h in pm  f/u folate, b12, iron studies

## 2024-05-29 NOTE — PROGRESS NOTE ADULT - ASSESSMENT
89 y/o F with PMHx ILD (not on home O2), pulmonary fibrosis, lung granulomatosis disease (s/p granuloma resection 1995), intestinal obstruction, mild dysplastic syndrome, pernicious anemia, dementia, OA, osteoporosis presenting to the ED s/p unwitnessed fall at home and productive cough. Admitted for PNA, UTI.

## 2024-05-29 NOTE — PROGRESS NOTE ADULT - SUBJECTIVE AND OBJECTIVE BOX
Patient is a 88y old  Female who presents with a chief complaint of PNA (28 May 2024 13:23)      Subjective:  INTERVAL HPI/OVERNIGHT EVENTS: Patient seen and examined at bedside. Overnight, patient had increased WOB requiring duoneb treatment x1 with relief. currently patient on NC with minor increase in WOB, appears patient is unable to take full breaths without feeling pain. Patient states that every time she moves, coughs, or takes a moderate sized breath she is in pain and must stop abruptly. Patient saturating >90% on RA with RR at 28, will place on NRB and give tramadol for pain.      MEDICATIONS  (STANDING):  albuterol/ipratropium for Nebulization 3 milliLiter(s) Nebulizer every 6 hours  cefTRIAXone   IVPB 1000 milliGRAM(s) IV Intermittent every 24 hours  cholecalciferol 1000 Unit(s) Oral daily  cyanocobalamin 1000 MICROGram(s) Oral daily  donepezil 10 milliGRAM(s) Oral at bedtime  furosemide    Tablet 20 milliGRAM(s) Oral daily  guaiFENesin Oral Liquid (Sugar-Free) 200 milliGRAM(s) Oral every 6 hours  lidocaine   4% Patch 1 Patch Transdermal every 24 hours  memantine 10 milliGRAM(s) Oral two times a day  mirtazapine 15 milliGRAM(s) Oral at bedtime    MEDICATIONS  (PRN):  acetaminophen     Tablet .. 650 milliGRAM(s) Oral every 6 hours PRN Temp greater or equal to 38C (100.4F), Mild Pain (1 - 3)  aluminum hydroxide/magnesium hydroxide/simethicone Suspension 30 milliLiter(s) Oral every 4 hours PRN Dyspepsia  melatonin 3 milliGRAM(s) Oral at bedtime PRN Insomnia  ondansetron Injectable 4 milliGRAM(s) IV Push every 8 hours PRN Nausea and/or Vomiting  traMADol 25 milliGRAM(s) Oral every 6 hours PRN Moderate Pain (4 - 6)  traMADol 50 milliGRAM(s) Oral every 6 hours PRN Severe Pain (7 - 10)      Allergies    sulfa drugs (Short breath)  adhesives (Hives)  PC Pen VK (Other (Mild to Mod))    Intolerances        REVIEW OF SYSTEMS:  CONSTITUTIONAL: No fever or chills  HEENT:  No headache, no sore throat  RESPIRATORY: No cough, wheezing, or + SOB  CARDIOVASCULAR: + rib pain  GASTROINTESTINAL: No abd pain, nausea, vomiting, or diarrhea  GENITOURINARY: No dysuria, frequency, or hematuria  NEUROLOGICAL: no focal weakness or dizziness  MUSCULOSKELETAL: no myalgias     Objective:  Vital Signs Last 24 Hrs  T(C): 36.3 (29 May 2024 05:38), Max: 36.4 (28 May 2024 11:30)  T(F): 97.4 (29 May 2024 05:38), Max: 97.5 (28 May 2024 11:30)  HR: 88 (29 May 2024 08:10) (77 - 99)  BP: 102/63 (29 May 2024 05:38) (102/63 - 121/53)  BP(mean): --  RR: 20 (29 May 2024 05:38) (18 - 20)  SpO2: 95% (29 May 2024 08:10) (92% - 95%)    Parameters below as of 29 May 2024 08:10  Patient On (Oxygen Delivery Method): mask, nonrebreather        GENERAL: minor distress, difficulty breathing 2/2 pain  HEAD:  Atraumatic  EYES: conjunctiva and sclera clear  ENT: Moist mucous membranes  NECK: No JVD  CHEST/LUNG: dimiinished lung sounds, patient unable to take full breaths, rales heard b/l, pain on light palpation in right side  HEART: Regular rate and rhythm  ABDOMEN: Bowel sounds present; Soft, Nontender  EXTREMITIES:  2+ Peripheral Pulses, brisk capillary refill.  NERVOUS SYSTEM:  Alert & Oriented X1  MSK: FROM all 4 extremities, full and equal strength  SKIN: No rashes or lesions    LABS:    CBC Full  -  ( 28 May 2024 07:36 )  WBC Count : 12.22 K/uL  Hemoglobin : 8.4 g/dL  Hematocrit : 26.0 %  Platelet Count - Automated : 144 K/uL  Mean Cell Volume : 111.1 fl  Mean Cell Hemoglobin : 35.9 pg  Mean Cell Hemoglobin Concentration : 32.3 gm/dL  Auto Neutrophil # : 10.95 K/uL  Auto Lymphocyte # : 0.34 K/uL  Auto Monocyte # : 0.48 K/uL  Auto Eosinophil # : 0.04 K/uL  Auto Basophil # : 0.05 K/uL  Auto Neutrophil % : 89.7 %  Auto Lymphocyte % : 2.8 %  Auto Monocyte % : 3.9 %  Auto Eosinophil % : 0.3 %  Auto Basophil % : 0.4 %      Ca    8.6        28 May 2024 07:36      PT/INR - ( 27 May 2024 09:30 )   PT: 13.1 sec;   INR: 1.12 ratio         PTT - ( 27 May 2024 09:30 )  PTT:29.5 sec  Urinalysis Basic - ( 28 May 2024 07:36 )    Color: x / Appearance: x / SG: x / pH: x  Gluc: 122 mg/dL / Ketone: x  / Bili: x / Urobili: x   Blood: x / Protein: x / Nitrite: x   Leuk Esterase: x / RBC: x / WBC x   Sq Epi: x / Non Sq Epi: x / Bacteria: x      CAPILLARY BLOOD GLUCOSE            Culture - Blood (collected 05-27-24 @ 09:35)  Source: .Blood Blood-Peripheral  Preliminary Report (05-28-24 @ 16:01):    No growth at 24 hours    Culture - Blood (collected 05-27-24 @ 09:30)  Source: .Blood Blood-Peripheral  Preliminary Report (05-28-24 @ 16:01):    No growth at 24 hours    Culture - Urine (collected 05-27-24 @ 09:30)  Source: Clean Catch Clean Catch (Midstream)  Final Report (05-28-24 @ 15:03):    >=3 organisms. Probable collection contamination.        RADIOLOGY & ADDITIONAL TESTS:    Personally reviewed.     Consultant(s) Notes Reviewed:  [x] YES  [ ] NO

## 2024-05-29 NOTE — PROGRESS NOTE ADULT - PROBLEM SELECTOR PLAN 2
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

## 2024-05-29 NOTE — PROGRESS NOTE ADULT - ATTENDING COMMENTS
87 y/o F with PMHx ILD (not on home O2), pulmonary fibrosis, lung granulomatosis disease (s/p granuloma resection 1995), intestinal obstruction, mild dysplastic syndrome, pernicious anemia, dementia, OA, osteoporosis presenting to the ED s/p unwitnessed fall at home and productive cough. Admitted for PNA, UTI.    Patient seen and examined at bedside. Overnight events noted, patient placed on NRB for respiratory distress and tramadol for pain.  Continue IV antibiotics for PNA. Will attempt to wean O2 as tolerated. Patient noted to be anemic, check iron panel, folate, B12, will transfuse for Hgb <7, check FOBT.

## 2024-05-29 NOTE — PROGRESS NOTE ADULT - PROBLEM SELECTOR PLAN 9
SCDs given hematomas -> will monitor h/h if stable, will considered chemical dvt SCDs given hematomas -> will monitor h/h if stable, will considered chemical dvt    PCP: Dr. Cabral  [phone]  f/u in AM to get further health history

## 2024-05-29 NOTE — PROGRESS NOTE ADULT - SUBJECTIVE AND OBJECTIVE BOX
[facility]  INFECTIOUS DISEASES   [address] [place] [address]  Tel: [phone]     Fax: [phone]  ========================================================  MD Massimo Barakat Kaushal, MD Cho, Michelle, MD Sunjit, Jaspal, MD  ========================================================    MRN-[id number]  VALORIE BONI     Follow up: Pneumonia     Awake and alert, mild respiratory distress. On o2 with NRB with good sat around 99%.   No fever.     PAST MEDICAL & SURGICAL HISTORY:  Fibroids  1978  Lymphadenopathy, inguinal  right 1988, removed surgically  Granulomatous lung disease  1995 at [facility] had granuloma removed  Pulmonary fibrosis  8/13  Osteoarthritis  Osteoporosis  Pernicious anemia  2000  Lichen of skin  vaginal area, 2012  Intestinal obstruction  1999  MDS (myelodysplastic syndrome)  2013, found incidentally  BOOP (bronchiolitis obliterans with organizing pneumonia)  1991  ILD (interstitial lung disease)  Mild dementia  Sprain of right rotator cuff capsule, initial encounter  S/P tubal ligation  1976  S/P hysterectomy  Left ovary left in place, 1978  S/P hip replacement  right 2004  S/P hip replacement  left 2013  Elective surgery  (VAT and biopsy, 2014)    Social Hx: No current smoking, EtOH or drugs     FAMILY HISTORY:  Noncontributory     Allergies  sulfa drugs (Short breath)  adhesives (Hives)  PC Pen VK (Other (Mild to Mod))    MEDICATIONS  (STANDING):  acetaminophen   IVPB .. 750 milliGRAM(s) IV Intermittent every 6 hours  cefTRIAXone   IVPB 1000 milliGRAM(s) IV Intermittent every 24 hours  cholecalciferol 1000 Unit(s) Oral daily  cyanocobalamin 1000 MICROGram(s) Oral daily  donepezil 10 milliGRAM(s) Oral at bedtime  guaiFENesin Oral Liquid (Sugar-Free) 200 milliGRAM(s) Oral every 6 hours  lidocaine   4% Patch 1 Patch Transdermal every 24 hours  memantine 10 milliGRAM(s) Oral two times a day  mirtazapine 15 milliGRAM(s) Oral at bedtime    MEDICATIONS  (PRN):  aluminum hydroxide/magnesium hydroxide/simethicone Suspension 30 milliLiter(s) Oral every 4 hours PRN Dyspepsia  melatonin 3 milliGRAM(s) Oral at bedtime PRN Insomnia  ondansetron Injectable 4 milliGRAM(s) IV Push every 8 hours PRN Nausea and/or Vomiting  traMADol 50 milliGRAM(s) Oral every 6 hours PRN Severe Pain (7 - 10)  traMADol 25 milliGRAM(s) Oral every 6 hours PRN Moderate Pain (4 - 6)     REVIEW OF SYSTEMS:  CONSTITUTIONAL:  No Fever or chills  HEENT:  No diplopia or blurred vision.  No sore throat or runny nose.  CARDIOVASCULAR:  No chest pain   RESPIRATORY:  No cough, shortness of breath, PND or orthopnea.  GASTROINTESTINAL:  No nausea, vomiting or diarrhea.  GENITOURINARY:  No dysuria, frequency or urgency. No Blood in urine  MUSCULOSKELETAL:  no joint aches, no muscle pain  SKIN:  No change in skin, hair or nails.    Physical Exam:  Vital Signs Last 24 Hrs  T(C): 36.4 (29 May 2024 11:40), Max: 36.4 (29 May 2024 11:40)  T(F): 97.6 (29 May 2024 11:40), Max: 97.6 (29 May 2024 11:40)  HR: 84 (29 May 2024 11:40) (84 - 99)  BP: 95/64 (29 May 2024 11:40) (95/64 - 120/76)  BP(mean): --  RR: 28 (29 May 2024 11:40) (19 - 28)  SpO2: 95% (29 May 2024 11:40) (92% - 95%)  Parameters below as of 29 May 2024 11:40  Patient On (Oxygen Delivery Method): mask, nonrebreather  O2 Flow (L/min): 15  GEN: NAD  HEENT: normocephalic and atraumatic. EOMI. PERRL.    NECK: Supple.  No lymphadenopathy   LUNGS: Crackles bilaterally throughout   HEART: Regular rate and rhythm  ABDOMEN: Soft, nontender, and nondistended.    : No CVA tenderness  EXTREMITIES: Without edema.  NEUROLOGIC: grossly intact.  PSYCHIATRIC: Awake and alert but not oriented   SKIN: No rash       Labs:                        7.9    13.37 )-----------( 150      ( 29 May 2024 08:30 )             24.8     05-29    139  |  106  |  28<H>  ----------------------------<  130<H>  4.1   |  29  |  0.90    Ca    8.6      29 May 2024 08:30    TPro  5.6<L>  /  Alb  2.0<L>  /  TBili  0.4  /  DBili  x   /  AST  15  /  ALT  19  /  AlkPhos  48  05-29    Culture - Blood (collected 05-27-24 @ 09:35)  Source: .Blood Blood-Peripheral  Preliminary Report (05-28-24 @ 16:01):    No growth at 24 hours    Culture - Blood (collected 05-27-24 @ 09:30)  Source: .Blood Blood-Peripheral  Preliminary Report (05-28-24 @ 16:01):    No growth at 24 hours    Culture - Urine (collected 05-27-24 @ 09:30)  Source: Clean Catch Clean Catch (Midstream)  Final Report (05-28-24 @ 15:03):    >=3 organisms. Probable collection contamination.    WBC Count: 13.37 K/uL (05-29-24 @ 08:30)  WBC Count: 12.22 K/uL (05-28-24 @ 07:36)  WBC Count: 18.45 K/uL (05-27-24 @ 09:30)    Creatinine: 0.90 mg/dL (05-29-24 @ 08:30)  Creatinine: 1.00 mg/dL (05-28-24 @ 07:36)  Creatinine: 1.10 mg/dL (05-27-24 @ 09:30)    Procalcitonin: 0.32 ng/mL (05-28-24 @ 07:36)     SARS-CoV-2: NotDetec (05-27-24 @ 09:30)    All imaging and other data have been reviewed.  < from: CT Chest No Cont (05.27.24 @ 10:55) >  IMPRESSION:  1.  RIGHT lateral 7th rib fracture (309/141). Minimal anterior RIGHT   6th,7th rib deformities/possible additional nondisplaced fractures   (309-174, 191).  2.  Age indeterminant LEFT pubic fractures favors chronic etiology (area   degraded by motion unsharpness and orthopedic beam hardening artifact).  3.  Bilateral lung patchy airspace and groundglass opacities. Chronic   underlying lung disease cannot be distinguished from multifocal   bronchopneumonia or combination of both. Small dependent pleural effusions  4.  Prominent/normal size spleen- wandering spleen,  enlarged from prior   exams. Clinical correlation with regard to LUQ pain recommended    Assessment and Plan:   89 y/o woman with PMH of ILD/pulmonary fibrosis, lung granulomatosis disease (s/p granuloma resection 1995), myelodysplastic syndrome, pernicious anemia and dementia was admitted   after an un unwitnessed fall at home and productive cough. Daughter is at the bedside. No fever. Has some sough and SOB on o2 NC now.   Leukocytosis 18.45   CT chest with GGO in both sides with underlying fibrosis and possibly multifocal bronchopneumonia, also she had multiple rib fractures. UA with 15 WBC otherwise negative.     # Pneumonia?   # ILD  # UTI?  # Fall and rib fractures     - Blood cultures NGTD   - UC with >3 different colonies possibly contamination  - Urinary strep Ag is positive   - Legionella U ag is negative   - Continue ceftriaxone 1gm daily  - Will monitor WBC 18-->13  - Nebulizer as per pulmonary, has some wheezing    Will follow.    Sara Moya MD  Division of Infectious Diseases   Please call ID service at [phone] with any question.    50 minutes spent on total encounter assessing patient, examination, chart review, counseling and coordinating care by the attending physician/nurse/care manager.

## 2024-05-29 NOTE — CASE MANAGEMENT PROGRESS NOTE - NSCMPROGRESSNOTE_GEN_ALL_CORE
Discussed patient on rounds this morning. Patient has been placed on a nonrebreather. Patient is on IV Rocephin. PT has recommended JORGE. SW will follow for transition planning when medically cleared. CM remains available.

## 2024-05-30 NOTE — PROGRESS NOTE ADULT - PROBLEM SELECTOR PLAN 1
Productive cough, weakness, decreased PO intake x 1week following large social gathering. Likely CAP Bacterial vs. Viral  - Tachypnea, Leukocytosis, elevated lactate, hypotensive on admission, +imaging meets sepsis criteria  - CT Chest:  Bilateral lung patchy airspace and groundglass opacities. Chronic underlying lung disease cannot be distinguished from multifocal bronchopneumonia or combination of both. Small dependent pleural effusions  - rvp negative  - strep ag +, leg -, repeat strep now negative  - c/w ceftriaxone  - Robitussin  - ID (Dr. Dozier) consult

## 2024-05-30 NOTE — PROGRESS NOTE ADULT - PROBLEM SELECTOR PLAN 5
No reported h/o CHF  - pro-BNP 5300 on admission  - CT Chest: +small dependent pleural effusions bilaterally  - Takes lasix 20mg at home for leg swelling  - f/u TTE -> estimated at 55 to 60 %, Estimated pulmonary artery systolic pressure is 45 mmHg, consistent with mild pulmonary hypertension.

## 2024-05-30 NOTE — PROGRESS NOTE ADULT - PROBLEM SELECTOR PLAN 9
Occupational Therapy Treatment Note     02/26/20 2945   Restrictions/Precautions   Weight Bearing Precautions Per Order No   Other Precautions Cognitive; Fall Risk   Lifestyle   Autonomy I ADLS, IADLS, transfers and functional mobility PTA   Reciprocal Relationships Pt lives w/ parents; they are home as needed   Service to Others Pt does not work   Intrinsic Gratification Pt enjoy sports (C[employer]); and enjoys TV (political shows)   Pain Assessment   Pain Assessment 0-10   ADL   Where Assessed Sitting at [employer] Assistance 5  Supervision/Setup   Grooming Deficit Increased time to complete;Setup   Bed Mobility   Supine to Sit 5  Supervision   Additional items Increased time required   Sit to Supine 5  Supervision   Additional items Increased time required   Transfers   Sit to Stand 3  Moderate assistance   Additional items Assist x 1   Stand to Sit 4  Minimal assistance   Additional items Assist x 1   Functional Mobility   Functional Mobility 4  Minimal assistance   Additional items Rolling walker   Cognition   Overall Cognitive Status Impaired   Arousal/Participation Responsive   Attention Attends with cues to redirect   Orientation Level Oriented X4   Memory Unable to assess   Following Commands Follows one step commands with increased time or repetition   Activity Tolerance   Activity Tolerance Patient limited by fatigue   Assessment   Assessment Pt participated in occupational therapy with focus on activity tolerance,  bed mob, functional transfers/mob, standing tolerance and balance for pt engagement in UB self-care  Pt cleared by RN/ [place] for pt participation in occupational therapy  Pt received HOB raised/supine pt sitting upright and agreeable to therapy     Pt reported his  goal to be able to return to playing with his niece and nephews     Pt requires  Assist for functional transfers/mob with RW and chair set up at bathroom sink 2* pt decreased overall strength and limited activity tolerance  Pt able to tolerate grooming tasks/shaving seated at bathroom sink after OT set up and instruction  Pt will require in-pt rehab to continue to address pt noted deficits which currently impair pt ADL and functional mob        Plan   Treatment Interventions ADL retraining   Goal Expiration Date 03/09/20   OT Treatment Day 2   OT Frequency 3-5x/wk   Recommendation   OT Discharge Recommendation Short Term Rehab   Barthel Index   Feeding 10   Bathing 0   Grooming Score 5   Dressing Score 5   Bladder Score 10   Bowels Score 5   Toilet Use Score 5   Transfers (Bed/Chair) Score 5   Mobility (Level Surface) Score 0   Stairs Score 0   Barthel Index Score 45       Galindo Graven  [place]/L SCDs given hematomas -> will monitor h/h if stable, will considered chemical dvt    PCP: Dr. Cabral  [phone]  attempted to reach PCP office, no response

## 2024-05-30 NOTE — PROGRESS NOTE ADULT - SUBJECTIVE AND OBJECTIVE BOX
Patient is a 88y old  Female who presents with a chief complaint of PNA (29 May 2024 13:39)      Subjective:  INTERVAL HPI/OVERNIGHT EVENTS: Patient seen and examined at bedside. No overnight events occurred. Patient has no complaints at this time. Denies fevers, chills, headache, lightheadedness, chest pain, dyspnea, abdominal pain, n/v/d/c. patient states that pain on standing tylenol has improved. patient able to breath more comfortably.     MEDICATIONS  (STANDING):  acetaminophen     Tablet .. 1000 milliGRAM(s) Oral every 8 hours  albuterol/ipratropium for Nebulization 3 milliLiter(s) Nebulizer every 6 hours  cefTRIAXone   IVPB 1000 milliGRAM(s) IV Intermittent every 24 hours  cholecalciferol 1000 Unit(s) Oral daily  cyanocobalamin 1000 MICROGram(s) Oral daily  donepezil 10 milliGRAM(s) Oral at bedtime  guaiFENesin Oral Liquid (Sugar-Free) 200 milliGRAM(s) Oral every 6 hours  iron sucrose IVPB 100 milliGRAM(s) IV Intermittent every 24 hours  lidocaine   4% Patch 1 Patch Transdermal every 24 hours  memantine 10 milliGRAM(s) Oral two times a day  mirtazapine 15 milliGRAM(s) Oral at bedtime  mupirocin 2% Ointment 1 Application(s) Both Nostrils two times a day    MEDICATIONS  (PRN):  aluminum hydroxide/magnesium hydroxide/simethicone Suspension 30 milliLiter(s) Oral every 4 hours PRN Dyspepsia  melatonin 3 milliGRAM(s) Oral at bedtime PRN Insomnia  ondansetron Injectable 4 milliGRAM(s) IV Push every 8 hours PRN Nausea and/or Vomiting  traMADol 25 milliGRAM(s) Oral every 6 hours PRN Moderate Pain (4 - 6)  traMADol 50 milliGRAM(s) Oral every 6 hours PRN Severe Pain (7 - 10)      Allergies    sulfa drugs (Short breath)  adhesives (Hives)  PC Pen VK (Other (Mild to Mod))    Intolerances        REVIEW OF SYSTEMS:  CONSTITUTIONAL: No fever or chills  HEENT:  No headache, no sore throat  RESPIRATORY: + cough  CARDIOVASCULAR: No chest pain, palpitations  GASTROINTESTINAL: No abd pain, nausea, vomiting, or diarrhea  GENITOURINARY: No dysuria, frequency, or hematuria  NEUROLOGICAL: no focal weakness or dizziness  MUSCULOSKELETAL: no myalgias     Objective:  Vital Signs Last 24 Hrs  T(C): 36.6 (30 May 2024 13:38), Max: 36.7 (29 May 2024 20:21)  T(F): 97.8 (30 May 2024 13:38), Max: 98 (29 May 2024 20:21)  HR: 88 (30 May 2024 13:38) (72 - 96)  BP: 90/53 (30 May 2024 13:38) (90/53 - 96/60)  BP(mean): --  RR: 18 (30 May 2024 13:38) (18 - 19)  SpO2: 91% (30 May 2024 13:38) (91% - 100%)    Parameters below as of 30 May 2024 13:38  Patient On (Oxygen Delivery Method): nasal cannula        GENERAL: ill appearing patient  HEAD:  Atraumatic  EYES: conjunctiva and sclera clear  ENT: Moist mucous membranes  NECK: No JVD  CHEST/LUNG: diminished lung sounds, rales b/l in lower lobes.  HEART: Regular rate and rhythm; No murmurs, rubs, or gallops  ABDOMEN: Soft, Nontender, Nondistended.  EXTREMITIES:  2+ Peripheral Pulses  NERVOUS SYSTEM:  Alert & Oriented X3  MSK: FROM all 4 extremities      LABS:                        7.8    11.94 )-----------( 145      ( 30 May 2024 08:08 )             25.0     CBC Full  -  ( 30 May 2024 08:08 )  WBC Count : 11.94 K/uL  Hemoglobin : 7.8 g/dL  Hematocrit : 25.0 %  Platelet Count - Automated : 145 K/uL  Mean Cell Volume : 113.1 fl  Mean Cell Hemoglobin : 35.3 pg  Mean Cell Hemoglobin Concentration : 31.2 gm/dL  Auto Neutrophil # : 10.29 K/uL  Auto Lymphocyte # : 0.40 K/uL  Auto Monocyte # : 0.44 K/uL  Auto Eosinophil # : 0.12 K/uL  Auto Basophil # : 0.06 K/uL  Auto Neutrophil % : 86.1 %  Auto Lymphocyte % : 3.4 %  Auto Monocyte % : 3.7 %  Auto Eosinophil % : 1.0 %  Auto Basophil % : 0.5 %    30 May 2024 08:08    139    |  105    |  41     ----------------------------<  130    4.7     |  31     |  1.30     Ca    8.7        30 May 2024 08:08  Phos  3.9       30 May 2024 08:08  Mg     2.7       30 May 2024 08:08        Urinalysis Basic - ( 30 May 2024 08:08 )    Color: x / Appearance: x / SG: x / pH: x  Gluc: 130 mg/dL / Ketone: x  / Bili: x / Urobili: x   Blood: x / Protein: x / Nitrite: x   Leuk Esterase: x / RBC: x / WBC x   Sq Epi: x / Non Sq Epi: x / Bacteria: x      CAPILLARY BLOOD GLUCOSE            Culture - Blood (collected 05-27-24 @ 09:35)  Source: .Blood Blood-Peripheral  Preliminary Report (05-29-24 @ 16:02):    No growth at 48 Hours    Culture - Urine (collected 05-27-24 @ 09:30)  Source: Clean Catch Clean Catch (Midstream)  Final Report (05-28-24 @ 15:03):    >=3 organisms. Probable collection contamination.    Culture - Blood (collected 05-27-24 @ 09:30)  Source: .Blood Blood-Peripheral  Preliminary Report (05-29-24 @ 16:02):    No growth at 48 Hours        RADIOLOGY & ADDITIONAL TESTS:    Personally reviewed.     Consultant(s) Notes Reviewed:  [x] YES  [ ] NO

## 2024-05-30 NOTE — PROGRESS NOTE ADULT - ATTENDING COMMENTS
87 y/o F with PMHx ILD (not on home O2), pulmonary fibrosis, lung granulomatosis disease (s/p granuloma resection 1995), intestinal obstruction, mild dysplastic syndrome, pernicious anemia, dementia, OA, osteoporosis presenting to the ED s/p unwitnessed fall at home and productive cough. Admitted for PNA, UTI.    Patient seen and examined at bedside. States she is feeling better this AM. States she feels her breathing is more comfortable, but still with trouble taking deep breaths due to the rib fractures. Continue IV abx for PNA/UTI. Patient with worsening renal indices on AM labs, given 250cc bolus x1 and encouraged to increase PO water intake. Hgb stable, discussed case with patient's outpatient Hematologist- Dr. DEMETRIA Rodriguez- states patient with form of MDS, was on aranesp in the past and has received transfusions in the past as well. Recommend 1 unit PRBC transfusion prior to d/c, will plan for PRBC transfusion in AM. PT recommending [facility]. Plan of care discussed with daughter over phone, states she will provide choices to SW once she reviews the facilities. 87 y/o F with PMHx ILD (not on home O2), pulmonary fibrosis, lung granulomatosis disease (s/p granuloma resection 1995), intestinal obstruction, mild dysplastic syndrome, pernicious anemia, dementia, OA, osteoporosis presenting to the ED s/p unwitnessed fall at home and productive cough. Admitted for PNA, UTI.    Patient seen and examined at bedside. States she is feeling better this AM. States she feels her breathing is more comfortable, but still with trouble taking deep breaths due to the rib fractures. Continue IV abx for PNA/UTI. Patient with worsening renal indices on AM labs, given 250cc bolus x1 and encouraged to increase PO water intake. Lasix held for now, consider restarting once SELENA resolves. Hgb stable, discussed case with patient's outpatient Hematologist- Dr. DEMETRIA Rodriguez- states patient with form of MDS, was on aranesp in the past and has received transfusions in the past as well. Recommend 1 unit PRBC transfusion prior to d/c, will plan for PRBC transfusion in AM. PT recommending [facility]. Plan of care discussed with daughter over phone, states she will provide choices to SW once she reviews the facilities.

## 2024-05-30 NOTE — SOCIAL WORK PROGRESS NOTE - NSSWPROGRESSNOTE_GEN_ALL_CORE
Pt seen by physical therapy and recommended for subacute rehab. choices offered and list provided. Pts family will review list and make a decision. Sw will continue to remain available for safe dc planning.

## 2024-05-30 NOTE — PROGRESS NOTE ADULT - PROBLEM SELECTOR PLAN 6
CT A/P: Prominent/normal size spleen- wandering spleen,  enlarged from prior exams. Clinical correlation with regard to LUQ pain recommended  - Likely will req outpatient f/u  - Trend hgb and abdominal closely  - ordered EBV serology with weakness and enlarged spleen  - EBV positive for reactive disease

## 2024-05-30 NOTE — PROGRESS NOTE ADULT - PROBLEM SELECTOR PLAN 4
hgh 10.4 -> 8.4 -> 7.7 stable  dilutional vs acute loss in hematomas?  active type and screen 5/29  blood consent in chart  no hx of cad or heart disease, transfuse<7  b12 elevated, ferritin elevated, anemia of chronic disease

## 2024-05-30 NOTE — PROGRESS NOTE ADULT - SUBJECTIVE AND OBJECTIVE BOX
[facility]  INFECTIOUS DISEASES   [address] [place] [address]  Tel: [phone]     Fax: [phone]  ========================================================  MD Massimo Barakat Kaushal, MD Cho, Michelle, MD Sunjit, Jaspal, MD  ========================================================    MRN-[id number]  VALORIE BONI     Follow up: Pneumonia     Awake and alert, mild respiratory distress. On o2 with NC, oxygenation is improving, yesterday was on NRB.    No fever.     PAST MEDICAL & SURGICAL HISTORY:  Fibroids  1978  Lymphadenopathy, inguinal  right 1988, removed surgically  Granulomatous lung disease  1995 at [facility] had granuloma removed  Pulmonary fibrosis  8/13  Osteoarthritis  Osteoporosis  Pernicious anemia  2000  Lichen of skin  vaginal area, 2012  Intestinal obstruction  1999  MDS (myelodysplastic syndrome)  2013, found incidentally  BOOP (bronchiolitis obliterans with organizing pneumonia)  1991  ILD (interstitial lung disease)  Mild dementia  Sprain of right rotator cuff capsule, initial encounter  S/P tubal ligation  1976  S/P hysterectomy  Left ovary left in place, 1978  S/P hip replacement  right 2004  S/P hip replacement  left 2013  Elective surgery  (VAT and biopsy, 2014)    Social Hx: No current smoking, EtOH or drugs     FAMILY HISTORY:  Noncontributory     Allergies  sulfa drugs (Short breath)  adhesives (Hives)  PC Pen VK (Other (Mild to Mod))    MEDICATIONS  (STANDING):  acetaminophen   IVPB .. 750 milliGRAM(s) IV Intermittent every 6 hours  cefTRIAXone   IVPB 1000 milliGRAM(s) IV Intermittent every 24 hours  cholecalciferol 1000 Unit(s) Oral daily  cyanocobalamin 1000 MICROGram(s) Oral daily  donepezil 10 milliGRAM(s) Oral at bedtime  guaiFENesin Oral Liquid (Sugar-Free) 200 milliGRAM(s) Oral every 6 hours  lidocaine   4% Patch 1 Patch Transdermal every 24 hours  memantine 10 milliGRAM(s) Oral two times a day  mirtazapine 15 milliGRAM(s) Oral at bedtime    MEDICATIONS  (PRN):  aluminum hydroxide/magnesium hydroxide/simethicone Suspension 30 milliLiter(s) Oral every 4 hours PRN Dyspepsia  melatonin 3 milliGRAM(s) Oral at bedtime PRN Insomnia  ondansetron Injectable 4 milliGRAM(s) IV Push every 8 hours PRN Nausea and/or Vomiting  traMADol 50 milliGRAM(s) Oral every 6 hours PRN Severe Pain (7 - 10)  traMADol 25 milliGRAM(s) Oral every 6 hours PRN Moderate Pain (4 - 6)     REVIEW OF SYSTEMS:  CONSTITUTIONAL:  No Fever or chills  HEENT:  No diplopia or blurred vision.  No sore throat or runny nose.  CARDIOVASCULAR:  No chest pain   RESPIRATORY:  No cough, shortness of breath, PND or orthopnea.  GASTROINTESTINAL:  No nausea, vomiting or diarrhea.  GENITOURINARY:  No dysuria, frequency or urgency. No Blood in urine  MUSCULOSKELETAL:  no joint aches, no muscle pain  SKIN:  No change in skin, hair or nails.    Physical Exam:  Vital Signs Last 24 Hrs  T(C): 36.6 (30 May 2024 13:38), Max: 36.7 (29 May 2024 20:21)  T(F): 97.8 (30 May 2024 13:38), Max: 98 (29 May 2024 20:21)  HR: 85 (30 May 2024 14:35) (72 - 96)  BP: 111/62 (30 May 2024 14:35) (90/53 - 111/62)  BP(mean): --  RR: 18 (30 May 2024 14:35) (18 - 19)  SpO2: 90% (30 May 2024 14:35) (90% - 100%)  Parameters below as of 30 May 2024 14:35  Patient On (Oxygen Delivery Method): nasal cannula  O2 Flow (L/min): 5  GEN: NAD  HEENT: normocephalic and atraumatic. EOMI. PERRL.    NECK: Supple.  No lymphadenopathy   LUNGS: Crackles bilaterally throughout   HEART: Regular rate and rhythm  ABDOMEN: Soft, nontender, and nondistended.    : No CVA tenderness  EXTREMITIES: Without edema.  NEUROLOGIC: grossly intact.  PSYCHIATRIC: Awake and alert but not oriented   SKIN: No rash     Labs:                        7.8    11.94 )-----------( 145      ( 30 May 2024 08:08 )             25.0     05-30    139  |  105  |  41<H>  ----------------------------<  130<H>  4.7   |  31  |  1.30    Ca    8.7      30 May 2024 08:08  Phos  3.9     05-30  Mg     2.7     05-30    TPro  5.6<L>  /  Alb  2.0<L>  /  TBili  0.4  /  DBili  x   /  AST  15  /  ALT  19  /  AlkPhos  48  05-29    Culture - Blood (collected 05-27-24 @ 09:35)  Source: .Blood Blood-Peripheral  Preliminary Report (05-29-24 @ 16:02):    No growth at 48 Hours    Culture - Urine (collected 05-27-24 @ 09:30)  Source: Clean Catch Clean Catch (Midstream)  Final Report (05-28-24 @ 15:03):    >=3 organisms. Probable collection contamination.    Culture - Blood (collected 05-27-24 @ 09:30)  Source: .Blood Blood-Peripheral  Preliminary Report (05-29-24 @ 16:02):    No growth at 48 Hours    WBC Count: 11.94 K/uL (05-30-24 @ 08:08)  WBC Count: 13.37 K/uL (05-29-24 @ 08:30)  WBC Count: 12.22 K/uL (05-28-24 @ 07:36)  WBC Count: 18.45 K/uL (05-27-24 @ 09:30)    Creatinine: 1.30 mg/dL (05-30-24 @ 08:08)  Creatinine: 0.90 mg/dL (05-29-24 @ 08:30)  Creatinine: 1.00 mg/dL (05-28-24 @ 07:36)  Creatinine: 1.10 mg/dL (05-27-24 @ 09:30)    Ferritin: 643 ng/mL (05-29-24 @ 08:30)    Procalcitonin: 0.32 ng/mL (05-28-24 @ 07:36)     SARS-CoV-2: NotDetec (05-27-24 @ 09:30)    All imaging and other data have been reviewed.  < from: CT Chest No Cont (05.27.24 @ 10:55) >  IMPRESSION:  1.  RIGHT lateral 7th rib fracture (309/141). Minimal anterior RIGHT   6th,7th rib deformities/possible additional nondisplaced fractures   (309-174, 191).  2.  Age indeterminant LEFT pubic fractures favors chronic etiology (area   degraded by motion unsharpness and orthopedic beam hardening artifact).  3.  Bilateral lung patchy airspace and groundglass opacities. Chronic   underlying lung disease cannot be distinguished from multifocal   bronchopneumonia or combination of both. Small dependent pleural effusions  4.  Prominent/normal size spleen- wandering spleen,  enlarged from prior   exams. Clinical correlation with regard to LUQ pain recommended    Assessment and Plan:   89 y/o woman with PMH of ILD/pulmonary fibrosis, lung granulomatosis disease (s/p granuloma resection 1995), myelodysplastic syndrome, pernicious anemia and dementia was admitted   after an un unwitnessed fall at home and productive cough. Daughter is at the bedside. No fever. Has some sough and SOB on o2 NC now.   Leukocytosis 18.45   CT chest with GGO in both sides with underlying fibrosis and possibly multifocal bronchopneumonia, also she had multiple rib fractures. UA with 15 WBC otherwise negative.     # Pneumonia?   # ILD  # UTI?  # Fall and rib fractures     - Blood cultures NGTD   - UC with >3 different colonies possibly contamination  - Urinary strep Ag is positive   - Legionella U ag is negative   - Continue ceftriaxone 1gm daily, today day 4/5  - Will monitor WBC 18.45-->11.94  - Nebulizer as per pulmonary, has some wheezing    Will follow PRN.    Sara Moya MD  Division of Infectious Diseases   Please call ID service at [phone] with any question.    50 minutes spent on total encounter assessing patient, examination, chart review, counseling and coordinating care by the attending physician/nurse/care manager.   Yes x 4

## 2024-05-31 NOTE — PROGRESS NOTE ADULT - SUBJECTIVE AND OBJECTIVE BOX
[facility]  INFECTIOUS DISEASES   [address] [place] [address]  Tel: [phone]     Fax: [phone]  ========================================================  MD Massimo Barakat Kaushal, MD Cho, Michelle, MD Sunjit, Jaspal, MD  ========================================================    [id number]  VALORIE PLATA     Follow up: Pneumonia     Awake and alert, On o2 with NC, oxygenation is improving.  No fever.     PAST MEDICAL & SURGICAL HISTORY:  Fibroids  1978  Lymphadenopathy, inguinal  right 1988, removed surgically  Granulomatous lung disease  1995 at [facility] had granuloma removed  Pulmonary fibrosis  8/13  Osteoarthritis  Osteoporosis  Pernicious anemia  2000  Lichen of skin  vaginal area, 2012  Intestinal obstruction  1999  MDS (myelodysplastic syndrome)  2013, found incidentally  BOOP (bronchiolitis obliterans with organizing pneumonia)  1991  ILD (interstitial lung disease)  Mild dementia  Sprain of right rotator cuff capsule, initial encounter  S/P tubal ligation  1976  S/P hysterectomy  Left ovary left in place, 1978  S/P hip replacement  right 2004  S/P hip replacement  left 2013  Elective surgery  (VAT and biopsy, 2014)    Social Hx: No current smoking, EtOH or drugs     FAMILY HISTORY:  Noncontributory     Allergies  sulfa drugs (Short breath)  adhesives (Hives)  PC Pen VK (Other (Mild to Mod))    MEDICATIONS  (STANDING):  acetaminophen   IVPB .. 750 milliGRAM(s) IV Intermittent every 6 hours  cefTRIAXone   IVPB 1000 milliGRAM(s) IV Intermittent every 24 hours  cholecalciferol 1000 Unit(s) Oral daily  cyanocobalamin 1000 MICROGram(s) Oral daily  donepezil 10 milliGRAM(s) Oral at bedtime  guaiFENesin Oral Liquid (Sugar-Free) 200 milliGRAM(s) Oral every 6 hours  lidocaine   4% Patch 1 Patch Transdermal every 24 hours  memantine 10 milliGRAM(s) Oral two times a day  mirtazapine 15 milliGRAM(s) Oral at bedtime    MEDICATIONS  (PRN):  aluminum hydroxide/magnesium hydroxide/simethicone Suspension 30 milliLiter(s) Oral every 4 hours PRN Dyspepsia  melatonin 3 milliGRAM(s) Oral at bedtime PRN Insomnia  ondansetron Injectable 4 milliGRAM(s) IV Push every 8 hours PRN Nausea and/or Vomiting  traMADol 50 milliGRAM(s) Oral every 6 hours PRN Severe Pain (7 - 10)  traMADol 25 milliGRAM(s) Oral every 6 hours PRN Moderate Pain (4 - 6)     REVIEW OF SYSTEMS:  CONSTITUTIONAL:  No Fever or chills  HEENT:  No diplopia or blurred vision.  No sore throat or runny nose.  CARDIOVASCULAR:  No chest pain   RESPIRATORY:  No cough, shortness of breath, PND or orthopnea.  GASTROINTESTINAL:  No nausea, vomiting or diarrhea.  GENITOURINARY:  No dysuria, frequency or urgency. No Blood in urine  MUSCULOSKELETAL:  no joint aches, no muscle pain  SKIN:  No change in skin, hair or nails.    Physical Exam:  Vital Signs Last 24 Hrs  T(C): 36.6 (31 May 2024 11:30), Max: 36.6 (30 May 2024 13:38)  T(F): 97.9 (31 May 2024 11:30), Max: 97.9 (31 May 2024 11:30)  HR: 79 (31 May 2024 11:30) (74 - 88)  BP: 94/60 (31 May 2024 11:30) (90/53 - 111/62)  BP(mean): --  RR: 28 (31 May 2024 11:30) (17 - 28)  SpO2: 91% (31 May 2024 11:30) (90% - 98%)  Parameters below as of 31 May 2024 11:30  Patient On (Oxygen Delivery Method): nasal cannula  O2 Flow (L/min): 5  GEN: NAD  HEENT: normocephalic and atraumatic. EOMI. PERRL.    NECK: Supple.  No lymphadenopathy   LUNGS: Crackles bilaterally throughout   HEART: Regular rate and rhythm  ABDOMEN: Soft, nontender, and nondistended.    : No CVA tenderness  EXTREMITIES: Without edema.  NEUROLOGIC: grossly intact.  PSYCHIATRIC: Awake and alert but not oriented   SKIN: No rash     Labs:                        7.8    12.46 )-----------( 164      ( 31 May 2024 06:34 )             24.8     05-31    139  |  106  |  42<H>  ----------------------------<  114<H>  4.4   |  29  |  1.10    Ca    8.4<L>      31 May 2024 06:34  Phos  3.5     05-31  Mg     2.5     05-31    TPro  5.1<L>  /  Alb  1.8<L>  /  TBili  0.4  /  DBili  x   /  AST  17  /  ALT  15  /  AlkPhos  47  05-31    Culture - Blood (collected 05-27-24 @ 09:35)  Source: .Blood Blood-Peripheral  Preliminary Report (05-30-24 @ 16:01):    No growth at 72 Hours    Culture - Urine (collected 05-27-24 @ 09:30)  Source: Clean Catch Clean Catch (Midstream)  Final Report (05-28-24 @ 15:03):    >=3 organisms. Probable collection contamination.    Culture - Blood (collected 05-27-24 @ 09:30)  Source: .Blood Blood-Peripheral  Preliminary Report (05-30-24 @ 16:01):    No growth at 72 Hours    WBC Count: 12.46 K/uL (05-31-24 @ 06:34)  WBC Count: 11.94 K/uL (05-30-24 @ 08:08)  WBC Count: 13.37 K/uL (05-29-24 @ 08:30)  WBC Count: 12.22 K/uL (05-28-24 @ 07:36)  WBC Count: 18.45 K/uL (05-27-24 @ 09:30)    Creatinine: 1.10 mg/dL (05-31-24 @ 06:34)  Creatinine: 1.30 mg/dL (05-30-24 @ 08:08)  Creatinine: 0.90 mg/dL (05-29-24 @ 08:30)  Creatinine: 1.00 mg/dL (05-28-24 @ 07:36)  Creatinine: 1.10 mg/dL (05-27-24 @ 09:30)    Ferritin: 643 ng/mL (05-29-24 @ 08:30)    Procalcitonin: 0.32 ng/mL (05-28-24 @ 07:36)     SARS-CoV-2: NotDetec (05-27-24 @ 09:30)    All imaging and other data have been reviewed.  < from: CT Chest No Cont (05.27.24 @ 10:55) >  IMPRESSION:  1.  RIGHT lateral 7th rib fracture (309/141). Minimal anterior RIGHT   6th,7th rib deformities/possible additional nondisplaced fractures   (309-174, 191).  2.  Age indeterminant LEFT pubic fractures favors chronic etiology (area   degraded by motion unsharpness and orthopedic beam hardening artifact).  3.  Bilateral lung patchy airspace and groundglass opacities. Chronic   underlying lung disease cannot be distinguished from multifocal   bronchopneumonia or combination of both. Small dependent pleural effusions  4.  Prominent/normal size spleen- wandering spleen,  enlarged from prior   exams. Clinical correlation with regard to LUQ pain recommended    Assessment and Plan:   87 y/o woman with PMH of ILD/pulmonary fibrosis, lung granulomatosis disease (s/p granuloma resection 1995), myelodysplastic syndrome, pernicious anemia and dementia was admitted   after an un unwitnessed fall at home and productive cough. Daughter is at the bedside. No fever. Has some sough and SOB on o2 NC now.   Leukocytosis 18.45   CT chest with GGO in both sides with underlying fibrosis and possibly multifocal bronchopneumonia, also she had multiple rib fractures. UA with 15 WBC otherwise negative.     # Pneumonia?   # ILD  # UTI?  # Fall and rib fractures     - Blood cultures NGTD   - UC with >3 different colonies possibly contamination  - Urinary strep Ag is positive   - Legionella U ag is negative   - Continue ceftriaxone 1gm daily, today is last day, day 5  - WBC 18.45-->12.46 and Tmax normal   - Nebulizer as per pulmonary    Will follow PRN. Please call with any question.   Dr. Jarrett is covering this weekend.     Sara Moya MD  Division of Infectious Diseases   Please call ID service at [phone] with any question.    50 minutes spent on total encounter assessing patient, examination, chart review, counseling and coordinating care by the attending physician/nurse/care manager.

## 2024-05-31 NOTE — CHART NOTE - NSCHARTNOTEFT_GEN_A_CORE
Called by RN for Pt c/o shortness of breath. Pt with desaturation on NC placed on 50% ventimask now saturating 90%.    Pt seen and examined at bedside. Pt tachypneic and tachycardic with increased work of breathing. Pt with ventimask on however pt taking it off intermittently because she feels phlegm in her throat. Pt coughing with small amount of phlegm on tissue. Pt placed on continuous pulse oximetry currently saturating 90-92%. Pt denies chest pain, dizziness, nausea, vomiting.       T(C): 36.7 (05-31-24 @ 20:41), Max: 37.6 (05-31-24 @ 14:20)  HR: 130 (05-31-24 @ 20:41) (74 - 130)  BP: 165/86 (05-31-24 @ 20:41) (94/60 - 165/86)  RR: 18 (05-31-24 @ 20:41) (16 - 28)  SpO2: 91% (05-31-24 @ 20:41) (90% - 98%)  Wt(kg): --    Physical :  Gen- tachypneic, increased work of breathing, venti mask in place  Cardio - tachycardic with regular rhythm  Lung - crackles present b/l lower lobes  Abdomen- +BS, NT/ND  Ext- mild edema present equal bilaterally, no erythema, mild TTP on LLE  Neuro- answers questions and follows commands appropriately    LABS:                        7.8    12.46 )-----------( 164      ( 31 May 2024 06:34 )             24.8     05-31    139  |  106  |  42<H>  ----------------------------<  114<H>  4.4   |  29  |  1.10    Ca    8.4<L>      31 May 2024 06:34  Phos  3.5     05-31  Mg     2.5     05-31    TPro  5.1<L>  /  Alb  1.8<L>  /  TBili  0.4  /  DBili  x   /  AST  17  /  ALT  15  /  AlkPhos  47  05-31      Urinalysis Basic - ( 31 May 2024 06:34 )    Color: x / Appearance: x / SG: x / pH: x  Gluc: 114 mg/dL / Ketone: x  / Bili: x / Urobili: x   Blood: x / Protein: x / Nitrite: x   Leuk Esterase: x / RBC: x / WBC x   Sq Epi: x / Non Sq Epi: x / Bacteria: x      ABG - ( 31 May 2024 23:53 )  pH, Arterial: 7.44  pH, Blood: x     /  pCO2: 42    /  pO2: 116   / HCO3: 28    / Base Excess: 4.3   /  SaO2: 99.2        Assessment/Plan  88yFemale admitted for PNA, called for desaturation and tachypnea.  - Pt tachycardic and tachypneic with increased work of breathing, placed on venti mask with improvement in saturation to 92%  - cont pulse ox ordered  - CXR from earlier in the day reviewed, pt remains with pulmonary congestion  - gave 40mg IV lasix x1 ; home oral dose had been held for SELENA  - attempted to suction patient at bedside, got minimal sputum out and pt reported feeling a little better  - s/p suctioning and lasix patient reports feeling somewhat improved, remains tachypneic and tachycardic but both improved to prior  - now saturating 95% on 50% venti mask  - will get ABG  - pt not on DVT ppx due to hematomas; given tachycardia and tachypnea will order CTA to rule out PE  - RN to please call with any changes Called by RN for Pt c/o shortness of breath. Pt with desaturation on NC placed on 50% ventimask now saturating 90%.    Pt seen and examined at bedside. Pt tachypneic and tachycardic with increased work of breathing. Pt with ventimask on however pt taking it off intermittently because she feels phlegm in her throat. Pt coughing with small amount of phlegm on tissue. Pt placed on continuous pulse oximetry currently saturating 90-92%. Pt denies chest pain, dizziness, nausea, vomiting.       T(C): 36.7 (05-31-24 @ 20:41), Max: 37.6 (05-31-24 @ 14:20)  HR: 130 (05-31-24 @ 20:41) (74 - 130)  BP: 165/86 (05-31-24 @ 20:41) (94/60 - 165/86)  RR: 18 (05-31-24 @ 20:41) (16 - 28)  SpO2: 91% (05-31-24 @ 20:41) (90% - 98%)  Wt(kg): --    Physical :  Gen- tachypneic, increased work of breathing, venti mask in place  Cardio - tachycardic with regular rhythm  Lung - crackles present b/l lower lobes  Abdomen- +BS, NT/ND  Ext- mild edema present equal bilaterally, no erythema, mild TTP on LLE  Neuro- answers questions and follows commands appropriately    LABS:                        7.8    12.46 )-----------( 164      ( 31 May 2024 06:34 )             24.8     05-31    139  |  106  |  42<H>  ----------------------------<  114<H>  4.4   |  29  |  1.10    Ca    8.4<L>      31 May 2024 06:34  Phos  3.5     05-31  Mg     2.5     05-31    TPro  5.1<L>  /  Alb  1.8<L>  /  TBili  0.4  /  DBili  x   /  AST  17  /  ALT  15  /  AlkPhos  47  05-31      Urinalysis Basic - ( 31 May 2024 06:34 )    Color: x / Appearance: x / SG: x / pH: x  Gluc: 114 mg/dL / Ketone: x  / Bili: x / Urobili: x   Blood: x / Protein: x / Nitrite: x   Leuk Esterase: x / RBC: x / WBC x   Sq Epi: x / Non Sq Epi: x / Bacteria: x      ABG - ( 31 May 2024 23:53 )  pH, Arterial: 7.44  pH, Blood: x     /  pCO2: 42    /  pO2: 116   / HCO3: 28    / Base Excess: 4.3   /  SaO2: 99.2        Assessment/Plan  88yFemale admitted for PNA, called for desaturation and tachypnea.  - Pt tachycardic and tachypneic with increased work of breathing, placed on venti mask with improvement in saturation to 92%  - cont pulse ox ordered  - CXR from earlier in the day reviewed, pt remains with pulmonary congestion  - gave 40mg IV lasix x1 ; home oral dose had been held for SELENA  - attempted to suction patient at bedside, got minimal sputum out and pt reported feeling a little better  - s/p suctioning and lasix patient reports feeling somewhat improved, remains tachypneic and tachycardic but both improved to prior  - now saturating 95% on 50% venti mask  - will get ABG  - pt not on DVT ppx due to hematomas; given tachycardia and tachypnea will order CTA to rule out PE  - RN to please call with any changes    ------  ADDENDUM  preliminary CTA report reviewed, pt without PE. Pt saturating well with improvement in tachypnea.

## 2024-05-31 NOTE — PROGRESS NOTE ADULT - PROBLEM SELECTOR PLAN 6
CT A/P: Prominent/normal size spleen- wandering spleen,  enlarged from prior exams. Clinical correlation with regard to LUQ pain recommended  - Likely will req outpatient f/u  - Trend hgb and abdominal closely  - ordered EBV serology with weakness and enlarged spleen  - EBV positive for reactive disease CT A/P: Prominent/normal size spleen- wandering spleen,  enlarged from prior exams. Clinical correlation with regard to LUQ pain recommended  - Likely will req outpatient f/u  - Trend hgb and abdominal closely  - ordered EBV serology with weakness and enlarged spleen, EBV positive for reactive disease

## 2024-05-31 NOTE — PROGRESS NOTE ADULT - PROBLEM SELECTOR PLAN 4
hgh 10.4 -> 8.4 -> 7.7 stable  spoke to patient PCP, patient has hx of mylodyplastic syndrome, requring occasional transfusions. with transfuse 1prbc prior to dc.   active type and screen 5/29  blood consent in chart  no hx of cad or heart disease, transfuse<7  b12 elevated, ferritin elevated, anemia of chronic disease

## 2024-05-31 NOTE — PROGRESS NOTE ADULT - PROBLEM SELECTOR PLAN 3
s/p unwitnessed fall at home alone this AM. Hematomas of R frontotemporal region and R shoulder. + rib fractures  - May be 2/2 acute infection + dehydration from decreased PO intake. Denies LOC though unclear  - CT Chest: acute R 6th and 7th rib fractures (see report)  - CT Head: Right anterolateral extra calvarial soft tissue swelling/hematoma. No hemorrhage  - standing tyleol for pain management.  - incetive spirometry for lung improvement  - Check orthostatics   - fall risk protocol s/p unwitnessed fall at home alone this AM. Hematomas of R frontotemporal region and R shoulder. + rib fractures  - Fall may be 2/2 acute infection + dehydration from decreased PO intake. Denies LOC though unclear  - CT Chest: acute R 6th and 7th rib fractures (see report)  - CT Head: Right anterolateral extra calvarial soft tissue swelling/hematoma. No hemorrhage  - standing tyleol for pain management.  - incetive spirometry for lung improvement  - Check orthostatics   - fall risk protocol

## 2024-05-31 NOTE — PROGRESS NOTE ADULT - PROBLEM SELECTOR PLAN 10
SCDs given hematomas -> will monitor h/h if stable, will considered chemical dvt    PCP: Dr. Cabral  [phone]  attempted to reach PCP office, no response SCDs given hematomas -> will monitor h/h if stable, will considered chemical dvt    PCP: Dr. Cabral  [phone]

## 2024-05-31 NOTE — PROGRESS NOTE ADULT - SUBJECTIVE AND OBJECTIVE BOX
Patient is a 88y old  Female who presents with a chief complaint of PNA (31 May 2024 12:16)      Subjective:  INTERVAL HPI/OVERNIGHT EVENTS: Patient seen and examined at bedside. No overnight events occurred. Patient has no complaints at this time. Denies fevers, chills, headache, lightheadedness, chest pain, dyspnea, abdominal pain, n/v/d/c. patient appears to be breathing better and able to take increased breaths without much discomfort.     MEDICATIONS  (STANDING):  acetaminophen     Tablet .. 1000 milliGRAM(s) Oral every 8 hours  albuterol/ipratropium for Nebulization 3 milliLiter(s) Nebulizer every 6 hours  cefTRIAXone   IVPB 1000 milliGRAM(s) IV Intermittent every 24 hours  cholecalciferol 1000 Unit(s) Oral daily  cyanocobalamin 1000 MICROGram(s) Oral daily  donepezil 10 milliGRAM(s) Oral at bedtime  guaiFENesin Oral Liquid (Sugar-Free) 200 milliGRAM(s) Oral every 6 hours  iron sucrose IVPB 100 milliGRAM(s) IV Intermittent every 24 hours  lidocaine   4% Patch 1 Patch Transdermal every 24 hours  memantine 10 milliGRAM(s) Oral two times a day  mirtazapine 15 milliGRAM(s) Oral at bedtime  mupirocin 2% Ointment 1 Application(s) Both Nostrils two times a day    MEDICATIONS  (PRN):  aluminum hydroxide/magnesium hydroxide/simethicone Suspension 30 milliLiter(s) Oral every 4 hours PRN Dyspepsia  melatonin 3 milliGRAM(s) Oral at bedtime PRN Insomnia  ondansetron Injectable 4 milliGRAM(s) IV Push every 8 hours PRN Nausea and/or Vomiting  traMADol 50 milliGRAM(s) Oral every 6 hours PRN Severe Pain (7 - 10)  traMADol 25 milliGRAM(s) Oral every 6 hours PRN Moderate Pain (4 - 6)      Allergies    sulfa drugs (Short breath)  adhesives (Hives)  PC Pen VK (Other (Mild to Mod))    Intolerances        REVIEW OF SYSTEMS:  CONSTITUTIONAL: No fever or chills  HEENT:  No headache, no sore throat  RESPIRATORY: No cough, wheezing, or shortness of breath  CARDIOVASCULAR: No chest pain, palpitations  GASTROINTESTINAL: No abd pain, nausea, vomiting, or diarrhea  GENITOURINARY: No dysuria, frequency, or hematuria  NEUROLOGICAL: no focal weakness or dizziness  MUSCULOSKELETAL: no myalgias     Objective:  Vital Signs Last 24 Hrs  T(C): 36.6 (31 May 2024 11:30), Max: 36.6 (30 May 2024 13:38)  T(F): 97.9 (31 May 2024 11:30), Max: 97.9 (31 May 2024 11:30)  HR: 79 (31 May 2024 11:30) (74 - 88)  BP: 94/60 (31 May 2024 11:30) (90/53 - 111/62)  BP(mean): --  RR: 28 (31 May 2024 11:30) (17 - 28)  SpO2: 91% (31 May 2024 11:30) (90% - 98%)    Parameters below as of 31 May 2024 11:30  Patient On (Oxygen Delivery Method): nasal cannula  O2 Flow (L/min): 5      GENERAL: NAD, lying in bed comfortably  HEAD:  Atraumatic  EYES: conjunctiva and sclera clear  ENT: Moist mucous membranes  NECK:  No JVD  CHEST/LUNG: patient with mild shallow breathing  HEART: Regular rate and rhythm; No murmurs, rubs, or gallops  ABDOMEN: Soft, Nontender, Nondistended.  EXTREMITIES:  2+ Peripheral Pulses, brisk capillary refill. No clubbing, cyanosis, or edema  NERVOUS SYSTEM:  Alert & Oriented X1  MSK: FROM all 4 extremities, full and equal strength  SKIN: hematomas improving.    LABS:                        7.8    12.46 )-----------( 164      ( 31 May 2024 06:34 )             24.8     CBC Full  -  ( 31 May 2024 06:34 )  WBC Count : 12.46 K/uL  Hemoglobin : 7.8 g/dL  Hematocrit : 24.8 %  Platelet Count - Automated : 164 K/uL  Mean Cell Volume : 112.7 fl  Mean Cell Hemoglobin : 35.5 pg  Mean Cell Hemoglobin Concentration : 31.5 gm/dL  Auto Neutrophil # : 10.72 K/uL  Auto Lymphocyte # : 0.62 K/uL  Auto Monocyte # : 0.37 K/uL  Auto Eosinophil # : 0.25 K/uL  Auto Basophil # : 0.00 K/uL  Auto Neutrophil % : 85.0 %  Auto Lymphocyte % : 5.0 %  Auto Monocyte % : 3.0 %  Auto Eosinophil % : 2.0 %  Auto Basophil % : 0.0 %    31 May 2024 06:34    139    |  106    |  42     ----------------------------<  114    4.4     |  29     |  1.10     Ca    8.4        31 May 2024 06:34  Phos  3.5       31 May 2024 06:34  Mg     2.5       31 May 2024 06:34    TPro  5.1    /  Alb  1.8    /  TBili  0.4    /  DBili  x      /  AST  17     /  ALT  15     /  AlkPhos  47     31 May 2024 06:34      Urinalysis Basic - ( 31 May 2024 06:34 )    Color: x / Appearance: x / SG: x / pH: x  Gluc: 114 mg/dL / Ketone: x  / Bili: x / Urobili: x   Blood: x / Protein: x / Nitrite: x   Leuk Esterase: x / RBC: x / WBC x   Sq Epi: x / Non Sq Epi: x / Bacteria: x      CAPILLARY BLOOD GLUCOSE            Culture - Blood (collected 05-27-24 @ 09:35)  Source: .Blood Blood-Peripheral  Preliminary Report (05-30-24 @ 16:01):    No growth at 72 Hours    Culture - Urine (collected 05-27-24 @ 09:30)  Source: Clean Catch Clean Catch (Midstream)  Final Report (05-28-24 @ 15:03):    >=3 organisms. Probable collection contamination.    Culture - Blood (collected 05-27-24 @ 09:30)  Source: .Blood Blood-Peripheral  Preliminary Report (05-30-24 @ 16:01):    No growth at 72 Hours        RADIOLOGY & ADDITIONAL TESTS:    Personally reviewed.     Consultant(s) Notes Reviewed:  [x] YES  [ ] NO     Patient is a 88y old  Female who presents with a chief complaint of PNA (31 May 2024 12:16)      Subjective:  INTERVAL HPI/OVERNIGHT EVENTS: Patient seen and examined at bedside. No overnight events occurred. Patient has no complaints at this time. Denies fevers, chills, headache, lightheadedness, chest pain, dyspnea, abdominal pain, n/v/d/c. patient appears to be breathing better and able to take increased breaths without much discomfort.     MEDICATIONS  (STANDING):  acetaminophen     Tablet .. 1000 milliGRAM(s) Oral every 8 hours  albuterol/ipratropium for Nebulization 3 milliLiter(s) Nebulizer every 6 hours  cefTRIAXone   IVPB 1000 milliGRAM(s) IV Intermittent every 24 hours  cholecalciferol 1000 Unit(s) Oral daily  cyanocobalamin 1000 MICROGram(s) Oral daily  donepezil 10 milliGRAM(s) Oral at bedtime  guaiFENesin Oral Liquid (Sugar-Free) 200 milliGRAM(s) Oral every 6 hours  iron sucrose IVPB 100 milliGRAM(s) IV Intermittent every 24 hours  lidocaine   4% Patch 1 Patch Transdermal every 24 hours  memantine 10 milliGRAM(s) Oral two times a day  mirtazapine 15 milliGRAM(s) Oral at bedtime  mupirocin 2% Ointment 1 Application(s) Both Nostrils two times a day    MEDICATIONS  (PRN):  aluminum hydroxide/magnesium hydroxide/simethicone Suspension 30 milliLiter(s) Oral every 4 hours PRN Dyspepsia  melatonin 3 milliGRAM(s) Oral at bedtime PRN Insomnia  ondansetron Injectable 4 milliGRAM(s) IV Push every 8 hours PRN Nausea and/or Vomiting  traMADol 50 milliGRAM(s) Oral every 6 hours PRN Severe Pain (7 - 10)  traMADol 25 milliGRAM(s) Oral every 6 hours PRN Moderate Pain (4 - 6)      Allergies    sulfa drugs (Short breath)  adhesives (Hives)  PC Pen VK (Other (Mild to Mod))    Intolerances        REVIEW OF SYSTEMS:  CONSTITUTIONAL: No fever or chills  HEENT:  No headache, no sore throat  RESPIRATORY: No cough, wheezing, or shortness of breath  CARDIOVASCULAR: No chest pain, palpitations  GASTROINTESTINAL: No abd pain, nausea, vomiting, or diarrhea  GENITOURINARY: No dysuria, frequency, or hematuria  NEUROLOGICAL: no focal weakness or dizziness  MUSCULOSKELETAL: no myalgias     Objective:  Vital Signs Last 24 Hrs  T(C): 36.6 (31 May 2024 11:30), Max: 36.6 (30 May 2024 13:38)  T(F): 97.9 (31 May 2024 11:30), Max: 97.9 (31 May 2024 11:30)  HR: 79 (31 May 2024 11:30) (74 - 88)  BP: 94/60 (31 May 2024 11:30) (90/53 - 111/62)  BP(mean): --  RR: 28 (31 May 2024 11:30) (17 - 28)  SpO2: 91% (31 May 2024 11:30) (90% - 98%)    Parameters below as of 31 May 2024 11:30  Patient On (Oxygen Delivery Method): nasal cannula  O2 Flow (L/min): 5      GENERAL: NAD, lying in bed comfortably  HEAD:  Atraumatic  EYES: conjunctiva and sclera clear  ENT: Moist mucous membranes  NECK:  No JVD  CHEST/LUNG: patient with mild shallow breathing  HEART: Regular rate and rhythm; No murmurs, rubs, or gallops  ABDOMEN: Soft, Nontender, Nondistended.  EXTREMITIES:  2+ Peripheral Pulses, brisk capillary refill. No clubbing, cyanosis, or edema  NERVOUS SYSTEM:  Alert & Oriented X1  MSK: FROM all 4 extremities, full and equal strength  SKIN: scattered ecchymoses/hematomas improving.    LABS:                        7.8    12.46 )-----------( 164      ( 31 May 2024 06:34 )             24.8     CBC Full  -  ( 31 May 2024 06:34 )  WBC Count : 12.46 K/uL  Hemoglobin : 7.8 g/dL  Hematocrit : 24.8 %  Platelet Count - Automated : 164 K/uL  Mean Cell Volume : 112.7 fl  Mean Cell Hemoglobin : 35.5 pg  Mean Cell Hemoglobin Concentration : 31.5 gm/dL  Auto Neutrophil # : 10.72 K/uL  Auto Lymphocyte # : 0.62 K/uL  Auto Monocyte # : 0.37 K/uL  Auto Eosinophil # : 0.25 K/uL  Auto Basophil # : 0.00 K/uL  Auto Neutrophil % : 85.0 %  Auto Lymphocyte % : 5.0 %  Auto Monocyte % : 3.0 %  Auto Eosinophil % : 2.0 %  Auto Basophil % : 0.0 %    31 May 2024 06:34    139    |  106    |  42     ----------------------------<  114    4.4     |  29     |  1.10     Ca    8.4        31 May 2024 06:34  Phos  3.5       31 May 2024 06:34  Mg     2.5       31 May 2024 06:34    TPro  5.1    /  Alb  1.8    /  TBili  0.4    /  DBili  x      /  AST  17     /  ALT  15     /  AlkPhos  47     31 May 2024 06:34      Urinalysis Basic - ( 31 May 2024 06:34 )    Color: x / Appearance: x / SG: x / pH: x  Gluc: 114 mg/dL / Ketone: x  / Bili: x / Urobili: x   Blood: x / Protein: x / Nitrite: x   Leuk Esterase: x / RBC: x / WBC x   Sq Epi: x / Non Sq Epi: x / Bacteria: x      CAPILLARY BLOOD GLUCOSE            Culture - Blood (collected 05-27-24 @ 09:35)  Source: .Blood Blood-Peripheral  Preliminary Report (05-30-24 @ 16:01):    No growth at 72 Hours    Culture - Urine (collected 05-27-24 @ 09:30)  Source: Clean Catch Clean Catch (Midstream)  Final Report (05-28-24 @ 15:03):    >=3 organisms. Probable collection contamination.    Culture - Blood (collected 05-27-24 @ 09:30)  Source: .Blood Blood-Peripheral  Preliminary Report (05-30-24 @ 16:01):    No growth at 72 Hours        RADIOLOGY & ADDITIONAL TESTS:    Personally reviewed.     Consultant(s) Notes Reviewed:  [x] YES  [ ] NO

## 2024-05-31 NOTE — SOCIAL WORK PROGRESS NOTE - NSSWPROGRESSNOTE_GEN_ALL_CORE
family prefers pt go to [facility] once medically stable for discharge. Per MD possible dc in the am. If medically stable pt can come on the weekend please call [facility] admission director cell # [phone]. Sw will continue to remain available.

## 2024-05-31 NOTE — SOCIAL WORK PROGRESS NOTE - NSSWPROGRESSNOTE_GEN_ALL_CORE
Daughter requests a referral to [facility] and [facility] rehab. Sw will continue to follow for safe dc planning.

## 2024-05-31 NOTE — PROGRESS NOTE ADULT - PROBLEM SELECTOR PLAN 1
Productive cough, weakness, decreased PO intake x 1week following large social gathering. Likely CAP Bacterial vs. Viral  - Tachypnea, Leukocytosis, elevated lactate, hypotensive on admission, +imaging meets sepsis criteria  - CT Chest:  Bilateral lung patchy airspace and groundglass opacities. Chronic underlying lung disease cannot be distinguished from multifocal bronchopneumonia or combination of both. Small dependent pleural effusions  - rvp negative  - strep ag +, leg -, repeat strep now negative  - c/w ceftriaxone until course completion (5/31 last day)  - Tucker  - ID (Dr. Dozier) consult Productive cough, weakness, decreased PO intake  - PNA- CAP vs. Viral  - Tachypnea, Leukocytosis, elevated lactate, hypotensive on admission. Sepsis POA  - CT Chest:  Bilateral lung patchy airspace and groundglass opacities. Chronic underlying lung disease cannot be distinguished from multifocal bronchopneumonia or combination of both. Small dependent pleural effusions  - rvp negative  - strep ag +, leg -, repeat strep now negative  - c/w ceftriaxone until course completion (5/31 last day)  - Carlisin  - ID (Dr. Dozier) consult

## 2024-06-01 NOTE — PROGRESS NOTE ADULT - PROBLEM SELECTOR PLAN 3
s/p unwitnessed fall at home alone this AM. Hematomas of R frontotemporal region and R shoulder. + rib fractures  - May be 2/2 acute infection + dehydration from decreased PO intake. Denies LOC though unclear  - CT Chest: acute R 6th and 7th rib fractures (see report)  - CT Head: Right anterolateral extra calvarial soft tissue swelling/hematoma. No hemorrhage  - Pain management: Ofirmev x4 doses, Start PO tylenol tomorrow, lidocaine patch, tramadol 25mg prn mod pain, 50mg severe pain  - Check orthostatics   - fall risk protocol Mon titers illustrative of reactivation of virus  symptomatic treatment  possible etiology for enlarged spleen

## 2024-06-01 NOTE — PROGRESS NOTE ADULT - PROBLEM SELECTOR PLAN 4
hgh 10.4 -> 8.4 -> 7.7 stable  dilutional vs acute loss in hematomas?  active type and screen 5/29  blood consent in chart  no hx of cad or heart disease, transfuse<7  b12 elevated, ferritin elevated, anemia of chronic disease s/p unwitnessed fall at home alone this AM. Hematomas of R frontotemporal region and R shoulder. + rib fractures  - May be 2/2 acute infection + dehydration from decreased PO intake. Denies LOC though unclear  - CT Chest: acute R 6th and 7th rib fractures (see report)  - CT Head: Right anterolateral extra calvarial soft tissue swelling/hematoma. No hemorrhage  - Pain management: Ofirmev x4 doses, Start PO tylenol tomorrow, lidocaine patch, tramadol 25mg prn mod pain, 50mg severe pain  - Check orthostatics   - fall risk protocol Dr. Marsh

## 2024-06-01 NOTE — PROGRESS NOTE ADULT - PROBLEM SELECTOR PLAN 2
Mon titers illustrative of reactivation of virus  symptomatic treatment  possible etiology for enlarged spleen Productive cough, weakness, decreased PO intake x 1week following large social gathering. Likely CAP Bacterial vs. Viral  - Tachypnea, Leukocytosis, elevated lactate, hypotensive on admission, +imaging meets sepsis criteria  - CT Chest:  Bilateral lung patchy airspace and groundglass opacities. Chronic underlying lung disease cannot be distinguished from multifocal bronchopneumonia or combination of both. Small dependent pleural effusions  - rvp negative  - strep ag +, leg -, repeat strep now negative  - s/p course of Rocephin  - WBC trending upward 6/1 s/p completion of Rocephin. will monitor off abx, likely reactive with stress response.   - Brandenitussin  - ID (Dr. Dozier) consult

## 2024-06-01 NOTE — PROGRESS NOTE ADULT - PROBLEM SELECTOR PLAN 8
AOx2 at baseline. At baseline on admission   - CT Head neg for acute stroke  - Cont donepezil, mirtazepine  - Memantine ER 21mg non-formulary --> 10mg BID Septic 2/2 PNA + UTI.  asymptomatic  - UA: cloudy, +LE, 15 WBC, few bacteria   - Cont Ceftriaxone  - f/u Urine cx  - I/Os, trend renal function  - encourage PO intake

## 2024-06-01 NOTE — PROGRESS NOTE ADULT - PROBLEM SELECTOR PLAN 6
CT A/P: Prominent/normal size spleen- wandering spleen,  enlarged from prior exams. Clinical correlation with regard to LUQ pain recommended  - Likely will req outpatient f/u  - Trend hgb and abdominal closely  - ordered EBV serology with weakness and enlarged spleen  - EBV positive for reactive disease No reported h/o CHF  - pro-BNP 5300 on admission  - CT Chest: +small dependent pleural effusions bilaterally  - Takes lasix 20mg at home for leg swelling  - f/u TTE -> estimated at 55 to 60 %, Estimated pulmonary artery systolic pressure is 45 mmHg, consistent with mild pulmonary hypertension.  - IV lasix 40mg x1 given at ON 5/31-6/1 -> patient would benfit from daily lasix dose but with consideration of low BP, may start with low oral dose QD with closer BP monitoring.

## 2024-06-01 NOTE — PROGRESS NOTE ADULT - PROBLEM SELECTOR PLAN 5
No reported h/o CHF  - pro-BNP 5300 on admission  - CT Chest: +small dependent pleural effusions bilaterally  - Takes lasix 20mg at home for leg swelling  - f/u TTE -> estimated at 55 to 60 %, Estimated pulmonary artery systolic pressure is 45 mmHg, consistent with mild pulmonary hypertension.  - IV lasix 40mg x1 given at ON 5/31-6/1 -> patient would benfit from daily lasix dose but with consideration of low BP, may start with low oral dose QD with closer BP monitoring. hgh 10.4 -> 8.4 -> 7.7 -> 9.6 s/p 1 unit pRBC  active type and screen 5/29  no hx of cad or heart disease, transfuse<7  b12 elevated, ferritin elevated, anemia of chronic disease

## 2024-06-01 NOTE — PROGRESS NOTE ADULT - PROBLEM SELECTOR PLAN 1
Productive cough, weakness, decreased PO intake x 1week following large social gathering. Likely CAP Bacterial vs. Viral  - Tachypnea, Leukocytosis, elevated lactate, hypotensive on admission, +imaging meets sepsis criteria  - CT Chest:  Bilateral lung patchy airspace and groundglass opacities. Chronic underlying lung disease cannot be distinguished from multifocal bronchopneumonia or combination of both. Small dependent pleural effusions  - rvp negative  - strep ag +, leg -, repeat strep now negative  - c/w ceftriaxone -> now complete.  - WBC trending upward 6/1 s/p completion of Rocephin. will monitor off abx, likely reactive with stress response.   - Brandenitussin  - ID (Dr. Dozier) consult - s/p 1 unit of pRBC  - CTA chest negative for PE, increase b/l effusion  - TTE -> estimated at 55 to 60 %, Estimated pulmonary artery systolic pressure is 45 mmHg, consistent with mild pulmonary hypertension.  - IV lasix given overnight. Will start on lasix 20mg PO given patient soft bp  - titrate off supplement O2 with goal O2 90-94%

## 2024-06-01 NOTE — PROGRESS NOTE ADULT - PROBLEM SELECTOR PLAN 10
SCDs given hematomas -> will monitor h/h if stable, will considered chemical dvt    PCP: Dr. Cabral  [phone]  attempted to reach PCP office, no response

## 2024-06-01 NOTE — PROVIDER CONTACT NOTE (OTHER) - ASSESSMENT
tachy/crackles, expiratory wheeze
HR elevated. Patient looks in distress. BP: 165/86, HR: 129, OxSat 90%. RR: 28

## 2024-06-01 NOTE — PROGRESS NOTE ADULT - SUBJECTIVE AND OBJECTIVE BOX
Patient is a 88y old  Female who presents with a chief complaint of PNA (31 May 2024 12:51)      Subjective:  INTERVAL HPI/OVERNIGHT EVENTS: Patient seen and examined at bedside. ON chart note, MD called for tachypicnic, decreased O2 saturation, tachycardia. Patient was hypoxic to 85% placed on venti mask and saturated back to >93%. Patient was givin IV lasix 40mg and put out roughly 750ml urine output. Patient also recieved cta to r/o PE, which was negative. Patient this AM continues to be on venti mask. RR 26.      MEDICATIONS  (STANDING):  acetaminophen     Tablet .. 1000 milliGRAM(s) Oral every 8 hours  albuterol/ipratropium for Nebulization 3 milliLiter(s) Nebulizer every 6 hours  cholecalciferol 1000 Unit(s) Oral daily  cyanocobalamin 1000 MICROGram(s) Oral daily  donepezil 10 milliGRAM(s) Oral at bedtime  guaiFENesin Oral Liquid (Sugar-Free) 200 milliGRAM(s) Oral every 6 hours  lidocaine   4% Patch 1 Patch Transdermal every 24 hours  memantine 10 milliGRAM(s) Oral two times a day  mirtazapine 15 milliGRAM(s) Oral at bedtime  mupirocin 2% Ointment 1 Application(s) Both Nostrils two times a day    MEDICATIONS  (PRN):  aluminum hydroxide/magnesium hydroxide/simethicone Suspension 30 milliLiter(s) Oral every 4 hours PRN Dyspepsia  melatonin 3 milliGRAM(s) Oral at bedtime PRN Insomnia  ondansetron Injectable 4 milliGRAM(s) IV Push every 8 hours PRN Nausea and/or Vomiting  traMADol 25 milliGRAM(s) Oral every 6 hours PRN Moderate Pain (4 - 6)  traMADol 50 milliGRAM(s) Oral every 6 hours PRN Severe Pain (7 - 10)      Allergies    sulfa drugs (Short breath)  adhesives (Hives)  PC Pen VK (Other (Mild to Mod))    Intolerances        REVIEW OF SYSTEMS:  CONSTITUTIONAL: No fever or chills  HEENT:  No headache, no sore throat  RESPIRATORY: + SOB  CARDIOVASCULAR: No chest pain, palpitations  GASTROINTESTINAL: No abd pain, nausea, vomiting, or diarrhea  GENITOURINARY: No dysuria, frequency, or hematuria  NEUROLOGICAL: no focal weakness or dizziness  MUSCULOSKELETAL: no myalgias     Objective:  Vital Signs Last 24 Hrs  T(C): 36.4 (01 Jun 2024 04:53), Max: 37.6 (31 May 2024 14:20)  T(F): 97.5 (01 Jun 2024 04:53), Max: 99.6 (31 May 2024 14:20)  HR: 84 (01 Jun 2024 08:50) (69 - 130)  BP: 91/58 (01 Jun 2024 08:50) (91/58 - 165/86)  BP(mean): --  RR: 28 (01 Jun 2024 08:50) (16 - 28)  SpO2: 97% (01 Jun 2024 08:50) (90% - 99%)    Parameters below as of 01 Jun 2024 08:50  Patient On (Oxygen Delivery Method): mask, Venturi    O2 Concentration (%): 50    GENERAL: minor distress, RR elevated on venti mask  HEAD:  Atraumatic,  EYES: conjunctiva and sclera clear  ENT: Moist mucous membranes  NECK: No JVD  CHEST/LUNG:  lung sounds with b/l rales in lower lobe fields  HEART: Regular rate and rhythm;  ABDOMEN: Soft, Nontender, Nondistended  EXTREMITIES:  2+ Peripheral Pulses, brisk capillary refill  NERVOUS SYSTEM:  Alert & Oriented X3,  MSK: FROM all 4 extremities  SKIN: No rashes or lesions    LABS:                        9.6    17.65 )-----------( 172      ( 01 Jun 2024 05:55 )             29.3     CBC Full  -  ( 01 Jun 2024 05:55 )  WBC Count : 17.65 K/uL  Hemoglobin : 9.6 g/dL  Hematocrit : 29.3 %  Platelet Count - Automated : 172 K/uL  Mean Cell Volume : 106.2 fl  Mean Cell Hemoglobin : 34.8 pg  Mean Cell Hemoglobin Concentration : 32.8 gm/dL  Auto Neutrophil # : 15.39 K/uL  Auto Lymphocyte # : 0.58 K/uL  Auto Monocyte # : 0.48 K/uL  Auto Eosinophil # : 0.09 K/uL  Auto Basophil # : 0.06 K/uL  Auto Neutrophil % : 87.3 %  Auto Lymphocyte % : 3.3 %  Auto Monocyte % : 2.7 %  Auto Eosinophil % : 0.5 %  Auto Basophil % : 0.3 %    01 Jun 2024 05:55    140    |  107    |  38     ----------------------------<  120    4.1     |  29     |  1.00     Ca    8.2        01 Jun 2024 05:55  Phos  3.2       01 Jun 2024 05:55  Mg     2.6       01 Jun 2024 05:55    TPro  5.6    /  Alb  1.9    /  TBili  0.6    /  DBili  x      /  AST  26     /  ALT  23     /  AlkPhos  63     01 Jun 2024 05:55      Urinalysis Basic - ( 01 Jun 2024 05:55 )    Color: x / Appearance: x / SG: x / pH: x  Gluc: 120 mg/dL / Ketone: x  / Bili: x / Urobili: x   Blood: x / Protein: x / Nitrite: x   Leuk Esterase: x / RBC: x / WBC x   Sq Epi: x / Non Sq Epi: x / Bacteria: x      CAPILLARY BLOOD GLUCOSE            Culture - Blood (collected 05-27-24 @ 09:35)  Source: .Blood Blood-Peripheral  Preliminary Report (05-31-24 @ 16:00):    No growth at 4 days    Culture - Urine (collected 05-27-24 @ 09:30)  Source: Clean Catch Clean Catch (Midstream)  Final Report (05-28-24 @ 15:03):    >=3 organisms. Probable collection contamination.    Culture - Blood (collected 05-27-24 @ 09:30)  Source: .Blood Blood-Peripheral  Preliminary Report (05-31-24 @ 16:00):    No growth at 4 days        RADIOLOGY & ADDITIONAL TESTS:    Personally reviewed.     Consultant(s) Notes Reviewed:  [x] YES  [ ] NO     Patient is a 88y old  Female who presents with a chief complaint of PNA (31 May 2024 12:51)      Subjective:  INTERVAL HPI/OVERNIGHT EVENTS: Patient seen and examined at bedside. ON chart note, MD called for tachypenic, decreased O2 saturation, tachycardia. Patient was hypoxic to 85% placed on venti mask and saturated back to >93%. Patient was givin IV lasix 40mg and put out roughly 750ml urine output. Patient also recieved cta to r/o PE, which was negative. Patient this AM continues to be on venti mask. RR 26.      MEDICATIONS  (STANDING):  acetaminophen     Tablet .. 1000 milliGRAM(s) Oral every 8 hours  albuterol/ipratropium for Nebulization 3 milliLiter(s) Nebulizer every 6 hours  cholecalciferol 1000 Unit(s) Oral daily  cyanocobalamin 1000 MICROGram(s) Oral daily  donepezil 10 milliGRAM(s) Oral at bedtime  guaiFENesin Oral Liquid (Sugar-Free) 200 milliGRAM(s) Oral every 6 hours  lidocaine   4% Patch 1 Patch Transdermal every 24 hours  memantine 10 milliGRAM(s) Oral two times a day  mirtazapine 15 milliGRAM(s) Oral at bedtime  mupirocin 2% Ointment 1 Application(s) Both Nostrils two times a day    MEDICATIONS  (PRN):  aluminum hydroxide/magnesium hydroxide/simethicone Suspension 30 milliLiter(s) Oral every 4 hours PRN Dyspepsia  melatonin 3 milliGRAM(s) Oral at bedtime PRN Insomnia  ondansetron Injectable 4 milliGRAM(s) IV Push every 8 hours PRN Nausea and/or Vomiting  traMADol 25 milliGRAM(s) Oral every 6 hours PRN Moderate Pain (4 - 6)  traMADol 50 milliGRAM(s) Oral every 6 hours PRN Severe Pain (7 - 10)      Allergies    sulfa drugs (Short breath)  adhesives (Hives)  PC Pen VK (Other (Mild to Mod))    Intolerances        REVIEW OF SYSTEMS:  CONSTITUTIONAL: No fever or chills  HEENT:  No headache, no sore throat  RESPIRATORY: + SOB  CARDIOVASCULAR: No chest pain, palpitations  GASTROINTESTINAL: No abd pain, nausea, vomiting, or diarrhea  GENITOURINARY: No dysuria, frequency, or hematuria  NEUROLOGICAL: no focal weakness or dizziness  MUSCULOSKELETAL: no myalgias     Objective:  Vital Signs Last 24 Hrs  T(C): 36.4 (01 Jun 2024 04:53), Max: 37.6 (31 May 2024 14:20)  T(F): 97.5 (01 Jun 2024 04:53), Max: 99.6 (31 May 2024 14:20)  HR: 84 (01 Jun 2024 08:50) (69 - 130)  BP: 91/58 (01 Jun 2024 08:50) (91/58 - 165/86)  BP(mean): --  RR: 28 (01 Jun 2024 08:50) (16 - 28)  SpO2: 97% (01 Jun 2024 08:50) (90% - 99%)    Parameters below as of 01 Jun 2024 08:50  Patient On (Oxygen Delivery Method): mask, Venturi    O2 Concentration (%): 50    GENERAL: minor distress, RR elevated on venti mask  HEAD:  Atraumatic,  EYES: conjunctiva and sclera clear  ENT: Moist mucous membranes  NECK: No JVD  CHEST/LUNG:  lung sounds with b/l rales in lower lobe fields  HEART: Regular rate and rhythm;  ABDOMEN: Soft, Nontender, Nondistended  EXTREMITIES:  2+ Peripheral Pulses, brisk capillary refill  NERVOUS SYSTEM:  Alert & Oriented X3,  MSK: FROM all 4 extremities  SKIN: No rashes or lesions    LABS:                        9.6    17.65 )-----------( 172      ( 01 Jun 2024 05:55 )             29.3     CBC Full  -  ( 01 Jun 2024 05:55 )  WBC Count : 17.65 K/uL  Hemoglobin : 9.6 g/dL  Hematocrit : 29.3 %  Platelet Count - Automated : 172 K/uL  Mean Cell Volume : 106.2 fl  Mean Cell Hemoglobin : 34.8 pg  Mean Cell Hemoglobin Concentration : 32.8 gm/dL  Auto Neutrophil # : 15.39 K/uL  Auto Lymphocyte # : 0.58 K/uL  Auto Monocyte # : 0.48 K/uL  Auto Eosinophil # : 0.09 K/uL  Auto Basophil # : 0.06 K/uL  Auto Neutrophil % : 87.3 %  Auto Lymphocyte % : 3.3 %  Auto Monocyte % : 2.7 %  Auto Eosinophil % : 0.5 %  Auto Basophil % : 0.3 %    01 Jun 2024 05:55    140    |  107    |  38     ----------------------------<  120    4.1     |  29     |  1.00     Ca    8.2        01 Jun 2024 05:55  Phos  3.2       01 Jun 2024 05:55  Mg     2.6       01 Jun 2024 05:55    TPro  5.6    /  Alb  1.9    /  TBili  0.6    /  DBili  x      /  AST  26     /  ALT  23     /  AlkPhos  63     01 Jun 2024 05:55      Urinalysis Basic - ( 01 Jun 2024 05:55 )    Color: x / Appearance: x / SG: x / pH: x  Gluc: 120 mg/dL / Ketone: x  / Bili: x / Urobili: x   Blood: x / Protein: x / Nitrite: x   Leuk Esterase: x / RBC: x / WBC x   Sq Epi: x / Non Sq Epi: x / Bacteria: x      CAPILLARY BLOOD GLUCOSE            Culture - Blood (collected 05-27-24 @ 09:35)  Source: .Blood Blood-Peripheral  Preliminary Report (05-31-24 @ 16:00):    No growth at 4 days    Culture - Urine (collected 05-27-24 @ 09:30)  Source: Clean Catch Clean Catch (Midstream)  Final Report (05-28-24 @ 15:03):    >=3 organisms. Probable collection contamination.    Culture - Blood (collected 05-27-24 @ 09:30)  Source: .Blood Blood-Peripheral  Preliminary Report (05-31-24 @ 16:00):    No growth at 4 days        RADIOLOGY & ADDITIONAL TESTS:    Personally reviewed.     Consultant(s) Notes Reviewed:  [x] YES  [ ] NO

## 2024-06-01 NOTE — PROVIDER CONTACT NOTE (OTHER) - SITUATION
Pt presenting with new onset tachypnea as well as sinus tachycardia
Patient complaining of difficulty breathing, was saturating low on 5L NC, Respiratory placed her on venturi mask. HR elevated. Patient looks in distress. BP: 165/86, HR: 129, OxSat 90%. RR: 28.

## 2024-06-01 NOTE — PROGRESS NOTE ADULT - PROBLEM SELECTOR PLAN 9
SCDs given hematomas -> will monitor h/h if stable, will considered chemical dvt    PCP: Dr. Cabral  [phone]  attempted to reach PCP office, no response AOx2 at baseline. At baseline on admission   - CT Head neg for acute stroke  - Cont donepezil, mirtazepine  - Memantine ER 21mg non-formulary --> 10mg BID

## 2024-06-02 NOTE — PROGRESS NOTE ADULT - PROBLEM SELECTOR PLAN 4
s/p unwitnessed fall at home alone this AM. Hematomas of R frontotemporal region and R shoulder. + rib fractures  - May be 2/2 acute infection + dehydration from decreased PO intake. Denies LOC though unclear  - CT Chest: acute R 6th and 7th rib fractures (see report)  - CT Head: Right anterolateral extra calvarial soft tissue swelling/hematoma. No hemorrhage  - Pain management: Ofirmev x4 doses, Start PO tylenol tomorrow, lidocaine patch, tramadol 25mg prn mod pain, 50mg severe pain  - Check orthostatics   - fall risk protocol s/p unwitnessed fall at home alone this AM. Hematomas of R frontotemporal region and R shoulder. + rib fractures  - May be 2/2 acute infection + dehydration from decreased PO intake. Denies LOC though unclear  - CT Chest: acute R 6th and 7th rib fractures (see report)  - CT Head: Right anterolateral extra calvarial soft tissue swelling/hematoma. No hemorrhage  - Pain management: Ofirmev x4 doses,  PO tylenol tomorrow, lidocaine patch, tramadol 25mg prn mod pain, 50mg severe pain  - Check orthostatics   - fall risk protocol

## 2024-06-02 NOTE — PROGRESS NOTE ADULT - PROBLEM SELECTOR PLAN 6
No reported h/o CHF  - pro-BNP 5300 on admission  - CT Chest: +small dependent pleural effusions bilaterally  - Takes lasix 20mg at home for leg swelling  - f/u TTE -> estimated at 55 to 60 %, Estimated pulmonary artery systolic pressure is 45 mmHg, consistent with mild pulmonary hypertension.  - Lasix 10mg IV today as above.

## 2024-06-02 NOTE — PROGRESS NOTE ADULT - SUBJECTIVE AND OBJECTIVE BOX
Patient is a 88y old  Female who presents with a chief complaint of PNA (01 Jun 2024 11:58)      INTERVAL HPI/OVERNIGHT EVENTS: no overnight events.. Pt seen and examined at bedside.  Tolerating PO. Denies SOB.  Pt denies any source of pain.   Aid at bedside reports she has been stable.       MEDICATIONS  (STANDING):  acetaminophen     Tablet .. 1000 milliGRAM(s) Oral every 8 hours  albuterol/ipratropium for Nebulization 3 milliLiter(s) Nebulizer every 6 hours  cholecalciferol 1000 Unit(s) Oral daily  cyanocobalamin 1000 MICROGram(s) Oral daily  donepezil 10 milliGRAM(s) Oral at bedtime  furosemide   Injectable 10 milliGRAM(s) IV Push daily  guaiFENesin Oral Liquid (Sugar-Free) 200 milliGRAM(s) Oral every 6 hours  lidocaine   4% Patch 1 Patch Transdermal every 24 hours  memantine 10 milliGRAM(s) Oral two times a day  mirtazapine 15 milliGRAM(s) Oral at bedtime  mupirocin 2% Ointment 1 Application(s) Both Nostrils two times a day    MEDICATIONS  (PRN):  aluminum hydroxide/magnesium hydroxide/simethicone Suspension 30 milliLiter(s) Oral every 4 hours PRN Dyspepsia  melatonin 3 milliGRAM(s) Oral at bedtime PRN Insomnia  ondansetron Injectable 4 milliGRAM(s) IV Push every 8 hours PRN Nausea and/or Vomiting  traMADol 25 milliGRAM(s) Oral every 6 hours PRN Moderate Pain (4 - 6)  traMADol 50 milliGRAM(s) Oral every 6 hours PRN Severe Pain (7 - 10)      Allergies  sulfa drugs (Short breath)  adhesives (Hives)  PC Pen VK (Other (Mild to Mod))    Intolerances      REVIEW OF SYSTEMS:  CONSTITUTIONAL: No fever   RESPIRATORY: No cough, shortness of breath  CARDIOVASCULAR: No chest pain  GASTROINTESTINAL: No abd pain, nausea, vomiting    Vital Signs Last 24 Hrs  T(C): 36.5 (02 Jun 2024 12:36), Max: 36.6 (01 Jun 2024 20:58)  T(F): 97.7 (02 Jun 2024 12:36), Max: 97.9 (01 Jun 2024 20:58)  HR: 94 (02 Jun 2024 13:53) (73 - 95)  BP: 102/57 (02 Jun 2024 13:53) (94/58 - 106/67)  RR: 20 (02 Jun 2024 13:53) (18 - 20)  SpO2: 97% (02 Jun 2024 13:53) (93% - 97%)    Parameters below as of 02 Jun 2024 13:53  Patient On (Oxygen Delivery Method): mask, Venturi    O2 Concentration (%): 50    PHYSICAL EXAM:  GENERAL: NAD with venti-mask O2   HEENT:  anicteric, moist mucous membranes  CHEST/LUNG:  bilateral diffuse rales, more in left side. No wheezes, or rhonchi  HEART:  RRR, S1, S2  ABDOMEN:  BS+, soft, nontender, nondistended  EXTREMITIES: no edema, cyanosis, or calf tenderness  NERVOUS SYSTEM: answers few questions and follows commands appropriately      LABS:                        9.3    15.33 )-----------( 183      ( 02 Jun 2024 08:50 )             29.1     CBC Full  -  ( 02 Jun 2024 08:50 )  WBC Count : 15.33 K/uL  Hemoglobin : 9.3 g/dL  Hematocrit : 29.1 %  Platelet Count - Automated : 183 K/uL  Mean Cell Volume : 109.4 fl  Mean Cell Hemoglobin : 35.0 pg  Mean Cell Hemoglobin Concentration : 32.0 gm/dL  Auto Neutrophil # : 13.27 K/uL  Auto Lymphocyte # : 0.63 K/uL  Auto Monocyte # : 0.38 K/uL  Auto Eosinophil # : 0.14 K/uL  Auto Basophil # : 0.05 K/uL  Auto Neutrophil % : 86.6 %  Auto Lymphocyte % : 4.1 %  Auto Monocyte % : 2.5 %  Auto Eosinophil % : 0.9 %  Auto Basophil % : 0.3 %    02 Jun 2024 08:50    141    |  109    |  39     ----------------------------<  113    4.4     |  29     |  1.00     Ca    8.6        02 Jun 2024 08:50  Phos  3.3       02 Jun 2024 08:50  Mg     2.7       02 Jun 2024 08:50    TPro  5.8    /  Alb  2.0    /  TBili  0.5    /  DBili  x      /  AST  50     /  ALT  42     /  AlkPhos  59     02 Jun 2024 08:50      Urinalysis Basic - ( 02 Jun 2024 08:50 )    Color: x / Appearance: x / SG: x / pH: x  Gluc: 113 mg/dL / Ketone: x  / Bili: x / Urobili: x   Blood: x / Protein: x / Nitrite: x   Leuk Esterase: x / RBC: x / WBC x   Sq Epi: x / Non Sq Epi: x / Bacteria: x      Culture - Blood (collected 05-27-24 @ 09:35)  Source: .Blood Blood-Peripheral  Final Report (06-01-24 @ 16:00):    No growth at 5 days    Culture - Urine (collected 05-27-24 @ 09:30)  Source: Clean Catch Clean Catch (Midstream)  Final Report (05-28-24 @ 15:03):    >=3 organisms. Probable collection contamination.    Culture - Blood (collected 05-27-24 @ 09:30)  Source: .Blood Blood-Peripheral  Final Report (06-01-24 @ 16:01):    No growth at 5 days        RADIOLOGY & ADDITIONAL TESTS:    Personally reviewed.     Consultant(s) Notes Reviewed:  [x] YES  [ ] NO

## 2024-06-02 NOTE — PROGRESS NOTE ADULT - PROBLEM/PLAN-8
Addended by: MÓNICA JOLLEY on: 5/11/2021 03:11 PM     Modules accepted: Orders    
DISPLAY PLAN FREE TEXT

## 2024-06-02 NOTE — SOCIAL WORK PROGRESS NOTE - NSSWPROGRESSNOTE_GEN_ALL_CORE
Per Tx team, pt is on 50% venti mask and her blood pressure was low this morning. Pt remains acute. Sw following.

## 2024-06-02 NOTE — PROGRESS NOTE ADULT - PROBLEM SELECTOR PLAN 2
Productive cough, weakness, decreased PO intake x 1week following large social gathering.   - Sepsis on admission.   - CT Chest:  Bilateral lung patchy airspace and groundglass opacities. Chronic underlying lung disease cannot be distinguished from multifocal bronchopneumonia or combination of both. Small dependent pleural effusions  - strep ag +  - rvp negative and legionella negative.   - s/p course of Rocephin  - WBC elevated s/p completion of Rocephin, no fevers. Will monitor off abx, likely reactive with stress response.   - Duoneb, Robitussin  - negative blood and urine Cx.   - ID (Dr. Dozier) consult

## 2024-06-02 NOTE — PROGRESS NOTE ADULT - PROBLEM SELECTOR PLAN 5
hgh 10.4 -> 8.4 -> 7.7 -> 9.6 s/p 1 unit pRBC  active type and screen 5/29  no hx of cad or heart disease, transfuse<7  b12 elevated, ferritin elevated, anemia of chronic disease

## 2024-06-02 NOTE — PROGRESS NOTE ADULT - PROBLEM SELECTOR PLAN 1
AHRF 2/2 PNA on admission.   - Increased O2 requirements on 5/31   - CTA chest 6/01 negative for PE, increase b/l effusion  - TTE -> estimated at 55 to 60 %, mild pulmonary hypertension.  - s/p 1 unit of pRBC, Lasix was given 5/31 and hold then due to low BP  - Midodrine 5mg PO x1 + Lasix 10mg IV given today.   - Daily diuresis as tolerates   - titrate off supplement O2 with goal O2 90-94% AHRF 2/2 PNA on admission.   - Increased O2 requirements on 5/31   - CTA chest 6/01 negative for PE, increase b/l effusion  - TTE -> estimated at 55 to 60 %, mild pulmonary hypertension.  - s/p 1 unit of pRBC, Lasix was given 5/31 and held then due to low BP  - Midodrine 5mg PO x1 + Lasix 10mg IV given today.   - Daily diuresis as tolerates   - titrate off supplement O2 with goal O2 90-94%

## 2024-06-02 NOTE — PROGRESS NOTE ADULT - PROBLEM SELECTOR PLAN 7
CT A/P: Prominent/normal size spleen- wandering spleen,  enlarged from prior exams. Clinical correlation with regard to LUQ pain recommended  - Trend hgb and abdominal closely  - + weakness and enlarged spleen  - EBV positive for reactive disease

## 2024-06-02 NOTE — PROGRESS NOTE ADULT - PROBLEM SELECTOR PLAN 9
SCDs given hematomas and s/p PRBC    PCP: Dr. Cabral  [phone]  attempted to reach PCP office, no response

## 2024-06-03 NOTE — SOCIAL WORK PROGRESS NOTE - NSSWPROGRESSNOTE_GEN_ALL_CORE
Per am rounds, pt remains acute on venturi mask. [facility] and [facility] provided with update. SW to remains available and continue to follow up.

## 2024-06-03 NOTE — DIETITIAN INITIAL EVALUATION ADULT - PROBLEM SELECTOR PLAN 1
Productive cough, weakness, decreased PO intake x 1week following large social gathering. Likely CAP Bacterial vs. Viral  - Tachypnea, Leukocytosis, elevated lactate, hypotensive on admission, +imaging meets sepsis criteria  - CT Chest:  Bilateral lung patchy airspace and groundglass opacities. Chronic underlying lung disease cannot be distinguished from multifocal bronchopneumonia or combination of both. Small dependent pleural effusions  - s/p Ceftriaxone, Azithro, 500cc IVF bolus in ED. Will continue   - f/u blood and urine Cx. f/u legionella, strept pneumonia.  RVP neg.   - Robitussin  - Hold IVF in setting of pleural eff and elevated BNP. Encourage PO intake   - ID (Dr. Dozier) consult

## 2024-06-03 NOTE — PROGRESS NOTE ADULT - PROBLEM SELECTOR PLAN 1
AHRF 2/2 PNA on admission. Resp failure likely multifactorial - initially 2/2 acute infection , pt also with underlying ILD  - completed course of antibiotics   - Increased O2 requirements on 5/31. CTA chest 6/01 negative for PE, increase b/l effusion  - TTE -> estimated at 55 to 60 %, mild pulmonary hypertension.  - s/p 1 unit of pRBC, Lasix was given 5/31 and held then due to low BP  - Daily diuresis as tolerates   - titrate off supplement O2 with goal O2 90-94%

## 2024-06-03 NOTE — DIETITIAN INITIAL EVALUATION ADULT - NSICDXPASTMEDICALHX_GEN_ALL_CORE_FT
PAST MEDICAL HISTORY:  BOOP (bronchiolitis obliterans with organizing pneumonia) 1991    Fibroids 1978    Granulomatous lung disease 1995 at [facility] had granuloma removed    ILD (interstitial lung disease)     Intestinal obstruction 1999    Lichen of skin vaginal area, 2012    Lymphadenopathy, inguinal right 1988, removed surgically    MDS (myelodysplastic syndrome) 2013, found incidentally    Mild dementia     Osteoarthritis     Osteoporosis     Pernicious anemia 2000    Pulmonary fibrosis 8/13    Sprain of right rotator cuff capsule, initial encounter

## 2024-06-03 NOTE — PROGRESS NOTE ADULT - SUBJECTIVE AND OBJECTIVE BOX
Patient is a 88y old  Female who presents with a chief complaint of PNA (02 Jun 2024 14:40)      Subjective:  INTERVAL HPI/OVERNIGHT EVENTS: Patient seen and examined at bedside. Pt is very tired appearing. She states he breathing is slightly better. She remains on venti mask but is sating 98% . Plan was to transition pt to nasal [place].  Upon reevaluation pt was eating and had venti mask off her face and was desaturating to the 70s. Placed back on oxygen.     MEDICATIONS  (STANDING):  acetaminophen     Tablet .. 1000 milliGRAM(s) Oral every 8 hours  albuterol/ipratropium for Nebulization 3 milliLiter(s) Nebulizer every 6 hours  cholecalciferol 1000 Unit(s) Oral daily  cyanocobalamin 1000 MICROGram(s) Oral daily  donepezil 10 milliGRAM(s) Oral at bedtime  furosemide   Injectable 10 milliGRAM(s) IV Push daily  guaiFENesin Oral Liquid (Sugar-Free) 200 milliGRAM(s) Oral every 6 hours  lidocaine   4% Patch 1 Patch Transdermal every 24 hours  memantine 10 milliGRAM(s) Oral two times a day  mirtazapine 15 milliGRAM(s) Oral at bedtime    MEDICATIONS  (PRN):  aluminum hydroxide/magnesium hydroxide/simethicone Suspension 30 milliLiter(s) Oral every 4 hours PRN Dyspepsia  melatonin 3 milliGRAM(s) Oral at bedtime PRN Insomnia  ondansetron Injectable 4 milliGRAM(s) IV Push every 8 hours PRN Nausea and/or Vomiting  traMADol 50 milliGRAM(s) Oral every 6 hours PRN Severe Pain (7 - 10)  traMADol 25 milliGRAM(s) Oral every 6 hours PRN Moderate Pain (4 - 6)      Allergies    sulfa drugs (Short breath)  adhesives (Hives)  PC Pen VK (Other (Mild to Mod))    Intolerances        REVIEW OF SYSTEMS:  CONSTITUTIONAL: No fever or chills  HEENT:  No headache, no sore throat  RESPIRATORY: No cough, wheezing, or shortness of breath  CARDIOVASCULAR: No chest pain, palpitations  GASTROINTESTINAL: No abd pain, nausea, vomiting, or diarrhea  GENITOURINARY: No dysuria, frequency, or hematuria  NEUROLOGICAL: no focal weakness or dizziness  MUSCULOSKELETAL: no myalgias     Objective:  Vital Signs Last 24 Hrs  T(C): 36.7 (03 Jun 2024 04:27), Max: 36.7 (03 Jun 2024 04:27)  T(F): 98 (03 Jun 2024 04:27), Max: 98 (03 Jun 2024 04:27)  HR: 76 (03 Jun 2024 08:09) (76 - 100)  BP: 107/63 (03 Jun 2024 04:27) (102/57 - 111/53)  BP(mean): --  RR: 19 (03 Jun 2024 04:27) (17 - 20)  SpO2: 96% (03 Jun 2024 08:09) (90% - 97%)    Parameters below as of 03 Jun 2024 08:09  Patient On (Oxygen Delivery Method): mask, Venturi, 50%    PHYSICAL EXAM:  GENERAL: somnolent with venti-mask O2   HEENT:  anicteric, moist mucous membranes  CHEST/LUNG:  bilateral diffuse rales, diminished b/l bases   HEART:  RRR, S1, S2  ABDOMEN:  BS+, soft, nontender, nondistended  EXTREMITIES: no edema, cyanosis, or calf tenderness  NERVOUS SYSTEM: answers few questions and follows commands appropriately      LABS:                        8.9    14.04 )-----------( 180      ( 03 Jun 2024 07:35 )             28.6     CBC Full  -  ( 03 Jun 2024 07:35 )  WBC Count : 14.04 K/uL  Hemoglobin : 8.9 g/dL  Hematocrit : 28.6 %  Platelet Count - Automated : 180 K/uL  Mean Cell Volume : 110.9 fl  Mean Cell Hemoglobin : 34.5 pg  Mean Cell Hemoglobin Concentration : 31.1 gm/dL  Auto Neutrophil # : x  Auto Lymphocyte # : x  Auto Monocyte # : x  Auto Eosinophil # : x  Auto Basophil # : x  Auto Neutrophil % : x  Auto Lymphocyte % : x  Auto Monocyte % : x  Auto Eosinophil % : x  Auto Basophil % : x    03 Jun 2024 07:35    141    |  109    |  39     ----------------------------<  104    4.4     |  28     |  0.97     Ca    8.7        03 Jun 2024 07:35  Phos  3.3       03 Jun 2024 07:35  Mg     2.7       03 Jun 2024 07:35    TPro  5.4    /  Alb  2.0    /  TBili  0.5    /  DBili  x      /  AST  37     /  ALT  35     /  AlkPhos  57     03 Jun 2024 07:35      Urinalysis Basic - ( 03 Jun 2024 07:35 )    Color: x / Appearance: x / SG: x / pH: x  Gluc: 104 mg/dL / Ketone: x  / Bili: x / Urobili: x   Blood: x / Protein: x / Nitrite: x   Leuk Esterase: x / RBC: x / WBC x   Sq Epi: x / Non Sq Epi: x / Bacteria: x      CAPILLARY BLOOD GLUCOSE              RADIOLOGY & ADDITIONAL TESTS:    Personally reviewed.     Consultant(s) Notes Reviewed:  [x] YES  [ ] NO

## 2024-06-03 NOTE — PROGRESS NOTE ADULT - PROBLEM SELECTOR PLAN 4
hgh 10.4 -> 8.4 -> 7.7 -> 9.6 s/p 1 unit pRBC  active type and screen 6/4  no hx of cad or heart disease, transfuse<7  b12 elevated, ferritin elevated, anemia of chronic disease

## 2024-06-03 NOTE — PROGRESS NOTE ADULT - PROBLEM SELECTOR PLAN 3
s/p unwitnessed fall at home alone this AM. Hematomas of R frontotemporal region and R shoulder. + rib fractures  - fall likely 2/2 acute infection + dehydration from decreased PO intake. Denies LOC though unclear  - CT Chest: acute R 6th and 7th rib fractures (see report)  - CT Head: Right anterolateral extra calvarial soft tissue swelling/hematoma. No hemorrhage  - Pain management: Ofirmev x4 doses,  PO tylenol, lidocaine patch  - Check orthostatics  - fall risk protocol

## 2024-06-03 NOTE — DIETITIAN INITIAL EVALUATION ADULT - OTHER INFO
Reason for Admission: PNA acute hypoxic respiratory failure  History of Present Illness:   87 y/o F with PMHx ILD (not on home O2), pulmonary fibrosis, lung granulomatosis disease (s/p granuloma resection 1995), intestinal obstruction, mild dysplastic syndrome, pernicious anemia, dementia, OA, osteoporosis presenting to the ED s/p unwitnessed fall at home and productive cough. Patient has dementia, majority of history obtained from HHA and daughter at bedside. Patient attended a graduation 8 days ago, and returned with a productive cough four days later. Cough has been worsening, and patient has had decreased PO intake and fatigue. Patient lives alone with a HHA for several hour a day  weight ~ 110# stable per aide  no food allergies  6/2 BM   MCV elevated B12 and folate WNL   patient seen on venti mask at time of visit

## 2024-06-03 NOTE — DIETITIAN INITIAL EVALUATION ADULT - ORAL INTAKE PTA/DIET HISTORY
patient seen with private aide at bedside. patient eating well this AM. food preferences noted. taking ensure supplement. takes supplement at home 2X day. likes vegetables, likes fish , but no tuna, likes chix salad and vanilla yogurt. patient apparently takes toast dry , oj, and coffee. eats small meals but has protein starch and vegetable. will provide food preferences with ices cream requested.   education deferred in this patient with mild dementia from home with aide

## 2024-06-03 NOTE — DIETITIAN INITIAL EVALUATION ADULT - PERTINENT MEDS FT
MEDICATIONS  (STANDING):  acetaminophen     Tablet .. 1000 milliGRAM(s) Oral every 8 hours  albuterol/ipratropium for Nebulization 3 milliLiter(s) Nebulizer every 6 hours  cholecalciferol 1000 Unit(s) Oral daily  cyanocobalamin 1000 MICROGram(s) Oral daily  donepezil 10 milliGRAM(s) Oral at bedtime  furosemide   Injectable 10 milliGRAM(s) IV Push daily  guaiFENesin Oral Liquid (Sugar-Free) 200 milliGRAM(s) Oral every 6 hours  lidocaine   4% Patch 1 Patch Transdermal every 24 hours  memantine 10 milliGRAM(s) Oral two times a day  mirtazapine 15 milliGRAM(s) Oral at bedtime    MEDICATIONS  (PRN):  aluminum hydroxide/magnesium hydroxide/simethicone Suspension 30 milliLiter(s) Oral every 4 hours PRN Dyspepsia  melatonin 3 milliGRAM(s) Oral at bedtime PRN Insomnia  ondansetron Injectable 4 milliGRAM(s) IV Push every 8 hours PRN Nausea and/or Vomiting

## 2024-06-03 NOTE — PROGRESS NOTE ADULT - SUBJECTIVE AND OBJECTIVE BOX
[facility]  INFECTIOUS DISEASES   [address] [place] [address]  Tel: [phone]     Fax: [phone]  ========================================================  MD Massimo Barakat Kaushal, MD Cho, Michelle, MD Sunjit, Jaspal, MD  ========================================================    [id number]  VALORIE PLATA     Follow up: Pneumonia     Awake and alert, On venti mask with good sat in high 90s.   No fever.     PAST MEDICAL & SURGICAL HISTORY:  Fibroids  1978  Lymphadenopathy, inguinal  right 1988, removed surgically  Granulomatous lung disease  1995 at [facility] had granuloma removed  Pulmonary fibrosis  8/13  Osteoarthritis  Osteoporosis  Pernicious anemia  2000  Lichen of skin  vaginal area, 2012  Intestinal obstruction  1999  MDS (myelodysplastic syndrome)  2013, found incidentally  BOOP (bronchiolitis obliterans with organizing pneumonia)  1991  ILD (interstitial lung disease)  Mild dementia  Sprain of right rotator cuff capsule, initial encounter  S/P tubal ligation  1976  S/P hysterectomy  Left ovary left in place, 1978  S/P hip replacement  right 2004  S/P hip replacement  left 2013  Elective surgery  (VAT and biopsy, 2014)    Social Hx: No current smoking, EtOH or drugs     FAMILY HISTORY:  Noncontributory     Allergies  sulfa drugs (Short breath)  adhesives (Hives)  PC Pen VK (Other (Mild to Mod))    MEDICATIONS  (STANDING):  acetaminophen   IVPB .. 750 milliGRAM(s) IV Intermittent every 6 hours  cefTRIAXone   IVPB 1000 milliGRAM(s) IV Intermittent every 24 hours  cholecalciferol 1000 Unit(s) Oral daily  cyanocobalamin 1000 MICROGram(s) Oral daily  donepezil 10 milliGRAM(s) Oral at bedtime  guaiFENesin Oral Liquid (Sugar-Free) 200 milliGRAM(s) Oral every 6 hours  lidocaine   4% Patch 1 Patch Transdermal every 24 hours  memantine 10 milliGRAM(s) Oral two times a day  mirtazapine 15 milliGRAM(s) Oral at bedtime    MEDICATIONS  (PRN):  aluminum hydroxide/magnesium hydroxide/simethicone Suspension 30 milliLiter(s) Oral every 4 hours PRN Dyspepsia  melatonin 3 milliGRAM(s) Oral at bedtime PRN Insomnia  ondansetron Injectable 4 milliGRAM(s) IV Push every 8 hours PRN Nausea and/or Vomiting  traMADol 50 milliGRAM(s) Oral every 6 hours PRN Severe Pain (7 - 10)  traMADol 25 milliGRAM(s) Oral every 6 hours PRN Moderate Pain (4 - 6)     REVIEW OF SYSTEMS:  CONSTITUTIONAL:  No Fever or chills  HEENT:  No diplopia or blurred vision.  No sore throat or runny nose.  CARDIOVASCULAR:  No chest pain   RESPIRATORY:  No cough, shortness of breath, PND or orthopnea.  GASTROINTESTINAL:  No nausea, vomiting or diarrhea.  GENITOURINARY:  No dysuria, frequency or urgency. No Blood in urine  MUSCULOSKELETAL:  no joint aches, no muscle pain  SKIN:  No change in skin, hair or nails.    Physical Exam:  Vital Signs Last 24 Hrs  T(C): 36.6 (03 Jun 2024 12:42), Max: 36.7 (03 Jun 2024 04:27)  T(F): 97.9 (03 Jun 2024 12:42), Max: 98.1 (03 Jun 2024 12:04)  HR: 113 (03 Jun 2024 12:42) (76 - 113)  BP: 145/87 (03 Jun 2024 12:42) (102/57 - 145/87)  BP(mean): --  RR: 25 (03 Jun 2024 12:42) (17 - 25)  SpO2: 97% (03 Jun 2024 12:04) (90% - 97%)  Parameters below as of 03 Jun 2024 12:42  Patient On (Oxygen Delivery Method): mask, Venturi  O2 Flow (L/min): 15  O2 Concentration (%): 50  GEN: NAD  HEENT: normocephalic and atraumatic. EOMI. PERRL.    NECK: Supple.  No lymphadenopathy   LUNGS: Crackles bilaterally throughout   HEART: Regular rate and rhythm  ABDOMEN: Soft, nontender, and nondistended.    : No CVA tenderness  EXTREMITIES: Without edema.  NEUROLOGIC: grossly intact.  PSYCHIATRIC: Awake and alert but not oriented   SKIN: No rash              Labs:                        8.9    14.04 )-----------( 180      ( 03 Jun 2024 07:35 )             28.6     06-03    141  |  109<H>  |  39<H>  ----------------------------<  104<H>  4.4   |  28  |  0.97    Ca    8.7      03 Jun 2024 07:35  Phos  3.3     06-03  Mg     2.7     06-03    TPro  5.4<L>  /  Alb  2.0<L>  /  TBili  0.5  /  DBili  x   /  AST  37  /  ALT  35  /  AlkPhos  57  06-03    Culture - Blood (collected 05-27-24 @ 09:35)  Source: .Blood Blood-Peripheral  Final Report (06-01-24 @ 16:00):    No growth at 5 days    Culture - Urine (collected 05-27-24 @ 09:30)  Source: Clean Catch Clean Catch (Midstream)  Final Report (05-28-24 @ 15:03):    >=3 organisms. Probable collection contamination.    Culture - Blood (collected 05-27-24 @ 09:30)  Source: .Blood Blood-Peripheral  Final Report (06-01-24 @ 16:01):    No growth at 5 days    WBC Count: 14.04 K/uL (06-03-24 @ 07:35)  WBC Count: 15.33 K/uL (06-02-24 @ 08:50)  WBC Count: 17.96 K/uL (06-01-24 @ 16:05)  WBC Count: 17.65 K/uL (06-01-24 @ 05:55)  WBC Count: 12.46 K/uL (05-31-24 @ 06:34)  WBC Count: 11.94 K/uL (05-30-24 @ 08:08)    Creatinine: 0.97 mg/dL (06-03-24 @ 07:35)  Creatinine: 1.00 mg/dL (06-02-24 @ 08:50)  Creatinine: 1.00 mg/dL (06-01-24 @ 05:55)  Creatinine: 1.10 mg/dL (05-31-24 @ 06:34)  Creatinine: 1.30 mg/dL (05-30-24 @ 08:08)    Ferritin: 643 ng/mL (05-29-24 @ 08:30)    Procalcitonin: 0.63 ng/mL (06-02-24 @ 08:50)  Procalcitonin: 0.32 ng/mL (05-28-24 @ 07:36)     SARS-CoV-2: NotDetec (05-27-24 @ 09:30)    All imaging and other data have been reviewed.  < from: CT Chest No Cont (05.27.24 @ 10:55) >  IMPRESSION:  1.  RIGHT lateral 7th rib fracture (309/141). Minimal anterior RIGHT   6th,7th rib deformities/possible additional nondisplaced fractures   (309-174, 191).  2.  Age indeterminant LEFT pubic fractures favors chronic etiology (area   degraded by motion unsharpness and orthopedic beam hardening artifact).  3.  Bilateral lung patchy airspace and groundglass opacities. Chronic   underlying lung disease cannot be distinguished from multifocal   bronchopneumonia or combination of both. Small dependent pleural effusions  4.  Prominent/normal size spleen- wandering spleen,  enlarged from prior   exams. Clinical correlation with regard to LUQ pain recommended    Assessment and Plan:   87 y/o woman with PMH of ILD/pulmonary fibrosis, lung granulomatosis disease (s/p granuloma resection 1995), myelodysplastic syndrome, pernicious anemia and dementia was admitted   after an un unwitnessed fall at home and productive cough. Daughter is at the bedside. No fever. Has some sough and SOB on o2 NC now.   Leukocytosis 18.45   CT chest with GGO in both sides with underlying fibrosis and possibly multifocal bronchopneumonia, also she had multiple rib fractures. UA with 15 WBC otherwise negative.     # Pneumonia?   # ILD  # UTI?  # Fall and rib fractures     - Blood cultures NGTD   - UC with >3 different colonies possibly contamination  - Urinary strep Ag is positive   - Legionella U ag is negative   - Completed ceftriaxone 1gm daily, for total of 5dyas on 5/31  - Nebulizer as per pulmonary  - ILD causing some of her symptoms and respiratory distress.     Will sign off. Please call with any question.     Sara Moya MD  Division of Infectious Diseases   Please call ID service at [phone] with any question.    50 minutes spent on total encounter assessing patient, examination, chart review, counseling and coordinating care by the attending physician/nurse/care manager.

## 2024-06-03 NOTE — DIETITIAN INITIAL EVALUATION ADULT - PROBLEM SELECTOR PLAN 4
CT A/P: Prominent/normal size spleen- wandering spleen,  enlarged from prior exams. Clinical correlation with regard to LUQ pain recommended  - Likely will req outpatient f/u  - Trend hgb and abdominal closely

## 2024-06-03 NOTE — PROGRESS NOTE ADULT - PROBLEM SELECTOR PLAN 2
Productive cough, weakness, decreased PO intake x 1week following large social gathering.   - Sepsis on admission. RESOLVED   - CT Chest:  Bilateral lung patchy airspace and groundglass opacities. Chronic underlying lung disease cannot be distinguished from multifocal bronchopneumonia or combination of both. Small dependent pleural effusions  - strep ag +  - rvp negative and legionella negative.   - s/p course of Rocephin  - WBC elevated s/p completion of Rocephin, no fevers. Will monitor off abx, likely reactive with stress response.   - Duoneb, Robitussin  - negative blood and urine Cx.   - ID (Dr. Dozier) consult

## 2024-06-03 NOTE — DIETITIAN INITIAL EVALUATION ADULT - PERTINENT LABORATORY DATA
06-03    141  |  109<H>  |  39<H>  ----------------------------<  104<H>  4.4   |  28  |  0.97    Ca    8.7      03 Jun 2024 07:35  Phos  3.3     06-03  Mg     2.7     06-03    TPro  5.4<L>  /  Alb  2.0<L>  /  TBili  0.5  /  DBili  x   /  AST  37  /  ALT  35  /  AlkPhos  57  06-03

## 2024-06-04 NOTE — CONSULT NOTE ADULT - ASSESSMENT
89 y/o F with PMHx ILD (not on home O2), pulmonary fibrosis, lung granulomatosis disease (s/p granuloma resection 1995), intestinal obstruction, mild dysplastic syndrome, pernicious anemia, dementia, OA, osteoporosis presenting to the ED s/p unwitnessed fall at home and productive cough. Patient with exacerbation of ILD with rip fractures and fulminant respiratory failure. Palliative consulted for goc

## 2024-06-04 NOTE — CONSULT NOTE ADULT - SUBJECTIVE AND OBJECTIVE BOX
HPI:  89 y/o F with PMHx ILD (not on home O2), pulmonary fibrosis, lung granulomatosis disease (s/p granuloma resection 1995), intestinal obstruction, mild dysplastic syndrome, pernicious anemia, dementia, OA, osteoporosis presenting to the ED s/p unwitnessed fall at home and productive cough. Patient has dementia, majority of history obtained from HHA and daughter at bedside. Patient attended a graduation 8 days ago, and returned with a productive cough four days later. Cough has been worsening, and patient has had decreased PO intake and fatigue. Patient lives alone with a HHA for several hour a day. Around 3am this morning, patient was found on the ground via cameras the family had setup and requested the HHA assist her. Pt unsure of events leading to fall. Denies LOC. Given her state, the aid called EMS. Currently, patient reports cough and mid-sternal chest pain worse with coughing and movement. Denies headache, blurry vision, SOB, abd pain.      ED course    Vitals: T 97.9, HR 87, BP 85/52 --> 101/50, RR 16, SpO2 99% RA  Labs:  WBC 18, Hgb 10.4, lactate 2.1 --> 1.3, pro-BNP 5349  UA: cloudy, mod LE, +blood, 15 WBC, few bacteria   EKG: NSR at 86 bpm, PVCs, ST abnormality in leads II and III    Given: Ceftriaxone, Azithromycin, Duoneb     CT Head:  No acute intracranial hemorrhage, brain edema, or mass effect.  No displaced calvarial fracture.  Right anterolateral extra calvarial soft tissue swelling/hematoma.    CT Neck:   No acute fracture or traumatic subluxation.  No prevertebral soft tissue swelling.  Degenerative changes.    CT Chest, Abd, Pelvis:   1.  RIGHT lateral 7th rib fracture (309/141). Minimal anterior RIGHT   6th,7th rib deformities/possible additional nondisplaced fractures   (309-174, 191).  2.  Age indeterminant LEFT pubic fractures favors chronic etiology (area   degraded by motion unsharpness and orthopedic beam hardening artifact).  3.  Bilateral lung patchy airspace and groundglass opacities. Chronic   underlying lung disease cannot be distinguished from multifocal   bronchopneumonia or combination of both. Small dependent pleural effusions  4.  Prominent/normal size spleen- wandering spleen,  enlarged from prior   exams. Clinical correlation with regard to LUQ pain recommended    XR Humerus, Right: no fracture. Reversed right shoulder replacement  XR Knee, Right: no fracture, no effusion (27 May 2024 13:26)    PERTINENT PM/SXH:   Fibroids    Lymphadenopathy, inguinal    Granulomatous lung disease    Pulmonary fibrosis    Osteoarthritis    Osteoporosis    Pernicious anemia    Lichen of skin    Intestinal obstruction    MDS (myelodysplastic syndrome)    BOOP (bronchiolitis obliterans with organizing pneumonia)    ILD (interstitial lung disease)    Mild dementia    Sprain of right rotator cuff capsule, initial encounter      S/P tubal ligation    S/P hysterectomy    S/P hip replacement    S/P hip replacement    Elective surgery      FAMILY HISTORY:    Family Hx substance abuse [ ]yes [ ]no  ITEMS NOT CHECKED ARE NOT PRESENT    SOCIAL HISTORY:   Significant other/partner[ ]  Children[ ]  Roman Catholic/Spirituality:  Substance hx:  [ ]   Tobacco hx:  [ ]   Alcohol hx: [ ]   Home Opioid hx:  [ ] I-Stop Reference No:  Living Situation: [ ]Home  [ ]Long term care  [ ]Rehab [ ]Other    ADVANCE DIRECTIVES:    DNR/MOLST  [ ]  Living Will  [ ]   DECISION MAKER(s):  [ ] Health Care Proxy(s)  [ ] Surrogate(s)  [ ] Guardian           Name(s): Phone Number(s):    BASELINE (I)ADL(s) (prior to admission):  [place]: [ ]Total  [ ] Moderate [ ]Dependent    Allergies    sulfa drugs (Short breath)  adhesives (Hives)  PC Pen VK (Other (Mild to Mod))    Intolerances    MEDICATIONS  (STANDING):  acetaminophen     Tablet .. 1000 milliGRAM(s) Oral every 8 hours  albuterol/ipratropium for Nebulization 3 milliLiter(s) Nebulizer every 6 hours  cholecalciferol 1000 Unit(s) Oral daily  cyanocobalamin 1000 MICROGram(s) Oral daily  donepezil 10 milliGRAM(s) Oral at bedtime  furosemide   Injectable 10 milliGRAM(s) IV Push daily  guaiFENesin Oral Liquid (Sugar-Free) 200 milliGRAM(s) Oral every 6 hours  lidocaine   4% Patch 1 Patch Transdermal every 24 hours  memantine 10 milliGRAM(s) Oral two times a day  methylPREDNISolone sodium succinate Injectable 60 milliGRAM(s) IV Push daily  mirtazapine 15 milliGRAM(s) Oral at bedtime  sodium chloride 3%  Inhalation 4 milliLiter(s) Inhalation every 12 hours    MEDICATIONS  (PRN):  aluminum hydroxide/magnesium hydroxide/simethicone Suspension 30 milliLiter(s) Oral every 4 hours PRN Dyspepsia  melatonin 3 milliGRAM(s) Oral at bedtime PRN Insomnia  ondansetron Injectable 4 milliGRAM(s) IV Push every 8 hours PRN Nausea and/or Vomiting    PRESENT SYMPTOMS: [ ]Unable to self-report  [ ] CPOT [ ] PAINADs [ ] RDOS  Source if other than patient:  [ ]Family   [ ]Team     Pain: [ ]yes [ ]no  QOL impact -   Location -                    Aggravating factors -  Quality -  Radiation -  Timing-  Severity (0-10 scale):  Minimal acceptable level (0-10 scale):     CPOT:    https://[email or web address]/getattachment/[email or web address]/Critical-Care-Pain-Observation-Tool-(CPOT)    PAIN AD Score:   http://[email or web address]/cog/painad.pdf (press ctrl +  left click to view)    Dyspnea:                           [ ]Mild [ ]Moderate [ ]Severe      RDOS:  0 to 2  minimal or no respiratory distress   3  mild distress  4 to 6 moderate distress  >7 severe distress  https://homecareinformation.net/handouts/hen/Respiratory_Distress_Observation_Scale.pdf (Ctrl +  left click to view)     Anxiety:                             [ ]Mild [ ]Moderate [ ]Severe  Fatigue:                             [ ]Mild [ ]Moderate [ ]Severe  Nausea:                             [ ]Mild [ ]Moderate [ ]Severe  Loss of appetite:              [ ]Mild [ ]Moderate [ ]Severe  Constipation:                    [ ]Mild [ ]Moderate [ ]Severe    PCSSQ[Palliative Care Spiritual Screening Question]   Severity (0-10):  Score of 4 or > indicate consideration of Chaplaincy referral.  Chaplaincy Referral: [ ] yes [ ] refused [ ] following [ ] Deferred     Caregiver [place]? : [ ] yes [ ] no [ ] Deferred [ ] Declined             Social work referral [ ] Patient & Family Centered Care Referral [ ]     Anticipatory Grief present?:  [ ] yes [ ] no  [ ] Deferred                  Social work referral [ ] Chaplaincy Referral[ ]      Other Symptoms:  [ ]All other review of systems negative     Palliative Performance Status Version 2:         %    http://npcrc.org/files/news/palliative_performance_scale_ppsv2.pdf  PHYSICAL EXAM:  Vital Signs Last 24 Hrs  T(C): 36.6 (04 Jun 2024 11:15), Max: 36.7 (04 Jun 2024 04:20)  T(F): 97.8 (04 Jun 2024 11:15), Max: 98 (04 Jun 2024 04:20)  HR: 98 (04 Jun 2024 11:15) (77 - 112)  BP: 131/40 (04 Jun 2024 11:15) (124/64 - 131/50)  BP(mean): --  RR: 20 (04 Jun 2024 11:15) (17 - 24)  SpO2: 97% (04 Jun 2024 11:15) (69% - 98%)    Parameters below as of 04 Jun 2024 11:15  Patient On (Oxygen Delivery Method): mask, nonrebreather, 100     I&O's Summary    03 Jun 2024 07:01  -  04 Jun 2024 07:00  --------------------------------------------------------  IN: 0 mL / OUT: 300 mL / NET: -300 mL      GENERAL: [ ]Cachexia    [ ]Alert  [ ]Oriented x   [ ]Lethargic  [ ]Unarousable  [ ]Verbal  [ ]Non-Verbal  Behavioral:   [ ] Anxiety  [ ] Delirium [ ] Agitation [ ] Other  HEENT:  [ ]Normal   [ ]Dry mouth   [ ]ET Tube/Trach  [ ]Oral lesions  PULMONARY:   [ ]Clear [ ]Tachypnea  [ ]Audible excessive secretions   [ ]Rhonchi        [ ]Right [ ]Left [ ]Bilateral  [ ]Crackles        [ ]Right [ ]Left [ ]Bilateral  [ ]Wheezing     [ ]Right [ ]Left [ ]Bilateral  [ ]Diminished breath sounds [ ]right [ ]left [ ]bilateral  CARDIOVASCULAR:    [ ]Regular [ ]Irregular [ ]Tachy  [ ]Isael [ ]Murmur [ ]Other  GASTROINTESTINAL:  [ ]Soft  [ ]Distended   [ ]+BS  [ ]Non tender [ ]Tender  [ ]Other [ ]PEG [ ]OGT/ NGT  Last BM:  GENITOURINARY:  [ ]Normal [ ] Incontinent   [ ]Oliguria/Anuria   [ ]Sams  MUSCULOSKELETAL:   [ ]Normal   [ ]Weakness  [ ]Bed/Wheelchair bound [ ]Edema  NEUROLOGIC:   [ ]No focal deficits  [ ]Cognitive impairment  [ ]Dysphagia [ ]Dysarthria [ ]Paresis [ ]Other   SKIN:   [ ]Normal  [ ]Rash  [ ]Other  [ ]Pressure ulcer(s)       Present on admission [ ]y [ ]n    CRITICAL CARE:  [ ] Shock Present  [ ]Septic [ ]Cardiogenic [ ]Neurologic [ ]Hypovolemic  [ ]  Vasopressors [ ]  Inotropes   [ ]Respiratory failure present [ ]Mechanical ventilation [ ]Non-invasive ventilatory support [ ]High flow    [ ]Acute  [ ]Chronic [ ]Hypoxic  [ ]Hypercarbic [ ]Other  [ ]Other organ failure     LABS:                        10.4   22.39 )-----------( 247      ( 04 Jun 2024 05:10 )             33.5   06-04    142  |  107  |  37<H>  ----------------------------<  118<H>  4.2   |  30  |  0.97    Ca    9.1      04 Jun 2024 05:10  Phos  3.3     06-03  Mg     2.7     06-03    TPro  6.7  /  Alb  2.3<L>  /  TBili  0.7  /  DBili  x   /  AST  29  /  ALT  35  /  AlkPhos  64  06-04      Urinalysis Basic - ( 04 Jun 2024 05:10 )    Color: x / Appearance: x / SG: x / pH: x  Gluc: 118 mg/dL / Ketone: x  / Bili: x / Urobili: x   Blood: x / Protein: x / Nitrite: x   Leuk Esterase: x / RBC: x / WBC x   Sq Epi: x / Non Sq Epi: x / Bacteria: x      RADIOLOGY & ADDITIONAL STUDIES:    PROTEIN CALORIE MALNUTRITION PRESENT: [ ]mild [ ]moderate [ ]severe [ ]underweight [ ]morbid obesity  https://www.andeal.org/vault/1490/web/files/ONC/Table_Clinical%20Characteristics%20to%20Document%20Malnutrition-White%20JV%20et%20al%202012.pdf    Height (cm): 149.9 (05-27-24 @ 09:10)  Weight (kg): 49.9 (05-27-24 @ 09:10)  BMI (kg/m2): 22.2 (05-27-24 @ 09:10)    [ ]PPSV2 < or = to 30% [ ]significant weight loss  [ ]poor nutritional intake  [ ]anasarca[ ]Artificial Nutrition      Other REFERRALS:  [ ]Hospice  [ ]Child Life  [ ]Social Work  [ ]Case management [ ]Holistic Therapy

## 2024-06-04 NOTE — CONSULT NOTE ADULT - CONVERSATION DETAILS
Discussed with family on this date- patient in fulminant respiratory failure with escalating doses of oxygen from ventimask to high flow. Seen by ICU with impending need for intubation.    Reviewed with HCP's on this date Michael- reviewed options for ICU level of care and intubation vs. transition in focus of care to comfort directed approach, managing distressing symptoms such as pain, sob, anxiety, etc. discussed likelihood of this happening in setting of known ILD, and exacerbation, with rib fractures on right side. Despite escalating therapies, steroids, etc patient is getting worse. Family states patient wouldn't want to be resuscitated or intubated and had recently states how she hopes to see her  soon (who has ). Discussed what a comfort directed approach would look like including use of opiates to manage work of breathing. unintended side effects reviewed with family as well as potential life expectancy of hours to days if breathing continues on this trajectory. briefly mentioned potential option for inpatient hospice pending clinical course and symptoms. emotional support provided.

## 2024-06-04 NOTE — CONSULT NOTE ADULT - PROBLEM SELECTOR RECOMMENDATION 4
DNR/DNI established with CMO      IV Morphine 2mg q2h prn for dyspnea- goal RR <24  IV Morphine 2mg q2h prn for severe pain  IV Morphine 1mg q2h prn for moderate pain  IV ativan 0.5mg q2h prn for anxiety, agitation, refractory dyspnea  IV glycopyrrolate 0.2mg q6h prn for secretions  dulcolax [place] PRN constipation, daily

## 2024-06-04 NOTE — CONSULT NOTE ADULT - SUBJECTIVE AND OBJECTIVE BOX
Date of entry of this note is equal to date of services rendered    Significant recent/past 24 hr events:  HPI per below.  Patient admitted for hypoxia 2/2 ILD flare 2/2 MF PNA 2/2 strep now s/p 5 day course of rocephin.  Patient with progressively worsening oxygen requirements and increased WOB.  Had been on VM 50% with frequent desaturations.  ICU consulted for further management.  Patient seen and examined at bedside.  Alert but confused, able to defend airway.  Accessory muscle use noted.  Bilevel ordered, but patient unable to tolerate it and took mask off herself.  Palliative care consulted and actively attempting to establish who decision makers are to determine next course of action.      Subjective:    Review of Systems         [ x] A ten-point review of systems was otherwise negative except as noted.  [ ] Due to altered mental status/intubation, subjective information were not able to be obtained from the patient. History was obtained, to the extent possible, from review of the chart and collateral sources of information.      Patient is a 88y old  Female who presents with a chief complaint of PNA (04 Jun 2024 11:59)    HPI:  89 y/o F with PMHx ILD (not on home O2), pulmonary fibrosis, lung granulomatosis disease (s/p granuloma resection 1995), intestinal obstruction, mild dysplastic syndrome, pernicious anemia, dementia, OA, osteoporosis presenting to the ED s/p unwitnessed fall at home and productive cough. Patient has dementia, majority of history obtained from HHA and daughter at bedside. Patient attended a graduation 8 days ago, and returned with a productive cough four days later. Cough has been worsening, and patient has had decreased PO intake and fatigue. Patient lives alone with a HHA for several hour a day. Around 3am this morning, patient was found on the ground via cameras the family had setup and requested the HHA assist her. Pt unsure of events leading to fall. Denies LOC. Given her state, the aid called EMS. Currently, patient reports cough and mid-sternal chest pain worse with coughing and movement. Denies headache, blurry vision, SOB, abd pain.      ED course    Vitals: T 97.9, HR 87, BP 85/52 --> 101/50, RR 16, SpO2 99% RA  Labs:  WBC 18, Hgb 10.4, lactate 2.1 --> 1.3, pro-BNP 5349  UA: cloudy, mod LE, +blood, 15 WBC, few bacteria   EKG: NSR at 86 bpm, PVCs, ST abnormality in leads II and III    Given: Ceftriaxone, Azithromycin, Duoneb     CT Head:  No acute intracranial hemorrhage, brain edema, or mass effect.  No displaced calvarial fracture.  Right anterolateral extra calvarial soft tissue swelling/hematoma.    CT Neck:   No acute fracture or traumatic subluxation.  No prevertebral soft tissue swelling.  Degenerative changes.    CT Chest, Abd, Pelvis:   1.  RIGHT lateral 7th rib fracture (309/141). Minimal anterior RIGHT   6th,7th rib deformities/possible additional nondisplaced fractures   (309-174, 191).  2.  Age indeterminant LEFT pubic fractures favors chronic etiology (area   degraded by motion unsharpness and orthopedic beam hardening artifact).  3.  Bilateral lung patchy airspace and groundglass opacities. Chronic   underlying lung disease cannot be distinguished from multifocal   bronchopneumonia or combination of both. Small dependent pleural effusions  4.  Prominent/normal size spleen- wandering spleen,  enlarged from prior   exams. Clinical correlation with regard to LUQ pain recommended    XR Humerus, Right: no fracture. Reversed right shoulder replacement  XR Knee, Right: no fracture, no effusion (27 May 2024 13:26)    PAST MEDICAL & SURGICAL HISTORY:  Fibroids  1978      Lymphadenopathy, inguinal  right 1988, removed surgically      Granulomatous lung disease  1995 at [facility] had granuloma removed      Pulmonary fibrosis  8/13      Osteoarthritis      Osteoporosis      Pernicious anemia  2000      Lichen of skin  vaginal area, 2012      Intestinal obstruction  1999      MDS (myelodysplastic syndrome)  2013, found incidentally      BOOP (bronchiolitis obliterans with organizing pneumonia)  1991      ILD (interstitial lung disease)      Mild dementia      Sprain of right rotator cuff capsule, initial encounter      S/P tubal ligation  1976      S/P hysterectomy  Left ovary left in place, 1978      S/P hip replacement  right 2004      S/P hip replacement  left 2013      Elective surgery  (VAT and biopsy, 2014)        FAMILY HISTORY:      Vitals   ICU Vital Signs Last 24 Hrs  T(C): 36.6 (04 Jun 2024 11:15), Max: 36.7 (04 Jun 2024 04:20)  T(F): 97.8 (04 Jun 2024 11:15), Max: 98 (04 Jun 2024 04:20)  HR: 98 (04 Jun 2024 11:15) (77 - 113)  BP: 131/40 (04 Jun 2024 11:15) (124/64 - 145/87)  BP(mean): --  ABP: --  ABP(mean): --  RR: 20 (04 Jun 2024 11:15) (17 - 25)  SpO2: 97% (04 Jun 2024 11:15) (69% - 98%)    O2 Parameters below as of 04 Jun 2024 11:15  Patient On (Oxygen Delivery Method): mask, nonrebreather, 100            Physical Exam:   Constitutional: NAD, well-groomed, well-developed  HEENT: PERRLA, EOMI, no drainage or redness  Neck: supple,  No JVD, Trachea midline  Back: Normal spine flexure, No CVA tenderness, No deformity or limitation of movement  Respiratory: Breath Sounds equal & clear bilaterally to auscultation, no accessory muscle use noted  Cardiovascular: Regular rate, regular rhythm, normal S1, S2; no murmurs or rub  Gastrointestinal: Soft, non-tender, non distended, no hepatosplenomegaly, normal bowel sounds  Extremities: UREÑA x 4, no peripheral edema, no cyanosis, no clubbing   Vascular: Equal and normal pulses: 2+ peripheral pulses throughout  Neurological: A+O x 3; speech clear and intact; no sensory, motor  deficits, normal reflexes  Psychiatric: calm, normal mood, normal affect  Musculoskeletal: No joint swelling or deformity; no limitation of movement  Skin: warm, dry, well perfused, no rashes    VENT SETTINGS         I&O's Detail    03 Jun 2024 07:01  -  04 Jun 2024 07:00  --------------------------------------------------------  IN:  Total IN: 0 mL    OUT:    Voided (mL): 300 mL  Total OUT: 300 mL    Total NET: -300 mL          LABS                        10.4   22.39 )-----------( 247      ( 04 Jun 2024 05:10 )             33.5     06-04    142  |  107  |  37<H>  ----------------------------<  118<H>  4.2   |  30  |  0.97    Ca    9.1      04 Jun 2024 05:10  Phos  3.3     06-03  Mg     2.7     06-03    TPro  6.7  /  Alb  2.3<L>  /  TBili  0.7  /  DBili  x   /  AST  29  /  ALT  35  /  AlkPhos  64  06-04    LIVER FUNCTIONS - ( 04 Jun 2024 05:10 )  Alb: 2.3 g/dL / Pro: 6.7 g/dL / ALK PHOS: 64 U/L / ALT: 35 U/L / AST: 29 U/L / GGT: x                   Urinalysis Basic - ( 04 Jun 2024 05:10 )    Color: x / Appearance: x / SG: x / pH: x  Gluc: 118 mg/dL / Ketone: x  / Bili: x / Urobili: x   Blood: x / Protein: x / Nitrite: x   Leuk Esterase: x / RBC: x / WBC x   Sq Epi: x / Non Sq Epi: x / Bacteria: x            MEDICATIONS  (STANDING):  acetaminophen     Tablet .. 1000 milliGRAM(s) Oral every 8 hours  albuterol/ipratropium for Nebulization 3 milliLiter(s) Nebulizer every 6 hours  cholecalciferol 1000 Unit(s) Oral daily  cyanocobalamin 1000 MICROGram(s) Oral daily  donepezil 10 milliGRAM(s) Oral at bedtime  furosemide   Injectable 10 milliGRAM(s) IV Push daily  guaiFENesin Oral Liquid (Sugar-Free) 200 milliGRAM(s) Oral every 6 hours  lidocaine   4% Patch 1 Patch Transdermal every 24 hours  memantine 10 milliGRAM(s) Oral two times a day  methylPREDNISolone sodium succinate Injectable 60 milliGRAM(s) IV Push daily  mirtazapine 15 milliGRAM(s) Oral at bedtime  sodium chloride 3%  Inhalation 4 milliLiter(s) Inhalation every 12 hours    MEDICATIONS  (PRN):  aluminum hydroxide/magnesium hydroxide/simethicone Suspension 30 milliLiter(s) Oral every 4 hours PRN Dyspepsia  melatonin 3 milliGRAM(s) Oral at bedtime PRN Insomnia  ondansetron Injectable 4 milliGRAM(s) IV Push every 8 hours PRN Nausea and/or Vomiting      Allergies:  sulfa drugs (Short breath)  adhesives (Hives)  PC Pen VK (Other (Mild to Mod))        CRITICAL CARE TIME SPENT:  minutes of critical care time spent providing medical care for patient's acute illness/conditions that impairs at least one vital organ system and/or poses a high risk of imminent or life threatening deterioration in the patient's condition. It includes time spent evaluating and treating the patient's acute illness as well as time spent reviewing labs, radiology, discussing goals of care with patient and/or patient's family, and discussing the case with a multidisciplinary team, in an effort to prevent further life threatening deterioration or end organ damage. This time is independent of any procedures performed.       Date of entry of this note is equal to date of services rendered    Significant recent/past 24 hr events:  HPI per below.  Patient admitted for hypoxia 2/2 ILD flare 2/2 MF PNA 2/2 strep now s/p 5 day course of rocephin.  Patient with progressively worsening oxygen requirements and increased WOB.  Had been on VM 50% with frequent desaturations.  ICU consulted for further management.  Patient seen and examined at bedside.  Alert but confused, able to defend airway.  Tachypneic with accessory muscle use noted.  Bilevel ordered, but patient unable to tolerate it and took mask off herself.  Palliative care consulted and actively attempting to establish who decision makers are to determine next course of action.      Subjective:    Review of Systems         [ x] A ten-point review of systems was otherwise negative except as noted.  [ ] Due to altered mental status/intubation, subjective information were not able to be obtained from the patient. History was obtained, to the extent possible, from review of the chart and collateral sources of information.      Patient is a 88y old  Female who presents with a chief complaint of PNA (04 Jun 2024 11:59)    HPI:  89 y/o F with PMHx ILD (not on home O2), pulmonary fibrosis, lung granulomatosis disease (s/p granuloma resection 1995), intestinal obstruction, mild dysplastic syndrome, pernicious anemia, dementia, OA, osteoporosis presenting to the ED s/p unwitnessed fall at home and productive cough. Patient has dementia, majority of history obtained from HHA and daughter at bedside. Patient attended a graduation 8 days ago, and returned with a productive cough four days later. Cough has been worsening, and patient has had decreased PO intake and fatigue. Patient lives alone with a HHA for several hour a day. Around 3am this morning, patient was found on the ground via cameras the family had setup and requested the HHA assist her. Pt unsure of events leading to fall. Denies LOC. Given her state, the aid called EMS. Currently, patient reports cough and mid-sternal chest pain worse with coughing and movement. Denies headache, blurry vision, SOB, abd pain.      ED course    Vitals: T 97.9, HR 87, BP 85/52 --> 101/50, RR 16, SpO2 99% RA  Labs:  WBC 18, Hgb 10.4, lactate 2.1 --> 1.3, pro-BNP 5349  UA: cloudy, mod LE, +blood, 15 WBC, few bacteria   EKG: NSR at 86 bpm, PVCs, ST abnormality in leads II and III    Given: Ceftriaxone, Azithromycin, Duoneb     CT Head:  No acute intracranial hemorrhage, brain edema, or mass effect.  No displaced calvarial fracture.  Right anterolateral extra calvarial soft tissue swelling/hematoma.    CT Neck:   No acute fracture or traumatic subluxation.  No prevertebral soft tissue swelling.  Degenerative changes.    CT Chest, Abd, Pelvis:   1.  RIGHT lateral 7th rib fracture (309/141). Minimal anterior RIGHT   6th,7th rib deformities/possible additional nondisplaced fractures   (309-174, 191).  2.  Age indeterminant LEFT pubic fractures favors chronic etiology (area   degraded by motion unsharpness and orthopedic beam hardening artifact).  3.  Bilateral lung patchy airspace and groundglass opacities. Chronic   underlying lung disease cannot be distinguished from multifocal   bronchopneumonia or combination of both. Small dependent pleural effusions  4.  Prominent/normal size spleen- wandering spleen,  enlarged from prior   exams. Clinical correlation with regard to LUQ pain recommended    XR Humerus, Right: no fracture. Reversed right shoulder replacement  XR Knee, Right: no fracture, no effusion (27 May 2024 13:26)    PAST MEDICAL & SURGICAL HISTORY:  Fibroids  1978      Lymphadenopathy, inguinal  right 1988, removed surgically      Granulomatous lung disease  1995 at [facility] had granuloma removed      Pulmonary fibrosis  8/13      Osteoarthritis      Osteoporosis      Pernicious anemia  2000      Lichen of skin  vaginal area, 2012      Intestinal obstruction  1999      MDS (myelodysplastic syndrome)  2013, found incidentally      BOOP (bronchiolitis obliterans with organizing pneumonia)  1991      ILD (interstitial lung disease)      Mild dementia      Sprain of right rotator cuff capsule, initial encounter      S/P tubal ligation  1976      S/P hysterectomy  Left ovary left in place, 1978      S/P hip replacement  right 2004      S/P hip replacement  left 2013      Elective surgery  (VAT and biopsy, 2014)        FAMILY HISTORY:      Vitals   ICU Vital Signs Last 24 Hrs  T(C): 36.6 (04 Jun 2024 11:15), Max: 36.7 (04 Jun 2024 04:20)  T(F): 97.8 (04 Jun 2024 11:15), Max: 98 (04 Jun 2024 04:20)  HR: 98 (04 Jun 2024 11:15) (77 - 113)  BP: 131/40 (04 Jun 2024 11:15) (124/64 - 145/87)  BP(mean): --  ABP: --  ABP(mean): --  RR: 20 (04 Jun 2024 11:15) (17 - 25)  SpO2: 97% (04 Jun 2024 11:15) (69% - 98%)    O2 Parameters below as of 04 Jun 2024 11:15  Patient On (Oxygen Delivery Method): mask, nonrebreather, 100            Physical Exam:   Constitutional: NAD, well-groomed, well-developed  HEENT: PERRLA, EOMI, no drainage or redness  Neck: supple,  No JVD, Trachea midline  Back: Normal spine flexure, No CVA tenderness, No deformity or limitation of movement  Respiratory: Breath Sounds equal & clear bilaterally to auscultation, no accessory muscle use noted  Cardiovascular: Regular rate, regular rhythm, normal S1, S2; no murmurs or rub  Gastrointestinal: Soft, non-tender, non distended, no hepatosplenomegaly, normal bowel sounds  Extremities: UREÑA x 4, no peripheral edema, no cyanosis, no clubbing   Vascular: Equal and normal pulses: 2+ peripheral pulses throughout  Neurological: A+O x 3; speech clear and intact; no sensory, motor  deficits, normal reflexes  Psychiatric: calm, normal mood, normal affect  Musculoskeletal: No joint swelling or deformity; no limitation of movement  Skin: warm, dry, well perfused, no rashes    VENT SETTINGS         I&O's Detail    03 Jun 2024 07:01  -  04 Jun 2024 07:00  --------------------------------------------------------  IN:  Total IN: 0 mL    OUT:    Voided (mL): 300 mL  Total OUT: 300 mL    Total NET: -300 mL          LABS                        10.4   22.39 )-----------( 247      ( 04 Jun 2024 05:10 )             33.5     06-04    142  |  107  |  37<H>  ----------------------------<  118<H>  4.2   |  30  |  0.97    Ca    9.1      04 Jun 2024 05:10  Phos  3.3     06-03  Mg     2.7     06-03    TPro  6.7  /  Alb  2.3<L>  /  TBili  0.7  /  DBili  x   /  AST  29  /  ALT  35  /  AlkPhos  64  06-04    LIVER FUNCTIONS - ( 04 Jun 2024 05:10 )  Alb: 2.3 g/dL / Pro: 6.7 g/dL / ALK PHOS: 64 U/L / ALT: 35 U/L / AST: 29 U/L / GGT: x                   Urinalysis Basic - ( 04 Jun 2024 05:10 )    Color: x / Appearance: x / SG: x / pH: x  Gluc: 118 mg/dL / Ketone: x  / Bili: x / Urobili: x   Blood: x / Protein: x / Nitrite: x   Leuk Esterase: x / RBC: x / WBC x   Sq Epi: x / Non Sq Epi: x / Bacteria: x            MEDICATIONS  (STANDING):  acetaminophen     Tablet .. 1000 milliGRAM(s) Oral every 8 hours  albuterol/ipratropium for Nebulization 3 milliLiter(s) Nebulizer every 6 hours  cholecalciferol 1000 Unit(s) Oral daily  cyanocobalamin 1000 MICROGram(s) Oral daily  donepezil 10 milliGRAM(s) Oral at bedtime  furosemide   Injectable 10 milliGRAM(s) IV Push daily  guaiFENesin Oral Liquid (Sugar-Free) 200 milliGRAM(s) Oral every 6 hours  lidocaine   4% Patch 1 Patch Transdermal every 24 hours  memantine 10 milliGRAM(s) Oral two times a day  methylPREDNISolone sodium succinate Injectable 60 milliGRAM(s) IV Push daily  mirtazapine 15 milliGRAM(s) Oral at bedtime  sodium chloride 3%  Inhalation 4 milliLiter(s) Inhalation every 12 hours    MEDICATIONS  (PRN):  aluminum hydroxide/magnesium hydroxide/simethicone Suspension 30 milliLiter(s) Oral every 4 hours PRN Dyspepsia  melatonin 3 milliGRAM(s) Oral at bedtime PRN Insomnia  ondansetron Injectable 4 milliGRAM(s) IV Push every 8 hours PRN Nausea and/or Vomiting      Allergies:  sulfa drugs (Short breath)  adhesives (Hives)  PC Pen VK (Other (Mild to Mod))        CRITICAL CARE TIME SPENT:  minutes of critical care time spent providing medical care for patient's acute illness/conditions that impairs at least one vital organ system and/or poses a high risk of imminent or life threatening deterioration in the patient's condition. It includes time spent evaluating and treating the patient's acute illness as well as time spent reviewing labs, radiology, discussing goals of care with patient and/or patient's family, and discussing the case with a multidisciplinary team, in an effort to prevent further life threatening deterioration or end organ damage. This time is independent of any procedures performed.       Date of entry of this note is equal to date of services rendered    Significant recent/past 24 hr events:  HPI per below.  Patient admitted for hypoxia 2/2 ILD flare 2/2 MF PNA 2/2 strep now s/p 5 day course of rocephin.  Patient with progressively worsening oxygen requirements and increased WOB.  Had been on VM 50% with frequent desaturations.  ICU consulted for further management.  Patient seen and examined at bedside.  Alert but confused, able to defend airway.  Tachypneic with accessory muscle use noted.  Bilevel ordered, but patient unable to tolerate it and took mask off herself.  Palliative care consulted and actively attempting to establish who decision makers are to determine next course of action.      Subjective:    Review of Systems         [ x] A ten-point review of systems was otherwise negative except as noted.  [ ] Due to altered mental status/intubation, subjective information were not able to be obtained from the patient. History was obtained, to the extent possible, from review of the chart and collateral sources of information.      Patient is a 88y old  Female who presents with a chief complaint of PNA (04 Jun 2024 11:59)    HPI:  89 y/o F with PMHx ILD (not on home O2), pulmonary fibrosis, lung granulomatosis disease (s/p granuloma resection 1995), intestinal obstruction, mild dysplastic syndrome, pernicious anemia, dementia, OA, osteoporosis presenting to the ED s/p unwitnessed fall at home and productive cough. Patient has dementia, majority of history obtained from HHA and daughter at bedside. Patient attended a graduation 8 days ago, and returned with a productive cough four days later. Cough has been worsening, and patient has had decreased PO intake and fatigue. Patient lives alone with a HHA for several hour a day. Around 3am this morning, patient was found on the ground via cameras the family had setup and requested the HHA assist her. Pt unsure of events leading to fall. Denies LOC. Given her state, the aid called EMS. Currently, patient reports cough and mid-sternal chest pain worse with coughing and movement. Denies headache, blurry vision, SOB, abd pain.      ED course    Vitals: T 97.9, HR 87, BP 85/52 --> 101/50, RR 16, SpO2 99% RA  Labs:  WBC 18, Hgb 10.4, lactate 2.1 --> 1.3, pro-BNP 5349  UA: cloudy, mod LE, +blood, 15 WBC, few bacteria   EKG: NSR at 86 bpm, PVCs, ST abnormality in leads II and III    Given: Ceftriaxone, Azithromycin, Duoneb     CT Head:  No acute intracranial hemorrhage, brain edema, or mass effect.  No displaced calvarial fracture.  Right anterolateral extra calvarial soft tissue swelling/hematoma.    CT Neck:   No acute fracture or traumatic subluxation.  No prevertebral soft tissue swelling.  Degenerative changes.    CT Chest, Abd, Pelvis:   1.  RIGHT lateral 7th rib fracture (309/141). Minimal anterior RIGHT   6th,7th rib deformities/possible additional nondisplaced fractures   (309-174, 191).  2.  Age indeterminant LEFT pubic fractures favors chronic etiology (area   degraded by motion unsharpness and orthopedic beam hardening artifact).  3.  Bilateral lung patchy airspace and groundglass opacities. Chronic   underlying lung disease cannot be distinguished from multifocal   bronchopneumonia or combination of both. Small dependent pleural effusions  4.  Prominent/normal size spleen- wandering spleen,  enlarged from prior   exams. Clinical correlation with regard to LUQ pain recommended    XR Humerus, Right: no fracture. Reversed right shoulder replacement  XR Knee, Right: no fracture, no effusion (27 May 2024 13:26)    PAST MEDICAL & SURGICAL HISTORY:  Fibroids  1978      Lymphadenopathy, inguinal  right 1988, removed surgically      Granulomatous lung disease  1995 at [facility] had granuloma removed      Pulmonary fibrosis  8/13      Osteoarthritis      Osteoporosis      Pernicious anemia  2000      Lichen of skin  vaginal area, 2012      Intestinal obstruction  1999      MDS (myelodysplastic syndrome)  2013, found incidentally      BOOP (bronchiolitis obliterans with organizing pneumonia)  1991      ILD (interstitial lung disease)      Mild dementia      Sprain of right rotator cuff capsule, initial encounter      S/P tubal ligation  1976      S/P hysterectomy  Left ovary left in place, 1978      S/P hip replacement  right 2004      S/P hip replacement  left 2013      Elective surgery  (VAT and biopsy, 2014)        FAMILY HISTORY:      Vitals   ICU Vital Signs Last 24 Hrs  T(C): 36.6 (04 Jun 2024 11:15), Max: 36.7 (04 Jun 2024 04:20)  T(F): 97.8 (04 Jun 2024 11:15), Max: 98 (04 Jun 2024 04:20)  HR: 98 (04 Jun 2024 11:15) (77 - 113)  BP: 131/40 (04 Jun 2024 11:15) (124/64 - 145/87)  BP(mean): --  ABP: --  ABP(mean): --  RR: 20 (04 Jun 2024 11:15) (17 - 25)  SpO2: 97% (04 Jun 2024 11:15) (69% - 98%)    O2 Parameters below as of 04 Jun 2024 11:15  Patient On (Oxygen Delivery Method): mask, nonrebreather, 100            Physical Exam:   Constitutional: Moderate respiratory distress  HEENT: PERRLA, EOMI, no drainage or redness  Neck:  No JVD, Trachea midline  Respiratory: Breath Sounds dimnished bilaterally to auscultation, mod accessory muscle use noted  Cardiovascular: Regular rate, regular rhythm, normal S1, S2; no murmurs or rub  Gastrointestinal: Soft, non-tender, non distended, no hepatosplenomegaly, normal bowel sounds  Extremities: UREÑA x 4, no peripheral edema, no cyanosis, no clubbing   Vascular: Equal and normal pulses: 2+ peripheral pulses throughout  Neurological: A+O x 1; speech clear and intact; no sensory, motor  deficits, normal reflexes  Psychiatric: calm  Skin: warm, dry, well perfused, no rashes    VENT SETTINGS         I&O's Detail    03 Jun 2024 07:01  -  04 Jun 2024 07:00  --------------------------------------------------------  IN:  Total IN: 0 mL    OUT:    Voided (mL): 300 mL  Total OUT: 300 mL    Total NET: -300 mL          LABS                        10.4   22.39 )-----------( 247      ( 04 Jun 2024 05:10 )             33.5     06-04    142  |  107  |  37<H>  ----------------------------<  118<H>  4.2   |  30  |  0.97    Ca    9.1      04 Jun 2024 05:10  Phos  3.3     06-03  Mg     2.7     06-03    TPro  6.7  /  Alb  2.3<L>  /  TBili  0.7  /  DBili  x   /  AST  29  /  ALT  35  /  AlkPhos  64  06-04    LIVER FUNCTIONS - ( 04 Jun 2024 05:10 )  Alb: 2.3 g/dL / Pro: 6.7 g/dL / ALK PHOS: 64 U/L / ALT: 35 U/L / AST: 29 U/L / GGT: x                   Urinalysis Basic - ( 04 Jun 2024 05:10 )    Color: x / Appearance: x / SG: x / pH: x  Gluc: 118 mg/dL / Ketone: x  / Bili: x / Urobili: x   Blood: x / Protein: x / Nitrite: x   Leuk Esterase: x / RBC: x / WBC x   Sq Epi: x / Non Sq Epi: x / Bacteria: x            MEDICATIONS  (STANDING):  acetaminophen     Tablet .. 1000 milliGRAM(s) Oral every 8 hours  albuterol/ipratropium for Nebulization 3 milliLiter(s) Nebulizer every 6 hours  cholecalciferol 1000 Unit(s) Oral daily  cyanocobalamin 1000 MICROGram(s) Oral daily  donepezil 10 milliGRAM(s) Oral at bedtime  furosemide   Injectable 10 milliGRAM(s) IV Push daily  guaiFENesin Oral Liquid (Sugar-Free) 200 milliGRAM(s) Oral every 6 hours  lidocaine   4% Patch 1 Patch Transdermal every 24 hours  memantine 10 milliGRAM(s) Oral two times a day  methylPREDNISolone sodium succinate Injectable 60 milliGRAM(s) IV Push daily  mirtazapine 15 milliGRAM(s) Oral at bedtime  sodium chloride 3%  Inhalation 4 milliLiter(s) Inhalation every 12 hours    MEDICATIONS  (PRN):  aluminum hydroxide/magnesium hydroxide/simethicone Suspension 30 milliLiter(s) Oral every 4 hours PRN Dyspepsia  melatonin 3 milliGRAM(s) Oral at bedtime PRN Insomnia  ondansetron Injectable 4 milliGRAM(s) IV Push every 8 hours PRN Nausea and/or Vomiting      Allergies:  sulfa drugs (Short breath)  adhesives (Hives)  PC Pen VK (Other (Mild to Mod))        CRITICAL CARE TIME SPENT:  minutes of critical care time spent providing medical care for patient's acute illness/conditions that impairs at least one vital organ system and/or poses a high risk of imminent or life threatening deterioration in the patient's condition. It includes time spent evaluating and treating the patient's acute illness as well as time spent reviewing labs, radiology, discussing goals of care with patient and/or patient's family, and discussing the case with a multidisciplinary team, in an effort to prevent further life threatening deterioration or end organ damage. This time is independent of any procedures performed.

## 2024-06-04 NOTE — PROGRESS NOTE ADULT - PROBLEM SELECTOR PLAN 5
CT A/P: Prominent/normal size spleen- wandering spleen,  enlarged from prior exams. Clinical correlation with regard to LUQ pain recommended  - Trend hgb closely  - + weakness and enlarged spleen  - EBV positive for reactive disease

## 2024-06-04 NOTE — PROGRESS NOTE ADULT - PROBLEM SELECTOR PLAN 3
s/p unwitnessed fall at home alone. Hematomas of R frontotemporal region and R shoulder. + rib fractures  - fall likely 2/2 acute infection + dehydration from decreased PO intake. Denies LOC though unclear  - CT Chest: acute R 6th and 7th rib fractures (see report)  - CT Head: Right anterolateral extra calvarial soft tissue swelling/hematoma. No hemorrhage  - Pain management: Ofirmev x4 doses,  PO tylenol, lidocaine patch  - Check orthostatics  - fall risk protocol

## 2024-06-04 NOTE — PROGRESS NOTE ADULT - SUBJECTIVE AND OBJECTIVE BOX
Patient is a 88y old  Female who presents with a chief complaint of PNA (04 Jun 2024 11:08)    INTERVAL HPI/OVERNIGHT EVENTS: Pt with chest pain overnight which was reproducible, troponin negative. Pt also trialed on NC however had desat to 69% and was placed back on venti mask. Pt currently reports feeling well, reports chest pain has mostly resolved. She denies specific complaints at this time.     MEDICATIONS  (STANDING):  acetaminophen     Tablet .. 1000 milliGRAM(s) Oral every 8 hours  albuterol/ipratropium for Nebulization 3 milliLiter(s) Nebulizer every 6 hours  cholecalciferol 1000 Unit(s) Oral daily  cyanocobalamin 1000 MICROGram(s) Oral daily  donepezil 10 milliGRAM(s) Oral at bedtime  furosemide   Injectable 10 milliGRAM(s) IV Push daily  guaiFENesin Oral Liquid (Sugar-Free) 200 milliGRAM(s) Oral every 6 hours  lidocaine   4% Patch 1 Patch Transdermal every 24 hours  memantine 10 milliGRAM(s) Oral two times a day  mirtazapine 15 milliGRAM(s) Oral at bedtime  predniSONE   Tablet 40 milliGRAM(s) Oral daily  sodium chloride 3%  Inhalation 4 milliLiter(s) Inhalation every 12 hours    MEDICATIONS  (PRN):  aluminum hydroxide/magnesium hydroxide/simethicone Suspension 30 milliLiter(s) Oral every 4 hours PRN Dyspepsia  melatonin 3 milliGRAM(s) Oral at bedtime PRN Insomnia  ondansetron Injectable 4 milliGRAM(s) IV Push every 8 hours PRN Nausea and/or Vomiting      Allergies    sulfa drugs (Short breath)  adhesives (Hives)  PC Pen VK (Other (Mild to Mod))    Intolerances        REVIEW OF SYSTEMS:  CONSTITUTIONAL: No fever or chills  HEENT:  No headache, no sore throat  RESPIRATORY: No cough, wheezing, or shortness of breath  CARDIOVASCULAR: No current chest pain, palpitations  GASTROINTESTINAL: No abd pain, nausea, vomiting, or diarrhea  GENITOURINARY: No dysuria, frequency, or hematuria  NEUROLOGICAL: no focal weakness or dizziness  MUSCULOSKELETAL: no myalgias     Vital Signs Last 24 Hrs  T(C): 36.6 (04 Jun 2024 11:15), Max: 36.7 (03 Jun 2024 12:04)  T(F): 97.8 (04 Jun 2024 11:15), Max: 98.1 (03 Jun 2024 12:04)  HR: 98 (04 Jun 2024 11:15) (77 - 113)  BP: 131/40 (04 Jun 2024 11:15) (124/64 - 145/87)  BP(mean): --  RR: 20 (04 Jun 2024 11:15) (17 - 25)  SpO2: 97% (04 Jun 2024 11:15) (69% - 98%)    Parameters below as of 04 Jun 2024 11:15  Patient On (Oxygen Delivery Method): mask, nonrebreather, 100        PHYSICAL EXAM:  GENERAL: NAD, venti mask in place  HEENT:  anicteric, EOMI  CHEST/LUNG:  diminished breath sounds b/l, no wheezing appreciated on auscultation  HEART:  RRR, S1, S2  ABDOMEN:  BS+, soft, nontender, nondistended  EXTREMITIES: no edema, cyanosis, or calf tenderness  NERVOUS SYSTEM: answers questions and follows commands appropriately    LABS:                        10.4   22.39 )-----------( 247      ( 04 Jun 2024 05:10 )             33.5     CBC Full  -  ( 04 Jun 2024 05:10 )  WBC Count : 22.39 K/uL  Hemoglobin : 10.4 g/dL  Hematocrit : 33.5 %  Platelet Count - Automated : 247 K/uL  Mean Cell Volume : 111.7 fl  Mean Cell Hemoglobin : 34.7 pg  Mean Cell Hemoglobin Concentration : 31.0 gm/dL  Auto Neutrophil # : 20.37 K/uL  Auto Lymphocyte # : 0.22 K/uL  Auto Monocyte # : 1.34 K/uL  Auto Eosinophil # : 0.00 K/uL  Auto Basophil # : 0.00 K/uL  Auto Neutrophil % : 86.0 %  Auto Lymphocyte % : 1.0 %  Auto Monocyte % : 6.0 %  Auto Eosinophil % : 0.0 %  Auto Basophil % : 0.0 %    04 Jun 2024 05:10    142    |  107    |  37     ----------------------------<  118    4.2     |  30     |  0.97     Ca    9.1        04 Jun 2024 05:10    TPro  6.7    /  Alb  2.3    /  TBili  0.7    /  DBili  x      /  AST  29     /  ALT  35     /  AlkPhos  64     04 Jun 2024 05:10      Urinalysis Basic - ( 04 Jun 2024 05:10 )    Color: x / Appearance: x / SG: x / pH: x  Gluc: 118 mg/dL / Ketone: x  / Bili: x / Urobili: x   Blood: x / Protein: x / Nitrite: x   Leuk Esterase: x / RBC: x / WBC x   Sq Epi: x / Non Sq Epi: x / Bacteria: x      CAPILLARY BLOOD GLUCOSE              RADIOLOGY & ADDITIONAL TESTS:  Personally reviewed.     Consultant(s) Notes Reviewed:  [x] YES  [ ] NO Patient is a 88y old  Female who presents with a chief complaint of PNA (04 Jun 2024 11:08)    INTERVAL HPI/OVERNIGHT EVENTS: Pt with chest pain overnight which was reproducible, troponin negative. Pt also trialed on NC however had desat to 69% and was placed back on venti mask. Pt currently reports feeling well, reports chest pain has mostly resolved. She denies specific complaints at this time.     MEDICATIONS  (STANDING):  acetaminophen     Tablet .. 1000 milliGRAM(s) Oral every 8 hours  albuterol/ipratropium for Nebulization 3 milliLiter(s) Nebulizer every 6 hours  cholecalciferol 1000 Unit(s) Oral daily  cyanocobalamin 1000 MICROGram(s) Oral daily  donepezil 10 milliGRAM(s) Oral at bedtime  furosemide   Injectable 10 milliGRAM(s) IV Push daily  guaiFENesin Oral Liquid (Sugar-Free) 200 milliGRAM(s) Oral every 6 hours  lidocaine   4% Patch 1 Patch Transdermal every 24 hours  memantine 10 milliGRAM(s) Oral two times a day  mirtazapine 15 milliGRAM(s) Oral at bedtime  predniSONE   Tablet 40 milliGRAM(s) Oral daily  sodium chloride 3%  Inhalation 4 milliLiter(s) Inhalation every 12 hours    MEDICATIONS  (PRN):  aluminum hydroxide/magnesium hydroxide/simethicone Suspension 30 milliLiter(s) Oral every 4 hours PRN Dyspepsia  melatonin 3 milliGRAM(s) Oral at bedtime PRN Insomnia  ondansetron Injectable 4 milliGRAM(s) IV Push every 8 hours PRN Nausea and/or Vomiting      Allergies    sulfa drugs (Short breath)  adhesives (Hives)  PC Pen VK (Other (Mild to Mod))    Intolerances        REVIEW OF SYSTEMS:  CONSTITUTIONAL: No fever or chills  HEENT:  No headache, no sore throat  RESPIRATORY: No cough, wheezing, or shortness of breath  CARDIOVASCULAR: No current chest pain, palpitations  GASTROINTESTINAL: No abd pain, nausea, vomiting, or diarrhea  GENITOURINARY: No dysuria, frequency, or hematuria  NEUROLOGICAL: no focal weakness or dizziness  MUSCULOSKELETAL: no myalgias     Vital Signs Last 24 Hrs  T(C): 36.6 (04 Jun 2024 11:15), Max: 36.7 (03 Jun 2024 12:04)  T(F): 97.8 (04 Jun 2024 11:15), Max: 98.1 (03 Jun 2024 12:04)  HR: 98 (04 Jun 2024 11:15) (77 - 113)  BP: 131/40 (04 Jun 2024 11:15) (124/64 - 145/87)  BP(mean): --  RR: 20 (04 Jun 2024 11:15) (17 - 25)  SpO2: 97% (04 Jun 2024 11:15) (69% - 98%)    Parameters below as of 04 Jun 2024 11:15  Patient On (Oxygen Delivery Method): mask, nonrebreather, 100        PHYSICAL EXAM:  GENERAL: +tired appearing, frail, venti mask in place  HEENT:  anicteric, EOMI  CHEST/LUNG:  diminished breath sounds b/l, scattered rhonchi, no wheezing   HEART:  RRR, S1, S2  ABDOMEN:  BS+, soft, nontender, nondistended  EXTREMITIES: no edema, cyanosis, or calf tenderness  NERVOUS SYSTEM: answers questions and follows commands appropriately    LABS:                        10.4   22.39 )-----------( 247      ( 04 Jun 2024 05:10 )             33.5     CBC Full  -  ( 04 Jun 2024 05:10 )  WBC Count : 22.39 K/uL  Hemoglobin : 10.4 g/dL  Hematocrit : 33.5 %  Platelet Count - Automated : 247 K/uL  Mean Cell Volume : 111.7 fl  Mean Cell Hemoglobin : 34.7 pg  Mean Cell Hemoglobin Concentration : 31.0 gm/dL  Auto Neutrophil # : 20.37 K/uL  Auto Lymphocyte # : 0.22 K/uL  Auto Monocyte # : 1.34 K/uL  Auto Eosinophil # : 0.00 K/uL  Auto Basophil # : 0.00 K/uL  Auto Neutrophil % : 86.0 %  Auto Lymphocyte % : 1.0 %  Auto Monocyte % : 6.0 %  Auto Eosinophil % : 0.0 %  Auto Basophil % : 0.0 %    04 Jun 2024 05:10    142    |  107    |  37     ----------------------------<  118    4.2     |  30     |  0.97     Ca    9.1        04 Jun 2024 05:10    TPro  6.7    /  Alb  2.3    /  TBili  0.7    /  DBili  x      /  AST  29     /  ALT  35     /  AlkPhos  64     04 Jun 2024 05:10      Urinalysis Basic - ( 04 Jun 2024 05:10 )    Color: x / Appearance: x / SG: x / pH: x  Gluc: 118 mg/dL / Ketone: x  / Bili: x / Urobili: x   Blood: x / Protein: x / Nitrite: x   Leuk Esterase: x / RBC: x / WBC x   Sq Epi: x / Non Sq Epi: x / Bacteria: x      CAPILLARY BLOOD GLUCOSE              RADIOLOGY & ADDITIONAL TESTS:  Personally reviewed.     Consultant(s) Notes Reviewed:  [x] YES  [ ] NO

## 2024-06-04 NOTE — CONSULT NOTE ADULT - PROBLEM SELECTOR RECOMMENDATION 2
patient failing high flow, impending respiratory failure with need for intubation  ICU contacted  family has elected to forego intubation and prioritize comfort  start morphine IV 2mg q4h ATC  start IV morphine 2mg q2h prn for dyspnea  start IV ativan 0.5mg q2h prn for refractory dyspnea  start IV glycopyrrolate 0.2mg q6h prn for secretions

## 2024-06-04 NOTE — PROGRESS NOTE ADULT - SUBJECTIVE AND OBJECTIVE BOX
[facility]  INFECTIOUS DISEASES   [address] [place] [address]  Tel: [phone]     Fax: [phone]  ========================================================  MD Massimo Barakat Kaushal, MD Cho, Michelle, MD Sunjit, Jaspal, MD  ========================================================    [id number]  VALORIE BONI     Follow up: Pneumonia     Awake and alert, On venti mask with good sat but mild respiratory distress.   No fever. HHA at the bedside.     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  Pulmonary fibrosis  8/13  Osteoarthritis  Osteoporosis  Pernicious anemia  2000  Lichen of skin  vaginal area, 2012  Intestinal obstruction  1999  MDS (myelodysplastic syndrome)  2013, found incidentally  BOOP (bronchiolitis obliterans with organizing pneumonia)  1991  ILD (interstitial lung disease)  Mild dementia  Sprain of right rotator cuff capsule, initial encounter  S/P tubal ligation  1976  S/P hysterectomy  Left ovary left in place, 1978  S/P hip replacement  right 2004  S/P hip replacement  left 2013  Elective surgery  (VAT and biopsy, 2014)    Social Hx: No current smoking, EtOH or drugs     FAMILY HISTORY:  Noncontributory     Allergies  sulfa drugs (Short breath)  adhesives (Hives)  PC Pen VK (Other (Mild to Mod))    MEDICATIONS  (STANDING):  acetaminophen     Tablet .. 1000 milliGRAM(s) Oral every 8 hours  albuterol/ipratropium for Nebulization 3 milliLiter(s) Nebulizer every 6 hours  cholecalciferol 1000 Unit(s) Oral daily  cyanocobalamin 1000 MICROGram(s) Oral daily  donepezil 10 milliGRAM(s) Oral at bedtime  furosemide   Injectable 10 milliGRAM(s) IV Push daily  guaiFENesin Oral Liquid (Sugar-Free) 200 milliGRAM(s) Oral every 6 hours  lidocaine   4% Patch 1 Patch Transdermal every 24 hours  memantine 10 milliGRAM(s) Oral two times a day  mirtazapine 15 milliGRAM(s) Oral at bedtime  predniSONE   Tablet 40 milliGRAM(s) Oral daily  sodium chloride 3%  Inhalation 4 milliLiter(s) Inhalation every 12 hours     REVIEW OF SYSTEMS:  CONSTITUTIONAL:  No Fever or chills  HEENT:  No diplopia or blurred vision.  No sore throat or runny nose.  CARDIOVASCULAR:  No chest pain   RESPIRATORY:  No cough, +shortness of breath  GASTROINTESTINAL:  No nausea, vomiting or diarrhea.  GENITOURINARY:  No dysuria, frequency or urgency. No Blood in urine  MUSCULOSKELETAL:  no joint aches, no muscle pain  SKIN:  No change in skin, hair or nails.    Physical Exam:  Vital Signs Last 24 Hrs  T(C): 36.7 (04 Jun 2024 04:20), Max: 36.7 (03 Jun 2024 12:04)  T(F): 98 (04 Jun 2024 04:20), Max: 98.1 (03 Jun 2024 12:04)  HR: 80 (04 Jun 2024 07:30) (77 - 113)  BP: 124/64 (04 Jun 2024 04:20) (124/64 - 145/87)  BP(mean): --  RR: 17 (04 Jun 2024 04:20) (17 - 25)  SpO2: 92% (04 Jun 2024 07:30) (69% - 98%)  Parameters below as of 04 Jun 2024 07:30  Patient On (Oxygen Delivery Method): mask, Venturi, 50%  GEN: NAD  HEENT: normocephalic and atraumatic. EOMI. PERRL.    NECK: Supple.  No lymphadenopathy   LUNGS: Crackles bilaterally throughout   HEART: Regular rate and rhythm  ABDOMEN: Soft, nontender, and nondistended.    : No CVA tenderness  EXTREMITIES: Without edema.  NEUROLOGIC: grossly intact.  PSYCHIATRIC: Awake and alert but not oriented   SKIN: No rash     Labs:                        10.4   22.39 )-----------( 247      ( 04 Jun 2024 05:10 )             33.5     06-04    142  |  107  |  37<H>  ----------------------------<  118<H>  4.2   |  30  |  0.97    Ca    9.1      04 Jun 2024 05:10  Phos  3.3     06-03  Mg     2.7     06-03    TPro  6.7  /  Alb  2.3<L>  /  TBili  0.7  /  DBili  x   /  AST  29  /  ALT  35  /  AlkPhos  64  06-04    Culture - Blood (collected 05-27-24 @ 09:35)  Source: .Blood Blood-Peripheral  Final Report (06-01-24 @ 16:00):    No growth at 5 days    Culture - Urine (collected 05-27-24 @ 09:30)  Source: Clean Catch Clean Catch (Midstream)  Final Report (05-28-24 @ 15:03):    >=3 organisms. Probable collection contamination.    Culture - Blood (collected 05-27-24 @ 09:30)  Source: .Blood Blood-Peripheral  Final Report (06-01-24 @ 16:01):    No growth at 5 days    WBC Count: 22.39 K/uL (06-04-24 @ 05:10)  WBC Count: 14.04 K/uL (06-03-24 @ 07:35)  WBC Count: 15.33 K/uL (06-02-24 @ 08:50)  WBC Count: 17.96 K/uL (06-01-24 @ 16:05)  WBC Count: 17.65 K/uL (06-01-24 @ 05:55)  WBC Count: 12.46 K/uL (05-31-24 @ 06:34)    Creatinine: 0.97 mg/dL (06-04-24 @ 05:10)  Creatinine: 0.97 mg/dL (06-03-24 @ 07:35)  Creatinine: 1.00 mg/dL (06-02-24 @ 08:50)  Creatinine: 1.00 mg/dL (06-01-24 @ 05:55)  Creatinine: 1.10 mg/dL (05-31-24 @ 06:34)    Ferritin: 643 ng/mL (05-29-24 @ 08:30)    Procalcitonin: 0.63 ng/mL (06-02-24 @ 08:50)  Procalcitonin: 0.32 ng/mL (05-28-24 @ 07:36)     SARS-CoV-2: NotDetec (05-27-24 @ 09:30)      All imaging and other data have been reviewed.  < from: CT Chest No Cont (05.27.24 @ 10:55) >  IMPRESSION:  1.  RIGHT lateral 7th rib fracture (309/141). Minimal anterior RIGHT   6th,7th rib deformities/possible additional nondisplaced fractures   (309-174, 191).  2.  Age indeterminant LEFT pubic fractures favors chronic etiology (area   degraded by motion unsharpness and orthopedic beam hardening artifact).  3.  Bilateral lung patchy airspace and groundglass opacities. Chronic   underlying lung disease cannot be distinguished from multifocal   bronchopneumonia or combination of both. Small dependent pleural effusions  4.  Prominent/normal size spleen- wandering spleen,  enlarged from prior   exams. Clinical correlation with regard to LUQ pain recommended    Assessment and Plan:   87 y/o woman with PMH of ILD/pulmonary fibrosis, lung granulomatosis disease (s/p granuloma resection 1995), myelodysplastic syndrome, pernicious anemia and dementia was admitted   after an un unwitnessed fall at home and productive cough. Daughter is at the bedside. No fever. Has some sough and SOB on o2 NC now.   Leukocytosis 18.45   CT chest with GGO in both sides with underlying fibrosis and possibly multifocal bronchopneumonia, also she had multiple rib fractures. UA with 15 WBC otherwise negative.     She is still short of breath on venti mask, Leukocytosis is getting worse, today 22. Unclear if has any complications such as loculated effusion or empyema. No fever.     # Pneumonia?   # ILD  # UTI?  # Fall and rib fractures     - Blood cultures negative on 5/27  - Will repeat blood cultures today due to worsening leukocytosis  - UC with >3 different colonies possibly contamination  - Urinary strep Ag is positive but Legionella U ag is negative   - Completed ceftriaxone 1gm daily, for total of 5dyas on 5/31  - ILD causing some of her symptoms and respiratory distress?   - Will do Chest CT to rule out any complications related to strep pneumonia     Will follow.   D/W Dr. Garcia.     Sara Moya MD  Division of Infectious Diseases   Please call ID service at [phone] with any question.    50 minutes spent on total encounter assessing patient, examination, chart review, counseling and coordinating care by the attending physician/nurse/care manager.

## 2024-06-04 NOTE — CONSULT NOTE ADULT - PROBLEM SELECTOR RECOMMENDATION 5
will continue to follow  discussed with Dr. Radha Bloom MD, [facility]; Palliative Care Attending, Ethicist. [phone]

## 2024-06-04 NOTE — CHART NOTE - NSCHARTNOTEFT_GEN_A_CORE
RN called stating patient c/o midsternal sharp chest pain.  Pt seen and examined at bedside.  Reports onset of midsternal sharp chest pain a couple of hours ago.  Chest pain is reproducible upon palpation of the chest wall -- likely related to pt's rib fractures  Vitals: HR 90, /64  EKG ordered STAT -- no acute changes from previous aside from T wave inversion in V3?  Troponin ordered STAT  Will give pain meds for chest wall tenderness  RN to call if changes.

## 2024-06-04 NOTE — CONSULT NOTE ADULT - SUBJECTIVE AND OBJECTIVE BOX
Date/Time Patient Seen:  		  Referring MD:   Data Reviewed	       Patient is a 88y old  Female who presents with a chief complaint of PNA (04 Jun 2024 11:08)      Subjective/HPI   87 y/o F with PMHx ILD (not on home O2), pulmonary fibrosis, lung granulomatosis disease (s/p granuloma resection 1995), intestinal obstruction, mild dysplastic syndrome, pernicious anemia, dementia, OA, osteoporosis presenting to the ED s/p unwitnessed fall at home and productive cough. Patient has dementia, majority of history obtained from HHA and daughter at bedside. Patient attended a graduation 8 days ago, and returned with a productive cough four days later. Cough has been worsening, and patient has had decreased PO intake and fatigue. Patient lives alone with a HHA for several hour a day. Around 3am this morning, patient was found on the ground via cameras the family had setup and requested the HHA assist her. Pt unsure of events leading to fall. Denies LOC. Given her state, the aid called EMS. Currently, patient reports cough and mid-sternal chest pain worse with coughing and movement. Denies headache, blurry vision, SOB, abd pain.      PAST MEDICAL & SURGICAL HISTORY:  Fibroids  1978    Lymphadenopathy, inguinal  right 1988, removed surgically    Granulomatous lung disease  1995 at [facility] had granuloma removed    Pulmonary fibrosis  8/13    Osteoarthritis    Osteoporosis    Pernicious anemia  2000    Lichen of skin  vaginal area, 2012    Intestinal obstruction  1999    MDS (myelodysplastic syndrome)  2013, found incidentally    BOOP (bronchiolitis obliterans with organizing pneumonia)  1991    ILD (interstitial lung disease)    Mild dementia    Sprain of right rotator cuff capsule, initial encounter    S/P tubal ligation  1976    S/P hysterectomy  Left ovary left in place, 1978    S/P hip replacement  right 2004    S/P hip replacement  left 2013    Elective surgery  (VAT and biopsy, 2014)          Medication list         MEDICATIONS  (STANDING):  acetaminophen     Tablet .. 1000 milliGRAM(s) Oral every 8 hours  albuterol/ipratropium for Nebulization 3 milliLiter(s) Nebulizer every 6 hours  cholecalciferol 1000 Unit(s) Oral daily  cyanocobalamin 1000 MICROGram(s) Oral daily  donepezil 10 milliGRAM(s) Oral at bedtime  furosemide   Injectable 10 milliGRAM(s) IV Push daily  guaiFENesin Oral Liquid (Sugar-Free) 200 milliGRAM(s) Oral every 6 hours  lidocaine   4% Patch 1 Patch Transdermal every 24 hours  memantine 10 milliGRAM(s) Oral two times a day  mirtazapine 15 milliGRAM(s) Oral at bedtime  predniSONE   Tablet 40 milliGRAM(s) Oral daily  sodium chloride 3%  Inhalation 4 milliLiter(s) Inhalation every 12 hours    MEDICATIONS  (PRN):  aluminum hydroxide/magnesium hydroxide/simethicone Suspension 30 milliLiter(s) Oral every 4 hours PRN Dyspepsia  melatonin 3 milliGRAM(s) Oral at bedtime PRN Insomnia  ondansetron Injectable 4 milliGRAM(s) IV Push every 8 hours PRN Nausea and/or Vomiting         Vitals log        ICU Vital Signs Last 24 Hrs  T(C): 36.6 (04 Jun 2024 11:15), Max: 36.7 (03 Jun 2024 12:04)  T(F): 97.8 (04 Jun 2024 11:15), Max: 98.1 (03 Jun 2024 12:04)  HR: 98 (04 Jun 2024 11:15) (77 - 113)  BP: 131/40 (04 Jun 2024 11:15) (124/64 - 145/87)  BP(mean): --  ABP: --  ABP(mean): --  RR: 20 (04 Jun 2024 11:15) (17 - 25)  SpO2: 97% (04 Jun 2024 11:15) (69% - 98%)    O2 Parameters below as of 04 Jun 2024 11:15  Patient On (Oxygen Delivery Method): mask, nonrebreather, 100                 Input and Output:  I&O's Detail    03 Jun 2024 07:01  -  04 Jun 2024 07:00  --------------------------------------------------------  IN:  Total IN: 0 mL    OUT:    Voided (mL): 300 mL  Total OUT: 300 mL    Total NET: -300 mL          Lab Data                        10.4   22.39 )-----------( 247      ( 04 Jun 2024 05:10 )             33.5     06-04    142  |  107  |  37<H>  ----------------------------<  118<H>  4.2   |  30  |  0.97    Ca    9.1      04 Jun 2024 05:10  Phos  3.3     06-03  Mg     2.7     06-03    TPro  6.7  /  Alb  2.3<L>  /  TBili  0.7  /  DBili  x   /  AST  29  /  ALT  35  /  AlkPhos  64  06-04            Review of Systems	  resp distress      Objective     Physical Examination    heart s1s2  lung dec BS  head nc  head at  tachypnea      Pertinent Lab findings & Imaging      Sams:  NO   Adequate UO     I&O's Detail    03 Jun 2024 07:01  -  04 Jun 2024 07:00  --------------------------------------------------------  IN:  Total IN: 0 mL    OUT:    Voided (mL): 300 mL  Total OUT: 300 mL    Total NET: -300 mL               Discussed with:     Cultures:	        Radiology            ACC: [id number] EXAM:  XR CHEST PORTABLE URGENT 1V   ORDERED BY: MAGNUS LAIRD     PROCEDURE DATE:  06/03/2024          INTERPRETATION:  CLINICAL INDICATION: 88 years  Female with pleural   effusions.    COMPARISON: 5/31/2024    AP view of the chest demonstrates reticular opacities secondary to   chronic fibrosis. No focal consolidation. Trace bilateral pleural   effusions. The pulmonary vasculature is normal. There is no pneumothorax.    The heart, mediastinum and zuly cannot be assessed due to rotation.    Mild thoracic degenerative changes are present. Redemonstrated right   ninth and 10th rib fractures. Right shoulder reverse arthroplasty   reidentified.    IMPRESSION:    No acute infiltrate. Chronic fibrosis.    Right rib fractures.    --- End of Report ---            CHARISSE KIMBALL MD; Attending Radiologist  This document has been electronically signed. Alfredo  3 2024  2:18PM

## 2024-06-04 NOTE — CONSULT NOTE ADULT - ASSESSMENT
87 y/o F with PMHx ILD (not on home O2), pulmonary fibrosis, lung granulomatosis disease (s/p granuloma resection 1995), intestinal obstruction, mild dysplastic syndrome, pernicious anemia, dementia, OA, osteoporosis presenting to the ED s/p unwitnessed fall at home and productive cough. Patient has dementia, majority of history obtained from HHA and daughter at bedside    resp distress  OP  OA  ILD  pulm fibrosis   87 y/o F with PMHx ILD (not on home O2), pulmonary fibrosis, lung granulomatosis disease (s/p granuloma resection 1995), intestinal obstruction, mild dysplastic syndrome, pernicious anemia, dementia, OA, osteoporosis presenting to the ED s/p unwitnessed fall at home and productive cough. Patient has dementia, majority of history obtained from HHA and daughter at bedside    resp distress  OP  OA  ILD  pulm fibrosis    increased work of breathing - will benefit from NIV - BIPAP - however - is not cooperative -   ABG noted -   CXR noted -   Solumedrol and Bronchodilators for chr lung disease  ICU eval noted  may benefit from Anxiolysis - BZD and or Opioids for work of breathing and dyspnea  pt with known Dementia -   Pall eval is appropriate - GOC discussion -   outpatient record reviewed - follows with Dr DIPTI Arenas - [facility]  supportive care and management  at risk for decompensation

## 2024-06-04 NOTE — PROGRESS NOTE ADULT - PROBLEM SELECTOR PLAN 1
AHRF 2/2 PNA on admission. Resp failure likely multifactorial - initially 2/2 acute infection , pt also with underlying ILD  - completed course of antibiotics   - TTE -> estimated at 55 to 60 %, mild pulmonary hypertension.  - Increased O2 requirements on 5/31. CTA chest 6/01 negative for PE, increase b/l effusion  - increased O2 requirements again on 6/4 - pt with desaturation on venti mask requiring placement of NRB  - seen by pulm, recommending BiPAP given tachypnea/inc WOB and ICU consult  - STAT ABG  - Chest CT to eval for empyema  - will start prednisone 40mg daily x5 days  - ICU consulted, will appreciate recs  - Daily diuresis as tolerates   - Pulm consulted Dr. Aguillon AHRF 2/2 PNA on admission. Resp failure likely multifactorial - initially 2/2 acute infection , pt also with underlying ILD  - completed course of antibiotics   - TTE -> estimated at 55 to 60 %, mild pulmonary hypertension.  - Increased O2 requirements on 5/31. CTA chest 6/01 negative for PE, increase b/l effusion  - increased O2 requirements again on 6/4 - pt with desaturation on venti mask requiring placement of NRB  - seen by pulm, recommending BiPAP given tachypnea/inc WOB and ICU consult  - STAT ABG  - Chest CT to eval for empyema  - start solumedrol  - ICU consulted, will appreciate recs  - Daily diuresis as tolerates   - Pulm consulted Dr. Aguillon

## 2024-06-04 NOTE — PROGRESS NOTE ADULT - PROBLEM SELECTOR PLAN 4
hgh 10.4 -> 8.4 -> 7.7 -> 9.6 s/p 1 unit pRBC  - active type and screen 6/4  - hgb has been stable, cont daily monitoring  - no hx of cad or heart disease, transfuse<7  - b12 elevated, ferritin elevated, anemia of chronic disease

## 2024-06-04 NOTE — CONSULT NOTE ADULT - ASSESSMENT
88F PMH ILD (not on home O2), pulmonary fibrosis, lung granulomatous disease (s/p granuloma resection 1995), intestinal obstruction, myelodysplastic syndrome, pernicious anemia, dementia, OA, osteoporosis who presents s/p unwitnessed fall and admitted with hypoxia 2/2 MF PNA 2/2 strep in setting of ILD.      Hypoxic RF 2/2 MF PNA +/- ILD     - Able to defend airway.  Tachypneic with accessory muscle use.  - Primary team attempted bilevel for WOB in setting of multiple desaturations, but unable to tolerate and took mask off.  - Would trial HFNC with goal saturations >88%  - C/w Duonebs ATC, diuresis and steroids  - If WOB does not improve or patient worsens, intubation would be next step, but given severity of ILD, would likely be difficult in liberating from vent.  - Palliative care actively establishing HCP and next steps  - Will re-evaluate after placed on HFNC  - Case discussed and plan formualted with ICU attending

## 2024-06-05 NOTE — PROGRESS NOTE ADULT - ASSESSMENT
89 y/o F with PMHx ILD (not on home O2), pulmonary fibrosis, lung granulomatosis disease (s/p granuloma resection 1995), intestinal obstruction, mild dysplastic syndrome, pernicious anemia, dementia, OA, osteoporosis presenting to the ED s/p unwitnessed fall at home and productive cough. Admitted for PNA, UTI. 87 y/o F with PMHx ILD (not on home O2), pulmonary fibrosis, lung granulomatosis disease (s/p granuloma resection 1995), intestinal obstruction, mild dysplastic syndrome, pernicious anemia, dementia, OA, osteoporosis presenting to the ED s/p unwitnessed fall at home and productive cough. Admitted for PNA, UTI. Course c/b acute hypoxic respiratory failure and now CMO.

## 2024-06-05 NOTE — PROGRESS NOTE ADULT - ATTENDING COMMENTS
89 y/o F with PMHx ILD (not on home O2), pulmonary fibrosis, lung granulomatosis disease (s/p granuloma resection 1995), intestinal obstruction, mild dysplastic syndrome, pernicious anemia, dementia, OA, osteoporosis presenting to the ED s/p unwitnessed fall at home and productive cough. Admitted for PNA, UTI. Course c/b acute hypoxic respiratory failure and now CMO. Sepsis POA sec to likely gram negative PNA +/- UTI. On morphine gtt now per palliative - plan Is for hospice inn. Stop abx given plan for hospice inn. D/w patient and daughter at bedside. D/w PC and ID.

## 2024-06-05 NOTE — PROGRESS NOTE ADULT - SUBJECTIVE AND OBJECTIVE BOX
Date/Time Patient Seen:  		  Referring MD:   Data Reviewed	       Patient is a 88y old  Female who presents with a chief complaint of PNA (04 Jun 2024 13:46)      Subjective/HPI     PAST MEDICAL & SURGICAL HISTORY:  Fibroids  1978    Lymphadenopathy, inguinal  right 1988, removed surgically    Granulomatous lung disease  1995 at [facility] had granuloma removed    Pulmonary fibrosis  8/13    Osteoarthritis    Osteoporosis    Pernicious anemia  2000    Lichen of skin  vaginal area, 2012    Intestinal obstruction  1999    MDS (myelodysplastic syndrome)  2013, found incidentally    BOOP (bronchiolitis obliterans with organizing pneumonia)  1991    ILD (interstitial lung disease)    Mild dementia    Sprain of right rotator cuff capsule, initial encounter    S/P tubal ligation  1976    S/P hysterectomy  Left ovary left in place, 1978    S/P hip replacement  right 2004    S/P hip replacement  left 2013    Elective surgery  (VAT and biopsy, 2014)          Medication list         MEDICATIONS  (STANDING):  cefTRIAXone   IVPB 1000 milliGRAM(s) IV Intermittent every 24 hours  furosemide   Injectable 10 milliGRAM(s) IV Push daily  methylPREDNISolone sodium succinate Injectable 60 milliGRAM(s) IV Push daily  morphine  - Injectable 2 milliGRAM(s) IV Push every 4 hours    MEDICATIONS  (PRN):  aluminum hydroxide/magnesium hydroxide/simethicone Suspension 30 milliLiter(s) Oral every 4 hours PRN Dyspepsia  bisacodyl Suppository 10 milliGRAM(s) Rectal daily PRN Constipation  glycopyrrolate Injectable 0.2 milliGRAM(s) IV Push every 6 hours PRN secretions  LORazepam   Injectable 0.5 milliGRAM(s) IV Push every 2 hours PRN anxiety, agitation, refractory dyspnea  morphine  - Injectable 2 milliGRAM(s) IV Push every 2 hours PRN dyspnea  morphine  - Injectable 1 milliGRAM(s) IV Push every 2 hours PRN Moderate Pain (4 - 6)  morphine  - Injectable 2 milliGRAM(s) IV Push every 2 hours PRN Severe Pain (7 - 10)  ondansetron Injectable 4 milliGRAM(s) IV Push every 8 hours PRN Nausea and/or Vomiting         Vitals log        ICU Vital Signs Last 24 Hrs  T(C): 36.3 (04 Jun 2024 20:58), Max: 36.6 (04 Jun 2024 11:07)  T(F): 97.4 (04 Jun 2024 20:58), Max: 97.9 (04 Jun 2024 11:07)  HR: 98 (04 Jun 2024 20:58) (80 - 98)  BP: 129/60 (04 Jun 2024 20:58) (129/60 - 131/50)  BP(mean): --  ABP: --  ABP(mean): --  RR: 17 (04 Jun 2024 20:58) (17 - 24)  SpO2: 98% (04 Jun 2024 20:58) (92% - 98%)    O2 Parameters below as of 04 Jun 2024 20:58  Patient On (Oxygen Delivery Method): mask, nonrebreather  O2 Flow (L/min): 4               Input and Output:  I&O's Detail    03 Jun 2024 07:01  -  04 Jun 2024 07:00  --------------------------------------------------------  IN:  Total IN: 0 mL    OUT:    Voided (mL): 300 mL  Total OUT: 300 mL    Total NET: -300 mL      04 Jun 2024 07:01  -  05 Jun 2024 05:51  --------------------------------------------------------  IN:    IV PiggyBack: 50 mL    Oral Fluid: 440 mL  Total IN: 490 mL    OUT:  Total OUT: 0 mL    Total NET: 490 mL          Lab Data                        10.4   22.39 )-----------( 247      ( 04 Jun 2024 05:10 )             33.5     06-04    142  |  107  |  37<H>  ----------------------------<  118<H>  4.2   |  30  |  0.97    Ca    9.1      04 Jun 2024 05:10  Phos  3.3     06-03  Mg     2.7     06-03    TPro  6.7  /  Alb  2.3<L>  /  TBili  0.7  /  DBili  x   /  AST  29  /  ALT  35  /  AlkPhos  64  06-04    ABG - ( 04 Jun 2024 13:17 )  pH, Arterial: 7.42  pH, Blood: x     /  pCO2: 44    /  pO2: 83    / HCO3: 28    / Base Excess: 4.0   /  SaO2: 97.4                    Review of Systems	      Objective     Physical Examination    heart s1s2  lung dec BS  head nc      Pertinent Lab findings & Imaging      Latrell:  NO   Adequate UO     I&O's Detail    03 Jun 2024 07:01  -  04 Jun 2024 07:00  --------------------------------------------------------  IN:  Total IN: 0 mL    OUT:    Voided (mL): 300 mL  Total OUT: 300 mL    Total NET: -300 mL      04 Jun 2024 07:01  -  05 Jun 2024 05:51  --------------------------------------------------------  IN:    IV PiggyBack: 50 mL    Oral Fluid: 440 mL  Total IN: 490 mL    OUT:  Total OUT: 0 mL    Total NET: 490 mL               Discussed with:     Cultures:	        Radiology

## 2024-06-05 NOTE — PROGRESS NOTE ADULT - PROBLEM SELECTOR PLAN 2
was on morphine ATC but continues to have labored breathing  will start morphine infusion 1mg/hr  start ativan ATC  c/w IV morphine PRN for dyspnea

## 2024-06-05 NOTE — PROGRESS NOTE ADULT - PROBLEM SELECTOR PLAN 1
AHRF 2/2 PNA on admission. Resp failure likely multifactorial - initially 2/2 acute infection , pt also with underlying ILD  - pt had been on venti mask, yesterday with increasing O2 requirements requiring NRB  - GOC conversation with ICU and palliative, pt placed on comfort care  - c/w oxygen supplementation for comfort  - c/w lasix and steroids  - to start morphine gtt today  - PRN morphine  - palliative consulted AHRF 2/2 PNA on admission. Resp failure likely multifactorial - initially 2/2 acute infection , pt also with underlying ILD  - pt had been on venti mask, yesterday with increasing O2 requirements requiring NRB  - GOC conversation with ICU and palliative, pt placed on comfort care  - c/w oxygen supplementation for comfort  - c/w lasix and steroids  - to start morphine gtt today  - PRN morphine - would utilize this as she appears uncomfortable even on morphine gtt  - palliative consulted

## 2024-06-05 NOTE — PROGRESS NOTE ADULT - SUBJECTIVE AND OBJECTIVE BOX
Patient is a 88y old  Female who presents with a chief complaint of PNA (05 Jun 2024 11:10)    INTERVAL HPI/OVERNIGHT EVENTS: Pt placed on comfort care yesterday. Pt seen and examined at bedside, family at bedside. Pt reports feeling comfortable and is asking when she will be able to go home. Pt remains on NRB mask. No other concerns at this time.    MEDICATIONS  (STANDING):  cefTRIAXone   IVPB 1000 milliGRAM(s) IV Intermittent every 24 hours  furosemide   Injectable 10 milliGRAM(s) IV Push daily  methylPREDNISolone sodium succinate Injectable 60 milliGRAM(s) IV Push daily  morphine  Infusion. 1 mG/Hr (1 mL/Hr) IV Continuous <Continuous>    MEDICATIONS  (PRN):  aluminum hydroxide/magnesium hydroxide/simethicone Suspension 30 milliLiter(s) Oral every 4 hours PRN Dyspepsia  bisacodyl Suppository 10 milliGRAM(s) Rectal daily PRN Constipation  glycopyrrolate Injectable 0.2 milliGRAM(s) IV Push every 6 hours PRN secretions  LORazepam   Injectable 0.5 milliGRAM(s) IV Push every 2 hours PRN anxiety, agitation, refractory dyspnea  morphine  - Injectable 2 milliGRAM(s) IV Push every 2 hours PRN dyspnea  morphine  - Injectable 1 milliGRAM(s) IV Push every 2 hours PRN Moderate Pain (4 - 6)  morphine  - Injectable 2 milliGRAM(s) IV Push every 2 hours PRN Severe Pain (7 - 10)  ondansetron Injectable 4 milliGRAM(s) IV Push every 8 hours PRN Nausea and/or Vomiting      Allergies    sulfa drugs (Short breath)  adhesives (Hives)  PC Pen VK (Other (Mild to Mod))    Intolerances        REVIEW OF SYSTEMS:  CONSTITUTIONAL: No fever or chills  HEENT:  No headache, no sore throat  RESPIRATORY: No cough, wheezing, or shortness of breath  CARDIOVASCULAR: No chest pain, palpitations  GASTROINTESTINAL: No abd pain, nausea, vomiting, or diarrhea  GENITOURINARY: No dysuria, frequency, or hematuria  NEUROLOGICAL: no focal weakness or dizziness  MUSCULOSKELETAL: no myalgias     Vital Signs Last 24 Hrs  T(C): 36.3 (04 Jun 2024 20:58), Max: 36.3 (04 Jun 2024 20:58)  T(F): 97.4 (04 Jun 2024 20:58), Max: 97.4 (04 Jun 2024 20:58)  HR: 98 (04 Jun 2024 20:58) (98 - 98)  BP: 129/60 (04 Jun 2024 20:58) (129/60 - 129/60)  BP(mean): --  RR: 17 (04 Jun 2024 20:58) (17 - 17)  SpO2: 98% (04 Jun 2024 20:58) (98% - 98%)    Parameters below as of 04 Jun 2024 20:58  Patient On (Oxygen Delivery Method): mask, nonrebreather  O2 Flow (L/min): 4      PHYSICAL EXAM:  GENERAL: NAD, NRB in place  HEENT:  anicteric, EOMI  CHEST/LUNG:  diminished breath sounds b/l  HEART:  RRR, S1, S2  ABDOMEN:  BS+, soft, nontender, nondistended  EXTREMITIES: no edema, cyanosis, or calf tenderness  NERVOUS SYSTEM: answers questions and follows commands appropriately    LABS:    CBC Full  -  ( 04 Jun 2024 05:10 )  WBC Count : 22.39 K/uL  Hemoglobin : 10.4 g/dL  Hematocrit : 33.5 %  Platelet Count - Automated : 247 K/uL  Mean Cell Volume : 111.7 fl  Mean Cell Hemoglobin : 34.7 pg  Mean Cell Hemoglobin Concentration : 31.0 gm/dL  Auto Neutrophil # : 20.37 K/uL  Auto Lymphocyte # : 0.22 K/uL  Auto Monocyte # : 1.34 K/uL  Auto Eosinophil # : 0.00 K/uL  Auto Basophil # : 0.00 K/uL  Auto Neutrophil % : 86.0 %  Auto Lymphocyte % : 1.0 %  Auto Monocyte % : 6.0 %  Auto Eosinophil % : 0.0 %  Auto Basophil % : 0.0 %      Ca    9.1        04 Jun 2024 05:10        Urinalysis Basic - ( 04 Jun 2024 05:10 )    Color: x / Appearance: x / SG: x / pH: x  Gluc: 118 mg/dL / Ketone: x  / Bili: x / Urobili: x   Blood: x / Protein: x / Nitrite: x   Leuk Esterase: x / RBC: x / WBC x   Sq Epi: x / Non Sq Epi: x / Bacteria: x      CAPILLARY BLOOD GLUCOSE              RADIOLOGY & ADDITIONAL TESTS:  Personally reviewed.     Consultant(s) Notes Reviewed:  [x] YES  [ ] NO Patient is a 88y old  Female who presents with a chief complaint of PNA (05 Jun 2024 11:10)    INTERVAL HPI/OVERNIGHT EVENTS: Pt placed on comfort care yesterday. Pt seen and examined at bedside, family at bedside. Pt reports feeling comfortable and is asking when she will be able to go home. Pt remains on NRB mask. No other concerns at this time. Appears tachypneic.    MEDICATIONS  (STANDING):  cefTRIAXone   IVPB 1000 milliGRAM(s) IV Intermittent every 24 hours  furosemide   Injectable 10 milliGRAM(s) IV Push daily  methylPREDNISolone sodium succinate Injectable 60 milliGRAM(s) IV Push daily  morphine  Infusion. 1 mG/Hr (1 mL/Hr) IV Continuous <Continuous>    MEDICATIONS  (PRN):  aluminum hydroxide/magnesium hydroxide/simethicone Suspension 30 milliLiter(s) Oral every 4 hours PRN Dyspepsia  bisacodyl Suppository 10 milliGRAM(s) Rectal daily PRN Constipation  glycopyrrolate Injectable 0.2 milliGRAM(s) IV Push every 6 hours PRN secretions  LORazepam   Injectable 0.5 milliGRAM(s) IV Push every 2 hours PRN anxiety, agitation, refractory dyspnea  morphine  - Injectable 2 milliGRAM(s) IV Push every 2 hours PRN dyspnea  morphine  - Injectable 1 milliGRAM(s) IV Push every 2 hours PRN Moderate Pain (4 - 6)  morphine  - Injectable 2 milliGRAM(s) IV Push every 2 hours PRN Severe Pain (7 - 10)  ondansetron Injectable 4 milliGRAM(s) IV Push every 8 hours PRN Nausea and/or Vomiting      Allergies    sulfa drugs (Short breath)  adhesives (Hives)  PC Pen VK (Other (Mild to Mod))    Intolerances        REVIEW OF SYSTEMS:  CONSTITUTIONAL: No fever or chills  HEENT:  No headache, no sore throat  RESPIRATORY: No cough, wheezing, or shortness of breath  CARDIOVASCULAR: No chest pain, palpitations  GASTROINTESTINAL: No abd pain, nausea, vomiting, or diarrhea  GENITOURINARY: No dysuria, frequency, or hematuria  NEUROLOGICAL: no focal weakness or dizziness  MUSCULOSKELETAL: no myalgias     Vital Signs Last 24 Hrs  T(C): 36.3 (04 Jun 2024 20:58), Max: 36.3 (04 Jun 2024 20:58)  T(F): 97.4 (04 Jun 2024 20:58), Max: 97.4 (04 Jun 2024 20:58)  HR: 98 (04 Jun 2024 20:58) (98 - 98)  BP: 129/60 (04 Jun 2024 20:58) (129/60 - 129/60)  BP(mean): --  RR: 17 (04 Jun 2024 20:58) (17 - 17)  SpO2: 98% (04 Jun 2024 20:58) (98% - 98%)    Parameters below as of 04 Jun 2024 20:58  Patient On (Oxygen Delivery Method): mask, nonrebreather  O2 Flow (L/min): 4      PHYSICAL EXAM:  GENERAL: NAD, NRB in place  HEENT:  anicteric, EOMI  CHEST/LUNG:  diminished breath sounds b/l  HEART:  RRR, S1, S2  ABDOMEN:  BS+, soft, nontender, nondistended  EXTREMITIES: no edema, cyanosis, or calf tenderness  NERVOUS SYSTEM: answers questions and follows commands appropriately    LABS:    CBC Full  -  ( 04 Jun 2024 05:10 )  WBC Count : 22.39 K/uL  Hemoglobin : 10.4 g/dL  Hematocrit : 33.5 %  Platelet Count - Automated : 247 K/uL  Mean Cell Volume : 111.7 fl  Mean Cell Hemoglobin : 34.7 pg  Mean Cell Hemoglobin Concentration : 31.0 gm/dL  Auto Neutrophil # : 20.37 K/uL  Auto Lymphocyte # : 0.22 K/uL  Auto Monocyte # : 1.34 K/uL  Auto Eosinophil # : 0.00 K/uL  Auto Basophil # : 0.00 K/uL  Auto Neutrophil % : 86.0 %  Auto Lymphocyte % : 1.0 %  Auto Monocyte % : 6.0 %  Auto Eosinophil % : 0.0 %  Auto Basophil % : 0.0 %      Ca    9.1        04 Jun 2024 05:10        Urinalysis Basic - ( 04 Jun 2024 05:10 )    Color: x / Appearance: x / SG: x / pH: x  Gluc: 118 mg/dL / Ketone: x  / Bili: x / Urobili: x   Blood: x / Protein: x / Nitrite: x   Leuk Esterase: x / RBC: x / WBC x   Sq Epi: x / Non Sq Epi: x / Bacteria: x      CAPILLARY BLOOD GLUCOSE              RADIOLOGY & ADDITIONAL TESTS:  Personally reviewed.     Consultant(s) Notes Reviewed:  [x] YES  [ ] NO

## 2024-06-05 NOTE — PROGRESS NOTE ADULT - PROBLEM SELECTOR PLAN 2
Pt on comfort care    PCP: Dr. Cabral  [phone] Sepsis POA sec to PNA +/- acute UTI  - urine cx with >3 organisms  - CMO and planned for hospice inn  - stop abx - d/w ID

## 2024-06-05 NOTE — PROGRESS NOTE ADULT - PROBLEM SELECTOR PLAN 4
DNR/DNI/CMO  reviewed inpatient hospice with HCPs, amenable to consideration of transfer, likely 6/6  doc to doc sent    start morphing gtt today  start IV ativan ATC today  IV Morphine 2mg q2h prn for dyspnea- goal RR <24  IV Morphine 2mg q2h prn for severe pain  IV Morphine 1mg q2h prn for moderate pain  IV ativan 0.5mg q2h prn for anxiety, agitation, refractory dyspnea  IV glycopyrrolate 0.2mg q6h prn for secretions  dulcolax UT PRN constipation, daily

## 2024-06-05 NOTE — PROGRESS NOTE ADULT - ASSESSMENT
89 y/o F with PMHx ILD (not on home O2), pulmonary fibrosis, lung granulomatosis disease (s/p granuloma resection 1995), intestinal obstruction, mild dysplastic syndrome, pernicious anemia, dementia, OA, osteoporosis presenting to the ED s/p unwitnessed fall at home and productive cough. Patient has dementia, majority of history obtained from HHA and daughter at bedside    resp distress  OP  OA  ILD  pulm fibrosis    on high fio2  on abx  on steroids  pall eval noted -   DNR DNI    increased work of breathing - will benefit from NIV - BIPAP - however - is not cooperative -   ABG noted -   CXR noted -   Solumedrol and Bronchodilators for chr lung disease  ICU eval noted  may benefit from Anxiolysis - BZD and or Opioids for work of breathing and dyspnea  pt with known Dementia -   Pall eval is appropriate - GOC discussion - noted  outpatient record reviewed - follows with Dr DIPTI Arenas - [facility]  supportive care and management  at risk for decompensation

## 2024-06-05 NOTE — PROGRESS NOTE ADULT - PROBLEM SELECTOR PLAN 5
will continue to follow. discussed with BOYD and Dr. Olvin Bloom MD, [facility]; Palliative Care Attending, Ethicist. [phone]

## 2024-06-05 NOTE — PROGRESS NOTE ADULT - SUBJECTIVE AND OBJECTIVE BOX
Indication for Geriatrics and Palliative Care Services/INTERVAL HPI:  SUBJECTIVE AND OBJECTIVE:    OVERNIGHT EVENTS:    DNR on chart:DNI  DNI      Allergies    sulfa drugs (Short breath)  adhesives (Hives)  PC Pen VK (Other (Mild to Mod))    Intolerances    MEDICATIONS  (STANDING):  cefTRIAXone   IVPB 1000 milliGRAM(s) IV Intermittent every 24 hours  furosemide   Injectable 10 milliGRAM(s) IV Push daily  methylPREDNISolone sodium succinate Injectable 60 milliGRAM(s) IV Push daily  morphine  Infusion. 1 mG/Hr (1 mL/Hr) IV Continuous <Continuous>    MEDICATIONS  (PRN):  aluminum hydroxide/magnesium hydroxide/simethicone Suspension 30 milliLiter(s) Oral every 4 hours PRN Dyspepsia  bisacodyl Suppository 10 milliGRAM(s) Rectal daily PRN Constipation  glycopyrrolate Injectable 0.2 milliGRAM(s) IV Push every 6 hours PRN secretions  LORazepam   Injectable 0.5 milliGRAM(s) IV Push every 2 hours PRN anxiety, agitation, refractory dyspnea  morphine  - Injectable 2 milliGRAM(s) IV Push every 2 hours PRN dyspnea  morphine  - Injectable 1 milliGRAM(s) IV Push every 2 hours PRN Moderate Pain (4 - 6)  morphine  - Injectable 2 milliGRAM(s) IV Push every 2 hours PRN Severe Pain (7 - 10)  ondansetron Injectable 4 milliGRAM(s) IV Push every 8 hours PRN Nausea and/or Vomiting      ITEMS UNCHECKED ARE NOT PRESENT    PRESENT SYMPTOMS: [ ]Unable to self-report - see [ ] CPOT [ ] PAINADS [ ] RDOS  Source if other than patient:  [ ]Family   [ ]Team     Pain:  [ ]yes [ ]no  QOL impact -   Location -                    Aggravating factors -  Quality -  Radiation -  Timing-  Severity (0-10 scale):  Minimal acceptable level (0-10 scale):     CPOT:    https://www.sccm.org/getattachment/[email or web address]/Critical-Care-Pain-Observation-Tool-(CPOT)    Dyspnea:                           [ ]Mild [ ]Moderate [ ]Severe  Anxiety:                             [ ]Mild [ ]Moderate [ ]Severe  Fatigue:                             [ ]Mild [ ]Moderate [ ]Severe  Nausea:                             [ ]Mild [ ]Moderate [ ]Severe  Loss of appetite:              [ ]Mild [ ]Moderate [ ]Severe  Constipation:                    [ ]Mild [ ]Moderate [ ]Severe    PCSSQ[Palliative Care Spiritual Screening Question]   Severity (0-10):  Score of 4 or > indicate consideration of Chaplaincy referral.  Chaplaincy Referral: [ ] yes [ ] refused [ ] following [ ] Deferred     Caregiver [place]? : [ ] yes [ ] no [ ] Deferred [ ] Declined             Social work referral [ ] Patient & Family Centered Care Referral [ ]     Anticipatory Grief present?:  [ ] yes [ ] no  [ ] Deferred                  Social work referral [ ] Chaplaincy Referral[ ]      Other Symptoms:  [ ]All other review of systems negative   [ ]Unable to obtain due to poor mentation    Palliative Performance Status Version 2:         %      http://npcrc.org/files/news/palliative_performance_scale_[email or web address].pdf  PHYSICAL EXAM:  Vital Signs Last 24 Hrs  T(C): 36.3 (04 Jun 2024 20:58), Max: 36.6 (04 Jun 2024 11:15)  T(F): 97.4 (04 Jun 2024 20:58), Max: 97.8 (04 Jun 2024 11:15)  HR: 98 (04 Jun 2024 20:58) (98 - 98)  BP: 129/60 (04 Jun 2024 20:58) (129/60 - 131/40)  BP(mean): --  RR: 17 (04 Jun 2024 20:58) (17 - 20)  SpO2: 98% (04 Jun 2024 20:58) (97% - 98%)    Parameters below as of 04 Jun 2024 20:58  Patient On (Oxygen Delivery Method): mask, nonrebreather  O2 Flow (L/min): 4   I&O's Summary    04 Jun 2024 07:01  -  05 Jun 2024 07:00  --------------------------------------------------------  IN: 490 mL / OUT: 0 mL / NET: 490 mL       GENERAL: [ ]Cachexia    [ ]Alert  [ ]Oriented x   [ ]Lethargic  [ ]Unarousable  [ ]Verbal  [ ]Non-Verbal  Behavioral:   [ ]Anxiety  [ ]Delirium [ ]Agitation [ ]Other  HEENT:  [ ]Normal   [ ]Dry mouth   [ ]ET Tube/Trach  [ ]Oral lesions  PULMONARY:   [ ]Clear [ ]Tachypnea  [ ]Audible excessive secretions   [ ]Rhonchi        [ ]Right [ ]Left [ ]Bilateral  [ ]Crackles        [ ]Right [ ]Left [ ]Bilateral  [ ]Wheezing     [ ]Right [ ]Left [ ]Bilateral  [ ]Diminished BS [ ] Right [ ]Left [ ]Bilateral  CARDIOVASCULAR:    [ ]Regular [ ]Irregular [ ]Tachy  [ ]Isael [ ]Murmur [ ]Other  GASTROINTESTINAL:  [ ]Soft  [ ]Distended   [ ]+BS  [ ]Non tender [ ]Tender  [ ]Other [ ]PEG [ ]OGT/ NGT   Last BM:   GENITOURINARY:  [ ]Normal [ ]Incontinent   [ ]Oliguria/Anuria   [ ]Sams  MUSCULOSKELETAL:   [ ]Normal   [ ]Weakness  [ ]Bed/Wheelchair bound [ ]Edema  NEUROLOGIC:   [ ]No focal deficits  [ ] Cognitive impairment  [ ] Dysphagia [ ]Dysarthria [ ] Paresis [ ]Other   SKIN:   [ ]Normal  [ ]Rash  [ ]Other  [ ]Pressure ulcer(s) [ ]y [ ]n present on admission    CRITICAL CARE:  [ ]Shock Present  [ ]Septic [ ]Cardiogenic [ ]Neurologic [ ]Hypovolemic  [ ]Vasopressors [ ]Inotropes  [ ]Respiratory failure present [ ]Mechanical Ventilation [ ]Non-invasive ventilatory support [ ]High-Flow   [ ]Acute  [ ]Chronic [ ]Hypoxic  [ ]Hypercarbic [ ]Other  [ ]Other organ failure     LABS:                        10.4   22.39 )-----------( 247      ( 04 Jun 2024 05:10 )             33.5   06-04    142  |  107  |  37<H>  ----------------------------<  118<H>  4.2   |  30  |  0.97    Ca    9.1      04 Jun 2024 05:10    TPro  6.7  /  Alb  2.3<L>  /  TBili  0.7  /  DBili  x   /  AST  29  /  ALT  35  /  AlkPhos  64  06-04      Urinalysis Basic - ( 04 Jun 2024 05:10 )    Color: x / Appearance: x / SG: x / pH: x  Gluc: 118 mg/dL / Ketone: x  / Bili: x / Urobili: x   Blood: x / Protein: x / Nitrite: x   Leuk Esterase: x / RBC: x / WBC x   Sq Epi: x / Non Sq Epi: x / Bacteria: x      RADIOLOGY & ADDITIONAL STUDIES:    Protein Calorie Malnutrition Present: [ ]mild [ ]moderate [ ]severe [ ]underweight [ ]morbid obesity  https://www.andeal.org/vault/2440/web/files/ONC/Table_Clinical%20Characteristics%20to%20Document%20Malnutrition-White%20JV%20et%20al%150455.pdf    Height (cm): 149.9 (05-27-24 @ 09:10)  Weight (kg): 49.9 (05-27-24 @ 09:10)  BMI (kg/m2): 22.2 (05-27-24 @ 09:10)    [ ]PPSV2 < or = 30%  [ ]significant weight loss [ ]poor nutritional intake [ ]anasarca[ ]Artificial Nutrition    Other REFERRALS:  [ ]Hospice  [ ]Child Life  [ ]Social Work  [ ]Case management [ ]Holistic Therapy     Goals of Care Document: Indication for Geriatrics and Palliative Care Services/INTERVAL HPI: goals of care, eol symptom management  SUBJECTIVE AND OBJECTIVE: Patient seen and examined, awake but with labored breathing, pain with coughing and deep inspiration    OVERNIGHT EVENTS: no acute events, remains on NRB    DNR on chart:DNI  DNI      Allergies    sulfa drugs (Short breath)  adhesives (Hives)  PC Pen VK (Other (Mild to Mod))    Intolerances    MEDICATIONS  (STANDING):  cefTRIAXone   IVPB 1000 milliGRAM(s) IV Intermittent every 24 hours  furosemide   Injectable 10 milliGRAM(s) IV Push daily  methylPREDNISolone sodium succinate Injectable 60 milliGRAM(s) IV Push daily  morphine  Infusion. 1 mG/Hr (1 mL/Hr) IV Continuous <Continuous>    MEDICATIONS  (PRN):  aluminum hydroxide/magnesium hydroxide/simethicone Suspension 30 milliLiter(s) Oral every 4 hours PRN Dyspepsia  bisacodyl Suppository 10 milliGRAM(s) Rectal daily PRN Constipation  glycopyrrolate Injectable 0.2 milliGRAM(s) IV Push every 6 hours PRN secretions  LORazepam   Injectable 0.5 milliGRAM(s) IV Push every 2 hours PRN anxiety, agitation, refractory dyspnea  morphine  - Injectable 2 milliGRAM(s) IV Push every 2 hours PRN dyspnea  morphine  - Injectable 1 milliGRAM(s) IV Push every 2 hours PRN Moderate Pain (4 - 6)  morphine  - Injectable 2 milliGRAM(s) IV Push every 2 hours PRN Severe Pain (7 - 10)  ondansetron Injectable 4 milliGRAM(s) IV Push every 8 hours PRN Nausea and/or Vomiting      ITEMS UNCHECKED ARE NOT PRESENT    PRESENT SYMPTOMS: [ ]Unable to self-report - see [ ] CPOT [ ] PAINADS [ ] RDOS  Source if other than patient:  [ ]Family   [ ]Team     Pain:  [ x]yes [ ]no  QOL impact -   Location -    chest                Aggravating factors - cough  Quality - sharp  Radiation - localized  Timing- intermittent  Severity (0-10 scale): cannot quantify  Minimal acceptable level (0-10 scale):     CPOT:    https://www.sccm.org/getattachment/[email or web address]/Critical-Care-Pain-Observation-Tool-(CPOT)    Dyspnea:                           [ ]Mild [ x]Moderate [ ]Severe  Anxiety:                             [ ]Mild [ ]Moderate [ ]Severe  Fatigue:                             [ ]Mild [ ]Moderate [ ]Severe  Nausea:                             [ ]Mild [ ]Moderate [ ]Severe  Loss of appetite:              [ ]Mild [ ]Moderate [ ]Severe  Constipation:                    [ ]Mild [ ]Moderate [ ]Severe    PCSSQ[Palliative Care Spiritual Screening Question]   Severity (0-10):  Score of 4 or > indicate consideration of Chaplaincy referral.  Chaplaincy Referral: [ ] yes [ ] refused [ ] following [x ] Deferred     Caregiver [place]? : [ ] yes [x ] no [ ] Deferred [ ] Declined             Social work referral [ ] Patient & Family Centered Care Referral [ ]     Anticipatory Grief present?:  [ ] yes [ x] no  [ ] Deferred                  Social work referral [ ] Chaplaincy Referral[ ]      Other Symptoms:  [x ]All other review of systems negative   [ ]Unable to obtain due to poor mentation    Palliative Performance Status Version 2:     30    %      [email or web address]/[email or web address]_performance_[email or web address]_[email or web address].pdf  PHYSICAL EXAM:  Vital Signs Last 24 Hrs  T(C): 36.3 (04 Jun 2024 20:58), Max: 36.6 (04 Jun 2024 11:15)  T(F): 97.4 (04 Jun 2024 20:58), Max: 97.8 (04 Jun 2024 11:15)  HR: 98 (04 Jun 2024 20:58) (98 - 98)  BP: 129/60 (04 Jun 2024 20:58) (129/60 - 131/40)  BP(mean): --  RR: 17 (04 Jun 2024 20:58) (17 - 20)  SpO2: 98% (04 Jun 2024 20:58) (97% - 98%)    Parameters below as of 04 Jun 2024 20:58  Patient On (Oxygen Delivery Method): mask, nonrebreather  O2 Flow (L/min): 4   I&O's Summary    04 Jun 2024 07:01  -  05 Jun 2024 07:00  --------------------------------------------------------  IN: 490 mL / OUT: 0 mL / NET: 490 mL       GENERAL: [ x]Cachexia    [ x]Alert  [x ]Oriented x2   [ ]Lethargic  [ ]Unarousable  [ ]Verbal  [ ]Non-Verbal  Behavioral:   [ ]Anxiety  [ ]Delirium [ ]Agitation [ ]Other  HEENT:  [ ]Normal   [ ]Dry mouth   [ ]ET Tube/Trach  [ ]Oral lesions  PULMONARY:   [ ]Clear [ x]Tachypnea  [ ]Audible excessive secretions   [ ]Rhonchi        [ ]Right [ ]Left [ ]Bilateral  [x ]Crackles        [ ]Right [ ]Left [ x]Bilateral  [ ]Wheezing     [ ]Right [ ]Left [ ]Bilateral  [ x]Diminished BS [ ] Right [ ]Left [ ]Bilateral  CARDIOVASCULAR:    [ ]Regular [ ]Irregular [x ]Tachy  [ ]Isael [ ]Murmur [ ]Other  GASTROINTESTINAL:  [x]Soft  [ ]Distended   [x ]+BS  [ ]Non tender [ ]Tender  [ ]Other [ ]PEG [ ]OGT/ NGT   Last BM:   GENITOURINARY:  [ ]Normal [ x]Incontinent   [ ]Oliguria/Anuria   [ ]Sams  MUSCULOSKELETAL:   [ ]Normal   [x ]Weakness  [ ]Bed/Wheelchair bound [ ]Edema  NEUROLOGIC:   [ ]No focal deficits  [x ] Cognitive impairment  [ ] Dysphagia [ ]Dysarthria [ ] Paresis [ ]Other   SKIN:   [ ]Normal  [ ]Rash  [ ]Other  [ ]Pressure ulcer(s) [ ]y [ ]n present on admission    CRITICAL CARE:  [ ]Shock Present  [ ]Septic [ ]Cardiogenic [ ]Neurologic [ ]Hypovolemic  [ ]Vasopressors [ ]Inotropes  [ x]Respiratory failure present [ ]Mechanical Ventilation [ ]Non-invasive ventilatory support [ ]High-Flow   [ x]Acute  [ ]Chronic [ x]Hypoxic  [ ]Hypercarbic [ ]Other  [ ]Other organ failure     LABS:                        10.4   22.39 )-----------( 247      ( 04 Jun 2024 05:10 )             33.5   06-04    142  |  107  |  37<H>  ----------------------------<  118<H>  4.2   |  30  |  0.97    Ca    9.1      04 Jun 2024 05:10    TPro  6.7  /  Alb  2.3<L>  /  TBili  0.7  /  DBili  x   /  AST  29  /  ALT  35  /  AlkPhos  64  06-04      Urinalysis Basic - ( 04 Jun 2024 05:10 )    Color: x / Appearance: x / SG: x / pH: x  Gluc: 118 mg/dL / Ketone: x  / Bili: x / Urobili: x   Blood: x / Protein: x / Nitrite: x   Leuk Esterase: x / RBC: x / WBC x   Sq Epi: x / Non Sq Epi: x / Bacteria: x      RADIOLOGY & ADDITIONAL STUDIES: no new imaging    Protein Calorie Malnutrition Present: [ ]mild [ ]moderate [ ]severe [ ]underweight [ ]morbid obesity  https://www.andeal.org/vault/2440/web/files/ONC/Table_Clinical%20Characteristics%20to%20Document%20Malnutrition-White%20JV%20et%20al%202012.pdf    Height (cm): 149.9 (05-27-24 @ 09:10)  Weight (kg): 49.9 (05-27-24 @ 09:10)  BMI (kg/m2): 22.2 (05-27-24 @ 09:10)    [ x]PPSV2 < or = 30%  [ ]significant weight loss [x ]poor nutritional intake [ ]anasarca[ ]Artificial Nutrition    Other REFERRALS:  [ x]Hospice  [ ]Child Life  [ x]Social Work  [ ]Case management [ ]Holistic Therapy     Goals of Care Document:

## 2024-06-05 NOTE — PROGRESS NOTE ADULT - ASSESSMENT
87 y/o F with PMHx ILD (not on home O2), pulmonary fibrosis, lung granulomatosis disease (s/p granuloma resection 1995), intestinal obstruction, mild dysplastic syndrome, pernicious anemia, dementia, OA, osteoporosis presenting to the ED s/p unwitnessed fall at home and productive cough. Patient with exacerbation of ILD with rip fractures and fulminant respiratory failure. Palliative consulted for goc

## 2024-06-05 NOTE — PATIENT CHOICE NOTE. - NSPTCHOICESTATE_GEN_ALL_CORE
I have met with the patient and/or caregiver to discuss discharge goals and treatment plan. Patient and/or caregiver also provided with instructions on accessing the CMS Compare websites for additional information related to Post Acute Provider quality and resource use measures to assist them in evaluation of the providers and in selecting their post-acute provider of choice. Patient and caregiver were informed of the facilities that are owned and/or operated by [facility]. I have discussed with the patient the availability of in-network facilities and providers. Patient and caregiver provided with a list of post-acute providers whose services are appropriate to the discharge plans and patient needs.     For patient requiring durable medical equipment, patient and/or caregiver were informed that they have the right to request who provides the required equipment.
I have met with the patient and/or caregiver to discuss discharge goals and treatment plan. Patient and/or caregiver also provided with instructions on accessing the CMS Compare websites for additional information related to Post Acute Provider quality and resource use measures to assist them in evaluation of the providers and in selecting their post-acute provider of choice. Patient and caregiver were informed of the facilities that are owned and/or operated by [facility]. I have discussed with the patient the availability of in-network facilities and providers. Patient and caregiver provided with a list of post-acute providers whose services are appropriate to the discharge plans and patient needs.     For patient requiring durable medical equipment, patient and/or caregiver were informed that they have the right to request who provides the required equipment.
I have met with the patient and/or caregiver to discuss discharge goals and treatment plan. Patient and/or caregiver also provided with instructions on accessing the CMS Compare websites for additional information related to Post Acute Provider quality and resource use measures to assist them in evaluation of the providers and in selecting their post-acute provider of choice. Patient and caregiver were informed of the facilities that are owned and/or operated by [facility]. I have discussed with the patient the availability of in-network facilities and providers. Patient and caregiver provided with a list of post-acute providers whose services are appropriate to the discharge plans and patient needs.     For patient requiring durable medical equipment, patient and/or caregiver were informed that they have the right to request who provides the required equipment.

## 2024-06-05 NOTE — PROGRESS NOTE ADULT - CONVERSATION DETAILS
Met with patient along with HCPs Kamille and Puja  Discussed current status including respiratory failure and choice made to prioritize symptoms.  Patient agreed with HCPs regarding DNR/DNI status. She agrees that HCPs assist with medical decision making    Reviewed with HCPs likely need for inpatient hospice given ongoing labored breathing and pain.  Discussed option for inpatient hospice at \[facility]\"" in [place] pending their agreement and bed availabilty.  Family open to starting morphine drip on this date as patient complained to pain to them and she "never verbalizes pain".   Emotional support provided and referral process discussed. Family agreeable to be considered for transfer on 6/6.

## 2024-06-05 NOTE — PROGRESS NOTE ADULT - PROBLEM SELECTOR PLAN 3
awake and alert but some confusion  attempting to participate in aspects of conversation  confirms wishes for DNR/DNI status

## 2024-06-05 NOTE — SOCIAL WORK PROGRESS NOTE - NSSWPROGRESSNOTE_GEN_ALL_CORE
Sw has been in contact with Palliative team today. Pt is on non breather at this time and palliative is starting pt on a morphine drip at this time. Sw was informed to hold off with hospice inpatient referral until possibly later today or in the am.

## 2024-06-05 NOTE — PROGRESS NOTE ADULT - SUBJECTIVE AND OBJECTIVE BOX
[facility]  INFECTIOUS DISEASES   [address] [place] [address]  Tel: [phone]     Fax: [phone]  ========================================================  MD Massimo Barakat Kaushal, MD Cho, Michelle, MD Sunjit, Jaspal, MD  ========================================================    [id number]  VALORIE BONI     Follow up: Pneumonia     Now lethargic on NRB mask with mild respiratory distress.   No fever. HHA at the bedside.     PAST MEDICAL & SURGICAL HISTORY:  Fibroids  1978  Lymphadenopathy, inguinal  right 1988, removed surgically  Granulomatous lung disease  1995 at [facility] had granuloma removed  Pulmonary fibrosis  8/13  Osteoarthritis  Osteoporosis  Pernicious anemia  2000  Lichen of skin  vaginal area, 2012  Intestinal obstruction  1999  MDS (myelodysplastic syndrome)  2013, found incidentally  BOOP (bronchiolitis obliterans with organizing pneumonia)  1991  ILD (interstitial lung disease)  Mild dementia  Sprain of right rotator cuff capsule, initial encounter  S/P tubal ligation  1976  S/P hysterectomy  Left ovary left in place, 1978  S/P hip replacement  right 2004  S/P hip replacement  left 2013  Elective surgery  (VAT and biopsy, 2014)    Social Hx: No current smoking, EtOH or drugs     FAMILY HISTORY:  Noncontributory     Allergies  sulfa drugs (Short breath)  adhesives (Hives)  PC Pen VK (Other (Mild to Mod))    MEDICATIONS  (STANDING):  acetaminophen     Tablet .. 1000 milliGRAM(s) Oral every 8 hours  albuterol/ipratropium for Nebulization 3 milliLiter(s) Nebulizer every 6 hours  cholecalciferol 1000 Unit(s) Oral daily  cyanocobalamin 1000 MICROGram(s) Oral daily  donepezil 10 milliGRAM(s) Oral at bedtime  furosemide   Injectable 10 milliGRAM(s) IV Push daily  guaiFENesin Oral Liquid (Sugar-Free) 200 milliGRAM(s) Oral every 6 hours  lidocaine   4% Patch 1 Patch Transdermal every 24 hours  memantine 10 milliGRAM(s) Oral two times a day  mirtazapine 15 milliGRAM(s) Oral at bedtime  predniSONE   Tablet 40 milliGRAM(s) Oral daily  sodium chloride 3%  Inhalation 4 milliLiter(s) Inhalation every 12 hours     REVIEW OF SYSTEMS:  CONSTITUTIONAL:  No Fever or chills  HEENT:  No diplopia or blurred vision.  No sore throat or runny nose.  CARDIOVASCULAR:  No chest pain   RESPIRATORY:  No cough, +shortness of breath  GASTROINTESTINAL:  No nausea, vomiting or diarrhea.  GENITOURINARY:  No dysuria, frequency or urgency. No Blood in urine  MUSCULOSKELETAL:  no joint aches, no muscle pain  SKIN:  No change in skin, hair or nails.    Physical Exam:  Vital Signs Last 24 Hrs  T(C): 36.3 (04 Jun 2024 20:58), Max: 36.3 (04 Jun 2024 20:58)  T(F): 97.4 (04 Jun 2024 20:58), Max: 97.4 (04 Jun 2024 20:58)  HR: 98 (04 Jun 2024 20:58) (98 - 98)  BP: 129/60 (04 Jun 2024 20:58) (129/60 - 129/60)  BP(mean): --  RR: 17 (04 Jun 2024 20:58) (17 - 17)  SpO2: 98% (04 Jun 2024 20:58) (98% - 98%)  Parameters below as of 04 Jun 2024 20:58  Patient On (Oxygen Delivery Method): mask, nonrebreather  O2 Flow (L/min): 4  GEN: NAD  HEENT: normocephalic and atraumatic. EOMI. PERRL.    NECK: Supple.  No lymphadenopathy   LUNGS: Crackles bilaterally throughout   HEART: Regular rate and rhythm  ABDOMEN: Soft, nontender, and nondistended.    : No CVA tenderness  EXTREMITIES: Without edema.  NEUROLOGIC: grossly intact.  PSYCHIATRIC: Awake and alert but not oriented   SKIN: No rash       Labs:                        10.4   22.39 )-----------( 247      ( 04 Jun 2024 05:10 )             33.5     06-04    142  |  107  |  37<H>  ----------------------------<  118<H>  4.2   |  30  |  0.97    Ca    9.1      04 Jun 2024 05:10    TPro  6.7  /  Alb  2.3<L>  /  TBili  0.7  /  DBili  x   /  AST  29  /  ALT  35  /  AlkPhos  64  06-04      Culture - Blood (collected 05-27-24 @ 09:35)  Source: .Blood Blood-Peripheral  Final Report (06-01-24 @ 16:00):    No growth at 5 days    Culture - Urine (collected 05-27-24 @ 09:30)  Source: Clean Catch Clean Catch (Midstream)  Final Report (05-28-24 @ 15:03):    >=3 organisms. Probable collection contamination.    Culture - Blood (collected 05-27-24 @ 09:30)  Source: .Blood Blood-Peripheral  Final Report (06-01-24 @ 16:01):    No growth at 5 days    WBC Count: 22.39 K/uL (06-04-24 @ 05:10)  WBC Count: 14.04 K/uL (06-03-24 @ 07:35)  WBC Count: 15.33 K/uL (06-02-24 @ 08:50)  WBC Count: 17.96 K/uL (06-01-24 @ 16:05)  WBC Count: 17.65 K/uL (06-01-24 @ 05:55)    Creatinine: 0.97 mg/dL (06-04-24 @ 05:10)  Creatinine: 0.97 mg/dL (06-03-24 @ 07:35)  Creatinine: 1.00 mg/dL (06-02-24 @ 08:50)  Creatinine: 1.00 mg/dL (06-01-24 @ 05:55)    Ferritin: 643 ng/mL (05-29-24 @ 08:30)    Procalcitonin: 0.63 ng/mL (06-02-24 @ 08:50)  Procalcitonin: 0.32 ng/mL (05-28-24 @ 07:36)     SARS-CoV-2: NotDetec (05-27-24 @ 09:30)    All imaging and other data have been reviewed.  < from: CT Chest No Cont (05.27.24 @ 10:55) >  IMPRESSION:  1.  RIGHT lateral 7th rib fracture (309/141). Minimal anterior RIGHT   6th,7th rib deformities/possible additional nondisplaced fractures   (309-174, 191).  2.  Age indeterminant LEFT pubic fractures favors chronic etiology (area   degraded by motion unsharpness and orthopedic beam hardening artifact).  3.  Bilateral lung patchy airspace and groundglass opacities. Chronic   underlying lung disease cannot be distinguished from multifocal   bronchopneumonia or combination of both. Small dependent pleural effusions  4.  Prominent/normal size spleen- wandering spleen,  enlarged from prior   exams. Clinical correlation with regard to LUQ pain recommended    Assessment and Plan:   89 y/o woman with PMH of ILD/pulmonary fibrosis, lung granulomatosis disease (s/p granuloma resection 1995), myelodysplastic syndrome, pernicious anemia and dementia was admitted   after an un unwitnessed fall at home and productive cough. Daughter is at the bedside. No fever. Has some sough and SOB on o2 NC now.   Leukocytosis 18.45   CT chest with GGO in both sides with underlying fibrosis and possibly multifocal bronchopneumonia, also she had multiple rib fractures. UA with 15 WBC otherwise negative.     Now on NRB O2 still with mild respiratory distress. Seen by palliative and family has decided to continue with comfort measures. No fever.     # Pneumonia?   # ILD  # UTI?  # Fall and rib fractures     - Blood cultures negative on 5/27  - UC with >3 different colonies possibly contamination  - Urinary strep Ag is positive but Legionella U ag is negative   - Completed ceftriaxone 1gm daily, for total of 5dyas on 5/31  - Restarted on ceftriaxone on 6/4 due to worsening Leukocytosis that can be stopped   - ILD causing some of her symptoms and respiratory distress?   - Seen by palliative and now on comfort measures.     Will sign off please call if any question or change in her status.   D/W Dr. Bah.     Sara Moya MD  Division of Infectious Diseases   Please call ID service at [phone] with any question.    50 minutes spent on total encounter assessing patient, examination, chart review, counseling and coordinating care by the attending physician/nurse/care manager.

## 2024-06-06 NOTE — PROGRESS NOTE ADULT - PROBLEM SELECTOR PLAN 1
AHRF 2/2 PNA on admission. Resp failure likely multifactorial - initially 2/2 acute infection , pt also with underlying ILD  - pt had been on venti mask with increasing O2 requirements requiring NRB  - GOC conversation with ICU and palliative, pt placed on comfort care  - c/w oxygen supplementation for comfort  - c/w lasix and steroids  - c/w morphine gtt  - PRN morphine  - pt appears close to EOL on exam today - family at bedside - palliative care following AHRF 2/2 PNA on admission. Resp failure likely multifactorial - initially 2/2 acute infection , pt also with underlying ILD  - pt had been on venti mask with increasing O2 requirements requiring NRB  - GOC conversation with ICU and palliative, pt placed on comfort care  - c/w oxygen supplementation for comfort  - c/w lasix and steroids  - c/w morphine gtt  - c/w PRN morphine  - pt appears close to EOL on exam today - family at bedside - palliative care following

## 2024-06-06 NOTE — PROGRESS NOTE ADULT - TIME BILLING
activities including direct patient care, counseling and/or coordinating care, reviewing notes/lab data/imaging, and discussion with multidisciplinary team (excluding time spent on resident teaching)
activities including direct patient care, counseling and/or coordinating care, reviewing notes/lab data/imaging, and discussion with multidisciplinary team (excluding time spent on resident teaching)
Note written by attending, see above.  Time spent: 50min. More than 50% of the visit was spent counseling the patient on medical condition and coordination of care
Pt seen + examined. Case discussed with resident. Agree with assessment and plan above (edited by me in detail):  Time spent: 55min. More than 50% of the visit was spent counseling the patient on medical condition and coordination of care, excluding teaching time.
as above
as above
Pt seen + examined. Case discussed with resident. Agree with assessment and plan above (edited by me in detail):  Time spent: 54min. More than 50% of the visit was spent counseling the patient on medical condition and coordination of care, excluding teaching time.

## 2024-06-06 NOTE — PROGRESS NOTE ADULT - SUBJECTIVE AND OBJECTIVE BOX
Indication for Geriatrics and Palliative Care Services/INTERVAL HPI:  SUBJECTIVE AND OBJECTIVE:    OVERNIGHT EVENTS:    DNR on chart:DNI  DNI      Allergies    sulfa drugs (Short breath)  adhesives (Hives)  PC Pen VK (Other (Mild to Mod))    Intolerances    MEDICATIONS  (STANDING):  furosemide   Injectable 10 milliGRAM(s) IV Push daily  lidocaine   4% Patch 1 Patch Transdermal daily  LORazepam   Injectable 0.5 milliGRAM(s) IV Push every 8 hours  methylPREDNISolone sodium succinate Injectable 60 milliGRAM(s) IV Push daily  morphine  Infusion. 1 mG/Hr (1 mL/Hr) IV Continuous <Continuous>    MEDICATIONS  (PRN):  aluminum hydroxide/magnesium hydroxide/simethicone Suspension 30 milliLiter(s) Oral every 4 hours PRN Dyspepsia  bisacodyl Suppository 10 milliGRAM(s) Rectal daily PRN Constipation  glycopyrrolate Injectable 0.2 milliGRAM(s) IV Push every 6 hours PRN secretions  LORazepam   Injectable 0.5 milliGRAM(s) IV Push every 2 hours PRN anxiety, agitation, refractory dyspnea  morphine  - Injectable 2 milliGRAM(s) IV Push every 2 hours PRN dyspnea  morphine  - Injectable 1 milliGRAM(s) IV Push every 2 hours PRN Moderate Pain (4 - 6)  morphine  - Injectable 2 milliGRAM(s) IV Push every 2 hours PRN Severe Pain (7 - 10)  ondansetron Injectable 4 milliGRAM(s) IV Push every 8 hours PRN Nausea and/or Vomiting      ITEMS UNCHECKED ARE NOT PRESENT    PRESENT SYMPTOMS: [ ]Unable to self-report - see [ ] CPOT [ ] PAINADS [ ] RDOS  Source if other than patient:  [ ]Family   [ ]Team     Pain:  [ ]yes [ ]no  QOL impact -   Location -                    Aggravating factors -  Quality -  Radiation -  Timing-  Severity (0-10 scale):  Minimal acceptable level (0-10 scale):     CPOT:    https://www.[facility].org/getattachment/[email or web address]-6m1i-[email or web address]/Critical-Care-Pain-Observation-Tool-(CPOT)    Dyspnea:                           [ ]Mild [ ]Moderate [ ]Severe  Anxiety:                             [ ]Mild [ ]Moderate [ ]Severe  Fatigue:                             [ ]Mild [ ]Moderate [ ]Severe  Nausea:                             [ ]Mild [ ]Moderate [ ]Severe  Loss of appetite:              [ ]Mild [ ]Moderate [ ]Severe  Constipation:                    [ ]Mild [ ]Moderate [ ]Severe    PCSSQ[Palliative Care Spiritual Screening Question]   Severity (0-10):  Score of 4 or > indicate consideration of Chaplaincy referral.  Chaplaincy Referral: [ ] yes [ ] refused [ ] following [ ] Deferred     Caregiver [place]? : [ ] yes [ ] no [ ] Deferred [ ] Declined             Social work referral [ ] Patient & Family Centered Care Referral [ ]     Anticipatory Grief present?:  [ ] yes [ ] no  [ ] Deferred                  Social work referral [ ] Chaplaincy Referral[ ]      Other Symptoms:  [ ]All other review of systems negative   [ ]Unable to obtain due to poor mentation    Palliative Performance Status Version 2:         %      http://npcrc.org/files/news/palliative_performance_scale_ppsv2.pdf  PHYSICAL EXAM:  Vital Signs Last 24 Hrs  T(C): --  T(F): --  HR: --  BP: --  BP(mean): --  RR: --  SpO2: --     I&O's Summary     GENERAL: [ ]Cachexia    [ ]Alert  [ ]Oriented x   [ ]Lethargic  [ ]Unarousable  [ ]Verbal  [ ]Non-Verbal  Behavioral:   [ ]Anxiety  [ ]Delirium [ ]Agitation [ ]Other  HEENT:  [ ]Normal   [ ]Dry mouth   [ ]ET Tube/Trach  [ ]Oral lesions  PULMONARY:   [ ]Clear [ ]Tachypnea  [ ]Audible excessive secretions   [ ]Rhonchi        [ ]Right [ ]Left [ ]Bilateral  [ ]Crackles        [ ]Right [ ]Left [ ]Bilateral  [ ]Wheezing     [ ]Right [ ]Left [ ]Bilateral  [ ]Diminished BS [ ] Right [ ]Left [ ]Bilateral  CARDIOVASCULAR:    [ ]Regular [ ]Irregular [ ]Tachy  [ ]Isael [ ]Murmur [ ]Other  GASTROINTESTINAL:  [ ]Soft  [ ]Distended   [ ]+BS  [ ]Non tender [ ]Tender  [ ]Other [ ]PEG [ ]OGT/ NGT   Last BM:   GENITOURINARY:  [ ]Normal [ ]Incontinent   [ ]Oliguria/Anuria   [ ]Sams  MUSCULOSKELETAL:   [ ]Normal   [ ]Weakness  [ ]Bed/Wheelchair bound [ ]Edema  NEUROLOGIC:   [ ]No focal deficits  [ ] Cognitive impairment  [ ] Dysphagia [ ]Dysarthria [ ] Paresis [ ]Other   SKIN:   [ ]Normal  [ ]Rash  [ ]Other  [ ]Pressure ulcer(s) [ ]y [ ]n present on admission    CRITICAL CARE:  [ ]Shock Present  [ ]Septic [ ]Cardiogenic [ ]Neurologic [ ]Hypovolemic  [ ]Vasopressors [ ]Inotropes  [ ]Respiratory failure present [ ]Mechanical Ventilation [ ]Non-invasive ventilatory support [ ]High-Flow   [ ]Acute  [ ]Chronic [ ]Hypoxic  [ ]Hypercarbic [ ]Other  [ ]Other organ failure     LABS:            RADIOLOGY & ADDITIONAL STUDIES:    Protein Calorie Malnutrition Present: [ ]mild [ ]moderate [ ]severe [ ]underweight [ ]morbid obesity  https://www.andeal.org/vault/2440/web/files/ONC/Table_Clinical%20Characteristics%20to%20Document%20Malnutrition-White%20JV%20et%20al%311964.pdf    Height (cm): 149.9 (05-27-24 @ 09:10)  Weight (kg): 49.9 (05-27-24 @ 09:10)  BMI (kg/m2): 22.2 (05-27-24 @ 09:10)    [ ]PPSV2 < or = 30%  [ ]significant weight loss [ ]poor nutritional intake [ ]anasarca[ ]Artificial Nutrition    Other REFERRALS:  [ ]Hospice  [ ]Child Life  [ ]Social Work  [ ]Case management [ ]Holistic Therapy     Goals of Care Document: Indication for Geriatrics and Palliative Care Services/INTERVAL HPI: end of life symptom management  SUBJECTIVE AND OBJECTIVE: Patient seen and examined, actively dying with cheyne-macias breathing. reviewed plan of care with family at bedside. at this time, given breathing pattern discussed likely staying in hospital rather than transferring.    OVERNIGHT EVENTS: started on morphine gtt on 6/5.    DNR on chart:DNI  DNI      Allergies    sulfa drugs (Short breath)  adhesives (Hives)  PC Pen VK (Other (Mild to Mod))    Intolerances    MEDICATIONS  (STANDING):  furosemide   Injectable 10 milliGRAM(s) IV Push daily  lidocaine   4% Patch 1 Patch Transdermal daily  LORazepam   Injectable 0.5 milliGRAM(s) IV Push every 8 hours  methylPREDNISolone sodium succinate Injectable 60 milliGRAM(s) IV Push daily  morphine  Infusion. 1 mG/Hr (1 mL/Hr) IV Continuous <Continuous>    MEDICATIONS  (PRN):  aluminum hydroxide/magnesium hydroxide/simethicone Suspension 30 milliLiter(s) Oral every 4 hours PRN Dyspepsia  bisacodyl Suppository 10 milliGRAM(s) Rectal daily PRN Constipation  glycopyrrolate Injectable 0.2 milliGRAM(s) IV Push every 6 hours PRN secretions  LORazepam   Injectable 0.5 milliGRAM(s) IV Push every 2 hours PRN anxiety, agitation, refractory dyspnea  morphine  - Injectable 2 milliGRAM(s) IV Push every 2 hours PRN dyspnea  morphine  - Injectable 1 milliGRAM(s) IV Push every 2 hours PRN Moderate Pain (4 - 6)  morphine  - Injectable 2 milliGRAM(s) IV Push every 2 hours PRN Severe Pain (7 - 10)  ondansetron Injectable 4 milliGRAM(s) IV Push every 8 hours PRN Nausea and/or Vomiting      ITEMS UNCHECKED ARE NOT PRESENT    PRESENT SYMPTOMS: [x ]Unable to self-report - see [ ] CPOT [x ] PAINADS [ x] RDOS  Source if other than patient:  [ ]Family   [ ]Team     Pain:  [ ]yes [ ]no  QOL impact -   Location -                    Aggravating factors -  Quality -  Radiation -  Timing-  Severity (0-10 scale):  Minimal acceptable level (0-10 scale):     CPOT:    https://www.[facility].org/getattachment/[email or web address]-6k7h-4g1h-7005o4493q8b/Critical-Care-Pain-Observation-Tool-(CPOT)    Dyspnea:                           [ ]Mild [ ]Moderate [ ]Severe  Anxiety:                             [ ]Mild [ ]Moderate [ ]Severe  Fatigue:                             [ ]Mild [ ]Moderate [ ]Severe  Nausea:                             [ ]Mild [ ]Moderate [ ]Severe  Loss of appetite:              [ ]Mild [ ]Moderate [ ]Severe  Constipation:                    [ ]Mild [ ]Moderate [ ]Severe    PCSSQ[Palliative Care Spiritual Screening Question]   Severity (0-10):  Score of 4 or > indicate consideration of Chaplaincy referral.  Chaplaincy Referral: [ ] yes [ ] refused [ ] following [ x] Deferred     Caregiver [place]? : [ ] yes [ x] no [ ] Deferred [ ] Declined             Social work referral [ ] Patient & Family Centered Care Referral [ ]     Anticipatory Grief present?:  [ ] yes [x ] no  [ ] Deferred                  Social work referral [ ] Chaplaincy Referral[ ]      Other Symptoms:  [ ]All other review of systems negative   [ x]Unable to obtain due to poor mentation    Palliative Performance Status Version 2:    10     %      http://npcrc.org/files/news/palliative_performance_scale_ppsv2.pdf  PHYSICAL EXAM:  Vital Signs Last 24 Hrs  T(C): --  T(F): --  HR: --  BP: --  BP(mean): --  RR: --  SpO2: --     I&O's Summary     GENERAL: [ ]Cachexia    [ ]Alert  [ ]Oriented x   [x ]Lethargic  [ ]Unarousable  [ ]Verbal  [ ]Non-Verbal  Behavioral:   [ ]Anxiety  [ ]Delirium [ ]Agitation [ ]Other  HEENT:  [ ]Normal   [x ]Dry mouth   [ ]ET Tube/Trach  [ ]Oral lesions  PULMONARY:   [ ]Clear [ ]Tachypnea  [ ]Audible excessive secretions   [ ]Rhonchi        [ ]Right [ ]Left [ ]Bilateral  [ ]Crackles        [ ]Right [ ]Left [ ]Bilateral  [ ]Wheezing     [ ]Right [ ]Left [ ]Bilateral  [x ]Diminished BS [ ] Right [ ]Left [ ]Bilateral  CARDIOVASCULAR:    [x ]Regular [ ]Irregular [ ]Tachy  [ ]Isael [ ]Murmur [ ]Other  GASTROINTESTINAL:  [x ]Soft  [ ]Distended   [ ]+BS  [ ]Non tender [ ]Tender  [ ]Other [ ]PEG [ ]OGT/ NGT   Last BM:   GENITOURINARY:  [ ]Normal [ x]Incontinent   [ ]Oliguria/Anuria   [ ]Sams  MUSCULOSKELETAL:   [ ]Normal   [x ]Weakness  [ ]Bed/Wheelchair bound [ ]Edema  NEUROLOGIC: unarousable  [ ]No focal deficits  [ ] Cognitive impairment  [ ] Dysphagia [ ]Dysarthria [ ] Paresis [x ]Other   SKIN:   [ ]Normal  [ ]Rash  [ ]Other  [ ]Pressure ulcer(s) [ ]y [ ]n present on admission    CRITICAL CARE:  [ ]Shock Present  [ ]Septic [ ]Cardiogenic [ ]Neurologic [ ]Hypovolemic  [ ]Vasopressors [ ]Inotropes  [ ]Respiratory failure present [ ]Mechanical Ventilation [ ]Non-invasive ventilatory support [ ]High-Flow   [ ]Acute  [ ]Chronic [ ]Hypoxic  [ ]Hypercarbic [ ]Other  [ ]Other organ failure     LABS: no new labs      RADIOLOGY & ADDITIONAL STUDIES: no new imaging    Protein Calorie Malnutrition Present: [ ]mild [ ]moderate [ ]severe [ ]underweight [ ]morbid obesity  https://www.andeal.org/vault/2440/web/files/ONC/Table_Clinical%20Characteristics%20to%20Document%20Malnutrition-White%20JV%20et%20al%717836.pdf    Height (cm): 149.9 (05-27-24 @ 09:10)  Weight (kg): 49.9 (05-27-24 @ 09:10)  BMI (kg/m2): 22.2 (05-27-24 @ 09:10)    [ x]PPSV2 < or = 30%  [ ]significant weight loss [x ]poor nutritional intake [ ]anasarca[ ]Artificial Nutrition    Other REFERRALS:  [ ]Hospice  [ ]Child Life  [ x]Social Work  [ ]Case management [ ]Holistic Therapy     Goals of Care Document: Indication for Geriatrics and Palliative Care Services/INTERVAL HPI: end of life symptom management  SUBJECTIVE AND OBJECTIVE: Patient seen and examined, actively dying with cheyne-macias breathing. reviewed plan of care with family at bedside. at this time, given breathing pattern discussed likely staying in hospital rather than transferring.  discussed with family: removal of NRB; they are in agreement    OVERNIGHT EVENTS: started on morphine gtt on 6/5.    DNR on chart:DNI  DNI      Allergies    sulfa drugs (Short breath)  adhesives (Hives)  PC Pen VK (Other (Mild to Mod))    Intolerances    MEDICATIONS  (STANDING):  furosemide   Injectable 10 milliGRAM(s) IV Push daily  lidocaine   4% Patch 1 Patch Transdermal daily  LORazepam   Injectable 0.5 milliGRAM(s) IV Push every 8 hours  methylPREDNISolone sodium succinate Injectable 60 milliGRAM(s) IV Push daily  morphine  Infusion. 1 mG/Hr (1 mL/Hr) IV Continuous <Continuous>    MEDICATIONS  (PRN):  aluminum hydroxide/magnesium hydroxide/simethicone Suspension 30 milliLiter(s) Oral every 4 hours PRN Dyspepsia  bisacodyl Suppository 10 milliGRAM(s) Rectal daily PRN Constipation  glycopyrrolate Injectable 0.2 milliGRAM(s) IV Push every 6 hours PRN secretions  LORazepam   Injectable 0.5 milliGRAM(s) IV Push every 2 hours PRN anxiety, agitation, refractory dyspnea  morphine  - Injectable 2 milliGRAM(s) IV Push every 2 hours PRN dyspnea  morphine  - Injectable 1 milliGRAM(s) IV Push every 2 hours PRN Moderate Pain (4 - 6)  morphine  - Injectable 2 milliGRAM(s) IV Push every 2 hours PRN Severe Pain (7 - 10)  ondansetron Injectable 4 milliGRAM(s) IV Push every 8 hours PRN Nausea and/or Vomiting      ITEMS UNCHECKED ARE NOT PRESENT    PRESENT SYMPTOMS: [x ]Unable to self-report - see [ ] CPOT [x ] PAINADS [ x] RDOS  Source if other than patient:  [ ]Family   [ ]Team     Pain:  [ ]yes [ ]no  QOL impact -   Location -                    Aggravating factors -  Quality -  Radiation -  Timing-  Severity (0-10 scale):  Minimal acceptable level (0-10 scale):     CPOT:    https://www.[facility].org/getattachment/[email or web address]-1l5h-6t0e-3669n0630c2m/Critical-Care-Pain-Observation-Tool-(CPOT)    Dyspnea:                           [ ]Mild [ ]Moderate [ ]Severe  Anxiety:                             [ ]Mild [ ]Moderate [ ]Severe  Fatigue:                             [ ]Mild [ ]Moderate [ ]Severe  Nausea:                             [ ]Mild [ ]Moderate [ ]Severe  Loss of appetite:              [ ]Mild [ ]Moderate [ ]Severe  Constipation:                    [ ]Mild [ ]Moderate [ ]Severe    PCSSQ[Palliative Care Spiritual Screening Question]   Severity (0-10):  Score of 4 or > indicate consideration of Chaplaincy referral.  Chaplaincy Referral: [ ] yes [ ] refused [ ] following [ x] Deferred     Caregiver [place]? : [ ] yes [ x] no [ ] Deferred [ ] Declined             Social work referral [ ] Patient & Family Centered Care Referral [ ]     Anticipatory Grief present?:  [ ] yes [x ] no  [ ] Deferred                  Social work referral [ ] Chaplaincy Referral[ ]      Other Symptoms:  [ ]All other review of systems negative   [ x]Unable to obtain due to poor mentation    Palliative Performance Status Version 2:    10     %      http://npcrc.org/files/news/palliative_performance_scale_ppsv2.pdf  PHYSICAL EXAM:  Vital Signs Last 24 Hrs  T(C): --  T(F): --  HR: --  BP: --  BP(mean): --  RR: --  SpO2: --     I&O's Summary     GENERAL: [ ]Cachexia    [ ]Alert  [ ]Oriented x   [x ]Lethargic  [ ]Unarousable  [ ]Verbal  [ ]Non-Verbal  Behavioral:   [ ]Anxiety  [ ]Delirium [ ]Agitation [ ]Other  HEENT:  [ ]Normal   [x ]Dry mouth   [ ]ET Tube/Trach  [ ]Oral lesions  PULMONARY:   [ ]Clear [ ]Tachypnea  [ ]Audible excessive secretions   [ ]Rhonchi        [ ]Right [ ]Left [ ]Bilateral  [ ]Crackles        [ ]Right [ ]Left [ ]Bilateral  [ ]Wheezing     [ ]Right [ ]Left [ ]Bilateral  [x ]Diminished BS [ ] Right [ ]Left [ ]Bilateral  CARDIOVASCULAR:    [x ]Regular [ ]Irregular [ ]Tachy  [ ]Isael [ ]Murmur [ ]Other  GASTROINTESTINAL:  [x ]Soft  [ ]Distended   [ ]+BS  [ ]Non tender [ ]Tender  [ ]Other [ ]PEG [ ]OGT/ NGT   Last BM:   GENITOURINARY:  [ ]Normal [ x]Incontinent   [ ]Oliguria/Anuria   [ ]Sams  MUSCULOSKELETAL:   [ ]Normal   [x ]Weakness  [ ]Bed/Wheelchair bound [ ]Edema  NEUROLOGIC: unarousable  [ ]No focal deficits  [ ] Cognitive impairment  [ ] Dysphagia [ ]Dysarthria [ ] Paresis [x ]Other   SKIN:   [ ]Normal  [ ]Rash  [ ]Other  [ ]Pressure ulcer(s) [ ]y [ ]n present on admission    CRITICAL CARE:  [ ]Shock Present  [ ]Septic [ ]Cardiogenic [ ]Neurologic [ ]Hypovolemic  [ ]Vasopressors [ ]Inotropes  [ ]Respiratory failure present [ ]Mechanical Ventilation [ ]Non-invasive ventilatory support [ ]High-Flow   [ ]Acute  [ ]Chronic [ ]Hypoxic  [ ]Hypercarbic [ ]Other  [ ]Other organ failure     LABS: no new labs      RADIOLOGY & ADDITIONAL STUDIES: no new imaging    Protein Calorie Malnutrition Present: [ ]mild [ ]moderate [ ]severe [ ]underweight [ ]morbid obesity  https://www.andeal.org/vault/2440/web/files/ONC/Table_Clinical%20Characteristics%20to%20Document%20Malnutrition-White%20JV%20et%20al%653536.pdf    Height (cm): 149.9 (05-27-24 @ 09:10)  Weight (kg): 49.9 (05-27-24 @ 09:10)  BMI (kg/m2): 22.2 (05-27-24 @ 09:10)    [ x]PPSV2 < or = 30%  [ ]significant weight loss [x ]poor nutritional intake [ ]anasarca[ ]Artificial Nutrition    Other REFERRALS:  [ ]Hospice  [ ]Child Life  [ x]Social Work  [ ]Case management [ ]Holistic Therapy     Goals of Care Document:

## 2024-06-06 NOTE — SOCIAL WORK PROGRESS NOTE - NSSWPROGRESSNOTE_GEN_ALL_CORE
Pt was seen by palliative care on 6/5. Pt is currently on Comfort care and on a morphine drip at this time. As per MD pt isnt medically stable for transition to inpatient hospice setting. Support provided to family.

## 2024-06-06 NOTE — PROGRESS NOTE ADULT - PROBLEM SELECTOR PLAN 4
DNR/DNI/CMO  although inpatient hospice appropriate, patient with cheyne macias breathing pattern.  unlikely safe to transfer from hemodynamic standpoint  hospice inn aware- unit currently full at present time    c/w symptom management meds as ordered  morphing gtt 1mg/hr  Ativan q8h ATC  IV Morphine 2mg q2h prn for dyspnea- goal RR <24  IV Morphine 2mg q2h prn for severe pain  IV Morphine 1mg q2h prn for moderate pain  IV ativan 0.5mg q2h prn for anxiety, agitation, refractory dyspnea  IV glycopyrrolate 0.2mg q6h prn for secretions  dulcolax NC PRN constipation, daily

## 2024-06-06 NOTE — PROGRESS NOTE ADULT - ATTENDING COMMENTS
89 y/o F with PMHx ILD (not on home O2), pulmonary fibrosis, lung granulomatosis disease (s/p granuloma resection 1995), intestinal obstruction, mild dysplastic syndrome, pernicious anemia, dementia, OA, osteoporosis presenting to the ED s/p unwitnessed fall at home and productive cough. Admitted for PNA, UTI. Course c/b acute hypoxic respiratory failure and now CMO.

## 2024-06-06 NOTE — PROGRESS NOTE ADULT - SUBJECTIVE AND OBJECTIVE BOX
Date/Time Patient Seen:  		  Referring MD:   Data Reviewed	       Patient is a 88y old  Female who presents with a chief complaint of PNA (05 Jun 2024 12:52)      Subjective/HPI     PAST MEDICAL & SURGICAL HISTORY:  Fibroids  1978    Lymphadenopathy, inguinal  right 1988, removed surgically    Granulomatous lung disease  1995 at [facility] had granuloma removed    Pulmonary fibrosis  8/13    Osteoarthritis    Osteoporosis    Pernicious anemia  2000    Lichen of skin  vaginal area, 2012    Intestinal obstruction  1999    MDS (myelodysplastic syndrome)  2013, found incidentally    BOOP (bronchiolitis obliterans with organizing pneumonia)  1991    ILD (interstitial lung disease)    Mild dementia    Sprain of right rotator cuff capsule, initial encounter    S/P tubal ligation  1976    S/P hysterectomy  Left ovary left in place, 1978    S/P hip replacement  right 2004    S/P hip replacement  left 2013    Elective surgery  (VAT and biopsy, 2014)          Medication list         MEDICATIONS  (STANDING):  furosemide   Injectable 10 milliGRAM(s) IV Push daily  lidocaine   4% Patch 1 Patch Transdermal daily  LORazepam   Injectable 0.5 milliGRAM(s) IV Push every 8 hours  methylPREDNISolone sodium succinate Injectable 60 milliGRAM(s) IV Push daily  morphine  Infusion. 1 mG/Hr (1 mL/Hr) IV Continuous <Continuous>    MEDICATIONS  (PRN):  aluminum hydroxide/magnesium hydroxide/simethicone Suspension 30 milliLiter(s) Oral every 4 hours PRN Dyspepsia  bisacodyl Suppository 10 milliGRAM(s) Rectal daily PRN Constipation  glycopyrrolate Injectable 0.2 milliGRAM(s) IV Push every 6 hours PRN secretions  LORazepam   Injectable 0.5 milliGRAM(s) IV Push every 2 hours PRN anxiety, agitation, refractory dyspnea  morphine  - Injectable 2 milliGRAM(s) IV Push every 2 hours PRN dyspnea  morphine  - Injectable 1 milliGRAM(s) IV Push every 2 hours PRN Moderate Pain (4 - 6)  morphine  - Injectable 2 milliGRAM(s) IV Push every 2 hours PRN Severe Pain (7 - 10)  ondansetron Injectable 4 milliGRAM(s) IV Push every 8 hours PRN Nausea and/or Vomiting         Vitals log        ICU Vital Signs Last 24 Hrs  T(C): --  T(F): --  HR: --  BP: --  BP(mean): --  ABP: --  ABP(mean): --  RR: --  SpO2: --             Input and Output:  I&O's Detail    04 Jun 2024 07:01  -  05 Jun 2024 07:00  --------------------------------------------------------  IN:    IV PiggyBack: 50 mL    Oral Fluid: 440 mL  Total IN: 490 mL    OUT:  Total OUT: 0 mL    Total NET: 490 mL          Lab Data          ABG - ( 04 Jun 2024 13:17 )  pH, Arterial: 7.42  pH, Blood: x     /  pCO2: 44    /  pO2: 83    / HCO3: 28    / Base Excess: 4.0   /  SaO2: 97.4                    Review of Systems	      Objective     Physical Examination  heart s1s2  lung dc bS  head nc        Pertinent Lab findings & Imaging      [place]:  NO   Adequate UO     I&O's Detail    04 Jun 2024 07:01  -  05 Jun 2024 07:00  --------------------------------------------------------  IN:    IV PiggyBack: 50 mL    Oral Fluid: 440 mL  Total IN: 490 mL    OUT:  Total OUT: 0 mL    Total NET: 490 mL               Discussed with:     Cultures:	        Radiology

## 2024-06-06 NOTE — PROGRESS NOTE ADULT - ASSESSMENT
87 y/o F with PMHx ILD (not on home O2), pulmonary fibrosis, lung granulomatosis disease (s/p granuloma resection 1995), intestinal obstruction, mild dysplastic syndrome, pernicious anemia, dementia, OA, osteoporosis presenting to the ED s/p unwitnessed fall at home and productive cough. Admitted for PNA, UTI. Course c/b acute hypoxic respiratory failure and now CMO. 87 y/o F with PMHx ILD (not on home O2), pulmonary fibrosis, lung granulomatosis disease (s/p granuloma resection 1995), intestinal obstruction, mild dysplastic syndrome, pernicious anemia, dementia, OA, osteoporosis presenting to the ED s/p unwitnessed fall at home and productive cough. Admitted for PNA, UTI. Course c/b acute hypoxic respiratory failure and now CMO. At end of life.

## 2024-06-06 NOTE — PROGRESS NOTE ADULT - PROBLEM SELECTOR PLAN 5
will continue to follow  Renata Bloom MD, [facility]; Palliative Care Attending, Ethicist. [phone]

## 2024-06-06 NOTE — PROGRESS NOTE ADULT - PROBLEM/PLAN-4
Addended by: TARA JUAREZ on: 2/15/2018 11:21 AM     Modules accepted: Ayan    
DISPLAY PLAN FREE TEXT

## 2024-06-06 NOTE — PROGRESS NOTE ADULT - ASSESSMENT
89 y/o F with PMHx ILD (not on home O2), pulmonary fibrosis, lung granulomatosis disease (s/p granuloma resection 1995), intestinal obstruction, mild dysplastic syndrome, pernicious anemia, dementia, OA, osteoporosis presenting to the ED s/p unwitnessed fall at home and productive cough. Patient has dementia, majority of history obtained from HHA and daughter at bedside    resp distress  OP  OA  ILD  pulm fibrosis    pall measures  cmo  dnr dni  on steroids  on opioids  fio2 support  pall f/u noted  oral hygiene  skin care  prognosis poor  end stage Lung Disease  spoke with Family

## 2024-06-06 NOTE — PROGRESS NOTE ADULT - PROBLEM SELECTOR PLAN 2
Sepsis POA sec to PNA +/- acute UTI  - urine cx with >3 organisms  - CMO and planned for hospice inn  - stop abx - d/w ID

## 2024-06-06 NOTE — PROGRESS NOTE ADULT - SUBJECTIVE AND OBJECTIVE BOX
Patient is a 88y old  Female who presents with a chief complaint of PNA (06 Jun 2024 10:40)    INTERVAL HPI/OVERNIGHT EVENTS: Pt on comfort care. This morning pt remains on NRB, appears comfortable and is not tachypneic. Pt on morphine gtt.    MEDICATIONS  (STANDING):  furosemide   Injectable 10 milliGRAM(s) IV Push daily  lidocaine   4% Patch 1 Patch Transdermal daily  LORazepam   Injectable 0.5 milliGRAM(s) IV Push every 8 hours  methylPREDNISolone sodium succinate Injectable 60 milliGRAM(s) IV Push daily  morphine  Infusion. 1 mG/Hr (1 mL/Hr) IV Continuous <Continuous>    MEDICATIONS  (PRN):  aluminum hydroxide/magnesium hydroxide/simethicone Suspension 30 milliLiter(s) Oral every 4 hours PRN Dyspepsia  bisacodyl Suppository 10 milliGRAM(s) Rectal daily PRN Constipation  glycopyrrolate Injectable 0.2 milliGRAM(s) IV Push every 6 hours PRN secretions  LORazepam   Injectable 0.5 milliGRAM(s) IV Push every 2 hours PRN anxiety, agitation, refractory dyspnea  morphine  - Injectable 2 milliGRAM(s) IV Push every 2 hours PRN dyspnea  morphine  - Injectable 1 milliGRAM(s) IV Push every 2 hours PRN Moderate Pain (4 - 6)  morphine  - Injectable 2 milliGRAM(s) IV Push every 2 hours PRN Severe Pain (7 - 10)  ondansetron Injectable 4 milliGRAM(s) IV Push every 8 hours PRN Nausea and/or Vomiting      Allergies    sulfa drugs (Short breath)  adhesives (Hives)  PC Pen VK (Other (Mild to Mod))    Intolerances        REVIEW OF SYSTEMS:  Unable to assess    Vital Signs Last 24 Hrs  T(C): --  T(F): --  HR: --  BP: --  BP(mean): --  RR: --  SpO2: --        PHYSICAL EXAM:  GENERAL: NAD, resting comfortably  HEENT:  anicteric  CHEST/LUNG:  CTA b/l, no rales, wheezes, or rhonchi  HEART:  RRR, S1, S2  ABDOMEN:  soft, nondistended  EXTREMITIES: no edema, cyanosis, or calf tenderness  NERVOUS SYSTEM: unable to assess    LABS:                CAPILLARY BLOOD GLUCOSE            Culture - Blood (collected 06-04-24 @ 13:10)  Source: .Blood Blood  Preliminary Report (06-05-24 @ 19:02):    No growth at 24 hours    Culture - Blood (collected 06-04-24 @ 12:35)  Source: .Blood Blood  Preliminary Report (06-05-24 @ 19:02):    No growth at 24 hours        RADIOLOGY & ADDITIONAL TESTS:  Personally reviewed.     Consultant(s) Notes Reviewed:  [x] YES  [ ] NO Patient is a 88y old  Female who presents with a chief complaint of PNA (06 Jun 2024 10:40)    INTERVAL HPI/OVERNIGHT EVENTS: Pt on comfort care. This morning pt remains on NRB, appears comfortable and is not tachypneic. Pt on morphine gtt. Appears to be at EOL. Stepdaughter at bedside. Emotional support provided.    MEDICATIONS  (STANDING):  furosemide   Injectable 10 milliGRAM(s) IV Push daily  lidocaine   4% Patch 1 Patch Transdermal daily  LORazepam   Injectable 0.5 milliGRAM(s) IV Push every 8 hours  methylPREDNISolone sodium succinate Injectable 60 milliGRAM(s) IV Push daily  morphine  Infusion. 1 mG/Hr (1 mL/Hr) IV Continuous <Continuous>    MEDICATIONS  (PRN):  aluminum hydroxide/magnesium hydroxide/simethicone Suspension 30 milliLiter(s) Oral every 4 hours PRN Dyspepsia  bisacodyl Suppository 10 milliGRAM(s) Rectal daily PRN Constipation  glycopyrrolate Injectable 0.2 milliGRAM(s) IV Push every 6 hours PRN secretions  LORazepam   Injectable 0.5 milliGRAM(s) IV Push every 2 hours PRN anxiety, agitation, refractory dyspnea  morphine  - Injectable 2 milliGRAM(s) IV Push every 2 hours PRN dyspnea  morphine  - Injectable 1 milliGRAM(s) IV Push every 2 hours PRN Moderate Pain (4 - 6)  morphine  - Injectable 2 milliGRAM(s) IV Push every 2 hours PRN Severe Pain (7 - 10)  ondansetron Injectable 4 milliGRAM(s) IV Push every 8 hours PRN Nausea and/or Vomiting      Allergies    sulfa drugs (Short breath)  adhesives (Hives)  PC Pen VK (Other (Mild to Mod))    Intolerances        REVIEW OF SYSTEMS:  Unable to assess    Vital Signs Last 24 Hrs  T(C): --  T(F): --  HR: --  BP: --  BP(mean): --  RR: --  SpO2: --        PHYSICAL EXAM:  GENERAL: NAD, resting comfortably  HEENT:  anicteric  CHEST/LUNG: decreased breath sounds  HEART:  RRR, S1, S2  ABDOMEN:  soft, nondistended  EXTREMITIES: no edema, cyanosis, or calf tenderness  NERVOUS SYSTEM: unable to assess    LABS:                CAPILLARY BLOOD GLUCOSE            Culture - Blood (collected 06-04-24 @ 13:10)  Source: .Blood Blood  Preliminary Report (06-05-24 @ 19:02):    No growth at 24 hours    Culture - Blood (collected 06-04-24 @ 12:35)  Source: .Blood Blood  Preliminary Report (06-05-24 @ 19:02):    No growth at 24 hours        RADIOLOGY & ADDITIONAL TESTS:  Personally reviewed.     Consultant(s) Notes Reviewed:  [x] YES  [ ] NO

## 2024-06-07 NOTE — PROGRESS NOTE ADULT - SUBJECTIVE AND OBJECTIVE BOX
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    Pulmonary fibrosis  8/13    Osteoarthritis    Osteoporosis    Pernicious anemia  2000    Lichen of skin  vaginal area, 2012    Intestinal obstruction  1999    MDS (myelodysplastic syndrome)  2013, found incidentally    BOOP (bronchiolitis obliterans with organizing pneumonia)  1991    ILD (interstitial lung disease)    Mild dementia    Sprain of right rotator cuff capsule, initial encounter    S/P tubal ligation  1976    S/P hysterectomy  Left ovary left in place, 1978    S/P hip replacement  right 2004    S/P hip replacement  left 2013    Elective surgery  (VAT and biopsy, 2014)          Medication list         MEDICATIONS  (STANDING):  furosemide   Injectable 10 milliGRAM(s) IV Push daily  lidocaine   4% Patch 1 Patch Transdermal daily  LORazepam   Injectable 0.5 milliGRAM(s) IV Push every 8 hours  methylPREDNISolone sodium succinate Injectable 60 milliGRAM(s) IV Push daily  morphine  Infusion. 1 mG/Hr (1 mL/Hr) IV Continuous <Continuous>    MEDICATIONS  (PRN):  aluminum hydroxide/magnesium hydroxide/simethicone Suspension 30 milliLiter(s) Oral every 4 hours PRN Dyspepsia  bisacodyl Suppository 10 milliGRAM(s) Rectal daily PRN Constipation  glycopyrrolate Injectable 0.2 milliGRAM(s) IV Push every 6 hours PRN secretions  LORazepam   Injectable 0.5 milliGRAM(s) IV Push every 2 hours PRN anxiety, agitation, refractory dyspnea  morphine  - Injectable 2 milliGRAM(s) IV Push every 2 hours PRN dyspnea  morphine  - Injectable 1 milliGRAM(s) IV Push every 2 hours PRN Moderate Pain (4 - 6)  morphine  - Injectable 2 milliGRAM(s) IV Push every 2 hours PRN Severe Pain (7 - 10)  ondansetron Injectable 4 milliGRAM(s) IV Push every 8 hours PRN Nausea and/or Vomiting         Vitals log        ICU Vital Signs Last 24 Hrs  T(C): --  T(F): --  HR: --  BP: --  BP(mean): --  ABP: --  ABP(mean): --  RR: --  SpO2: --             Input and Output:  I&O's Detail      Lab Data                  Review of Systems	      Objective     Physical Examination    heart s1s2  lung dc BS  head nc      Pertinent Lab findings & Imaging      Sams:  NO   Adequate UO     I&O's Detail           Discussed with:     Cultures:	        Radiology

## 2024-06-07 NOTE — PROGRESS NOTE ADULT - PROVIDER SPECIALTY LIST ADULT
Hospitalist
Infectious Disease
Palliative Care
Pulmonology
Infectious Disease
Palliative Care
Hospitalist
Infectious Disease
Infectious Disease
Hospitalist
Pulmonology
Pulmonology
Hospitalist

## 2024-06-07 NOTE — PROGRESS NOTE ADULT - ASSESSMENT
87 y/o F with PMHx ILD (not on home O2), pulmonary fibrosis, lung granulomatosis disease (s/p granuloma resection 1995), intestinal obstruction, mild dysplastic syndrome, pernicious anemia, dementia, OA, osteoporosis presenting to the ED s/p unwitnessed fall at home and productive cough. Patient has dementia, majority of history obtained from HHA and daughter at bedside    resp distress  OP  OA  ILD  pulm fibrosis    pall measures  cmo  dnr dni  on steroids  on opioids  fio2 support  pall f/u noted  oral hygiene  skin care  prognosis poor  end stage Lung Disease  spoke with Family

## 2024-06-07 NOTE — CHART NOTE - NSCHARTNOTEFT_GEN_A_CORE
Called by RN as patient appears to be no longer breathing. Patient seen and examined at bedside. Patient did not respond to verbal, physical, or painful stimuli. Absent heart and breath sounds on auscultation. Pupils are fixed and dilated, absent corneal reflex. No palpable pulses at radial, carotid, or femoral arteries.    Time of Death: 9:12am 6/7/2024  Dr. Castillo notified via phone.  Family notified at bedside, pts step-daughter coming to bedside.

## 2024-06-07 NOTE — DISCHARGE NOTE FOR THE EXPIRED PATIENT - HOSPITAL COURSE
Pt was admitted for sepsis 2/2 PNA. Pt was started on ceftriaxone and azithromycin, O2 by nasal canula and supportive treatment.  ID was consulted. Strept ag was positive, RVP was negative. Pt completed course of rocephin. Blood cultures were negative. Pt had desaturation and tachypnea and was placed on venti mask. CTA was negative for PE. Pt received IV lasix. Pts O2 requirements continued to increase, and she was placed on NRB. ICU was consulted and GOC discussion occurred and pt was placed on comfort measures only. Pt was started on morphine gtt for inc WOB and tachypnea. Pt was admitted for sepsis 2/2 PNA. Pt was started on ceftriaxone and azithromycin, O2 by nasal canula and supportive treatment.  ID was consulted. Strept ag was positive, RVP was negative. Pt completed course of rocephin. Blood cultures were negative. Pt had desaturation and tachypnea and was placed on venti mask. CTA was negative for PE. Pt received IV lasix. Pts O2 requirements continued to increase, and she was placed on NRB. ICU was consulted and GOC discussion occurred and pt was placed on comfort measures only. Pt was started on morphine gtt for inc WOB and tachypnea.    Vital Signs Last 24 Hrs  T(C): --  T(F): --  HR: --  BP: --  BP(mean): --  RR: --  SpO2: --    Patient did not respond to verbal, physical, or painful stimuli. Absent heart and breath sounds on auscultation. Pupils are fixed and dilated, absent corneal reflex. No palpable pulses at radial, carotid, or femoral arteries.        Time Spend: 30 mins

## 2024-06-09 LAB
CULTURE RESULTS: SIGNIFICANT CHANGE UP
CULTURE RESULTS: SIGNIFICANT CHANGE UP
SPECIMEN SOURCE: SIGNIFICANT CHANGE UP
SPECIMEN SOURCE: SIGNIFICANT CHANGE UP

## 2024-06-10 ENCOUNTER — APPOINTMENT (OUTPATIENT)
Dept: INTERNAL MEDICINE | Facility: CLINIC | Age: 89
End: 2024-06-10

## 2024-06-10 NOTE — PHYSICAL THERAPY INITIAL EVALUATION ADULT - BED MOBILITY LIMITATIONS, REHAB EVAL
The patient is a 13y Female complaining of 
RUE immobilized in sling. LUE able to be used for pushing/pullling during bed mobility tasks./decreased ability to use arms for pushing/pulling

## 2024-06-11 ENCOUNTER — APPOINTMENT (OUTPATIENT)
Dept: NEUROLOGY | Facility: CLINIC | Age: 89
End: 2024-06-11

## 2024-06-13 ENCOUNTER — TRANSCRIPTION ENCOUNTER (OUTPATIENT)
Age: 89
End: 2024-06-13

## 2024-08-06 ENCOUNTER — APPOINTMENT (OUTPATIENT)
Dept: INTERNAL MEDICINE | Facility: CLINIC | Age: 89
End: 2024-08-06